# Patient Record
Sex: MALE | Race: WHITE | NOT HISPANIC OR LATINO
[De-identification: names, ages, dates, MRNs, and addresses within clinical notes are randomized per-mention and may not be internally consistent; named-entity substitution may affect disease eponyms.]

---

## 2022-11-04 ENCOUNTER — APPOINTMENT (OUTPATIENT)
Dept: CARDIOLOGY | Facility: CLINIC | Age: 87
End: 2022-11-04

## 2022-11-04 ENCOUNTER — APPOINTMENT (OUTPATIENT)
Dept: ELECTROPHYSIOLOGY | Facility: CLINIC | Age: 87
End: 2022-11-04

## 2022-11-04 ENCOUNTER — NON-APPOINTMENT (OUTPATIENT)
Age: 87
End: 2022-11-04

## 2022-11-04 VITALS
HEART RATE: 75 BPM | BODY MASS INDEX: 26.37 KG/M2 | DIASTOLIC BLOOD PRESSURE: 73 MMHG | HEIGHT: 67 IN | OXYGEN SATURATION: 99 % | WEIGHT: 168 LBS | SYSTOLIC BLOOD PRESSURE: 120 MMHG

## 2022-11-04 DIAGNOSIS — Z86.79 PERSONAL HISTORY OF OTHER DISEASES OF THE CIRCULATORY SYSTEM: ICD-10-CM

## 2022-11-04 DIAGNOSIS — Z87.891 PERSONAL HISTORY OF NICOTINE DEPENDENCE: ICD-10-CM

## 2022-11-04 DIAGNOSIS — I63.9 CEREBRAL INFARCTION, UNSPECIFIED: ICD-10-CM

## 2022-11-04 PROBLEM — Z00.00 ENCOUNTER FOR PREVENTIVE HEALTH EXAMINATION: Status: ACTIVE | Noted: 2022-11-04

## 2022-11-04 PROCEDURE — 99204 OFFICE O/P NEW MOD 45 MIN: CPT

## 2022-11-04 PROCEDURE — 93248 EXT ECG>7D<15D REV&INTERPJ: CPT

## 2022-11-09 ENCOUNTER — OUTPATIENT (OUTPATIENT)
Dept: OUTPATIENT SERVICES | Facility: HOSPITAL | Age: 87
LOS: 1 days | End: 2022-11-09
Payer: MEDICARE

## 2022-11-09 ENCOUNTER — TRANSCRIPTION ENCOUNTER (OUTPATIENT)
Age: 87
End: 2022-11-09

## 2022-11-09 ENCOUNTER — APPOINTMENT (OUTPATIENT)
Dept: CV DIAGNOSITCS | Facility: HOSPITAL | Age: 87
End: 2022-11-09

## 2022-11-09 DIAGNOSIS — I63.9 CEREBRAL INFARCTION, UNSPECIFIED: ICD-10-CM

## 2022-11-09 PROCEDURE — 93306 TTE W/DOPPLER COMPLETE: CPT | Mod: 26

## 2022-11-09 PROCEDURE — C8929: CPT

## 2022-11-10 NOTE — ASSESSMENT
[FreeTextEntry1] : Assessment:\par 1. Recent CVA\par 2. HTN\par 3. HLD\par 4. Hematuria - resolved\par 5. H/o Bladder CA and Cholangiocarcinoma\par 6. S/p bioprosthetic AVR\par \par Plan:\par 1. MRA neck showed no internal carotid artery disease.\par 2. EKG in NSR. No reported history of Afib.\par 3. Zio patch recommended for 1 week.\par 4. Recommend to follow-up with urology to see if urine cytology indicated. Urology follow-up.\par 5. Continue Plavix at this time. Continue Lipitor.\par 6. Echocardiogram was not done with bubble. Recommend Echo with bubble study.\par 7. LE venous duplex to assure no DVT.\par 8. BP controlled on BB.\par 9. Follow-up with Neurology.\par 10. Will call with test results. Call with any questions. \par Discussed with Moreno, his son in-law and grand-daughter at bedside.

## 2022-11-10 NOTE — PHYSICAL EXAM
[Respiration, Rhythm And Depth] : normal respiratory rhythm and effort [Auscultation Breath Sounds / Voice Sounds] : lungs were clear to auscultation bilaterally [Bowel Sounds] : normal bowel sounds [Abdomen Soft] : soft [Abdomen Tenderness] : non-tender [Abnormal Walk] : normal gait [Cyanosis, Localized] : no localized cyanosis [] : no ischemic changes [No Skin Ulcers] : no skin ulcer [FreeTextEntry1] : No LE edema, bilateral calves soft, bilateral calf tenderness

## 2022-11-10 NOTE — HISTORY OF PRESENT ILLNESS
[FreeTextEntry1] : 11/4/2022\par \par 89 y/o M Ex-Smoker w/ PMHX Cholangiocarcinoma (treated with CyberKnife, etc. and stable) and Bladder CA (in remission), CVA 10/20/22 (improved R upper extremity weakness), CAD (no cardiac stents), History of Aortic Valve Replacement in 2017, HTN, HLD, Hypothyroidism, COPD, CKD stage 3, was treated for CVA at [facility] with ASA, Plavix and Heparin gtt and was noted to have gross hematuria. tPA not given. In the hospital he was taken off Heparin gtt and ASA. He remains on Plavix and no further hematuria. \par \par Llabs: LDL 84 10/20, Cr. 1.5 on 10/20\par TTE 10/20 showed LVEF 54%, normal wall motion, aortic root 4.0 cm, bioprosthetic aortic valve. LA normal in size.\par CT head showed left thalamic and periventricular acute infarct secondary to small vessel ischemia.\par MTA neck showed no internal carotid artery disease.\par US kidney and bladder showed hemorrhagic products in bladder., L renal cyst.\par \par No hematuria since recent discharge. \par \par Medications:\par Synthroid 100 mcg daily\par Toprol XL 25 mg daily\par Plavix 75 mg daily\par Spiriva\par Eye Vitamin\par Super B-Complex\par Lipitor 40 mg QHS\par Finasteride\par CO!10

## 2022-11-11 ENCOUNTER — NON-APPOINTMENT (OUTPATIENT)
Age: 87
End: 2022-11-11

## 2022-11-18 ENCOUNTER — NON-APPOINTMENT (OUTPATIENT)
Age: 87
End: 2022-11-18

## 2022-11-21 ENCOUNTER — APPOINTMENT (OUTPATIENT)
Dept: ULTRASOUND IMAGING | Facility: CLINIC | Age: 87
End: 2022-11-21

## 2022-11-21 ENCOUNTER — OUTPATIENT (OUTPATIENT)
Dept: OUTPATIENT SERVICES | Facility: HOSPITAL | Age: 87
LOS: 1 days | End: 2022-11-21
Payer: MEDICARE

## 2022-11-21 DIAGNOSIS — Z00.8 ENCOUNTER FOR OTHER GENERAL EXAMINATION: ICD-10-CM

## 2022-11-21 PROCEDURE — 93970 EXTREMITY STUDY: CPT | Mod: 26

## 2022-11-21 PROCEDURE — 93970 EXTREMITY STUDY: CPT

## 2022-12-12 ENCOUNTER — NON-APPOINTMENT (OUTPATIENT)
Age: 87
End: 2022-12-12

## 2023-12-05 ENCOUNTER — APPOINTMENT (OUTPATIENT)
Dept: INFECTIOUS DISEASE | Facility: CLINIC | Age: 88
End: 2023-12-05
Payer: MEDICARE

## 2023-12-05 VITALS
HEART RATE: 80 BPM | SYSTOLIC BLOOD PRESSURE: 115 MMHG | DIASTOLIC BLOOD PRESSURE: 53 MMHG | TEMPERATURE: 97.5 F | WEIGHT: 155 LBS | BODY MASS INDEX: 24.33 KG/M2 | HEIGHT: 67 IN | OXYGEN SATURATION: 95 %

## 2023-12-05 PROCEDURE — 99204 OFFICE O/P NEW MOD 45 MIN: CPT

## 2023-12-07 ENCOUNTER — INPATIENT (INPATIENT)
Facility: HOSPITAL | Age: 88
LOS: 10 days | Discharge: HOSPICE | DRG: 559 | End: 2023-12-18
Attending: INTERNAL MEDICINE | Admitting: INTERNAL MEDICINE
Payer: MEDICARE

## 2023-12-07 VITALS
HEIGHT: 67 IN | SYSTOLIC BLOOD PRESSURE: 134 MMHG | HEART RATE: 85 BPM | DIASTOLIC BLOOD PRESSURE: 74 MMHG | TEMPERATURE: 98 F | RESPIRATION RATE: 20 BRPM | WEIGHT: 154.98 LBS

## 2023-12-07 DIAGNOSIS — Z96.641 PRESENCE OF RIGHT ARTIFICIAL HIP JOINT: Chronic | ICD-10-CM

## 2023-12-07 DIAGNOSIS — M25.551 PAIN IN RIGHT HIP: ICD-10-CM

## 2023-12-07 LAB
ALBUMIN SERPL ELPH-MCNC: 3.2 G/DL — LOW (ref 3.3–5)
ALBUMIN SERPL ELPH-MCNC: 3.2 G/DL — LOW (ref 3.3–5)
ALP SERPL-CCNC: 249 U/L — HIGH (ref 40–120)
ALP SERPL-CCNC: 249 U/L — HIGH (ref 40–120)
ALT FLD-CCNC: 52 U/L — HIGH (ref 10–45)
ALT FLD-CCNC: 52 U/L — HIGH (ref 10–45)
ANION GAP SERPL CALC-SCNC: 15 MMOL/L — SIGNIFICANT CHANGE UP (ref 5–17)
ANION GAP SERPL CALC-SCNC: 15 MMOL/L — SIGNIFICANT CHANGE UP (ref 5–17)
APTT BLD: 28.7 SEC — SIGNIFICANT CHANGE UP (ref 24.5–35.6)
APTT BLD: 28.7 SEC — SIGNIFICANT CHANGE UP (ref 24.5–35.6)
AST SERPL-CCNC: 54 U/L — HIGH (ref 10–40)
AST SERPL-CCNC: 54 U/L — HIGH (ref 10–40)
BASE EXCESS BLDV CALC-SCNC: -0.6 MMOL/L — SIGNIFICANT CHANGE UP (ref -2–3)
BASE EXCESS BLDV CALC-SCNC: -0.6 MMOL/L — SIGNIFICANT CHANGE UP (ref -2–3)
BASOPHILS # BLD AUTO: 0.03 K/UL — SIGNIFICANT CHANGE UP (ref 0–0.2)
BASOPHILS # BLD AUTO: 0.03 K/UL — SIGNIFICANT CHANGE UP (ref 0–0.2)
BASOPHILS NFR BLD AUTO: 0.3 % — SIGNIFICANT CHANGE UP (ref 0–2)
BASOPHILS NFR BLD AUTO: 0.3 % — SIGNIFICANT CHANGE UP (ref 0–2)
BILIRUB SERPL-MCNC: 0.6 MG/DL — SIGNIFICANT CHANGE UP (ref 0.2–1.2)
BILIRUB SERPL-MCNC: 0.6 MG/DL — SIGNIFICANT CHANGE UP (ref 0.2–1.2)
BUN SERPL-MCNC: 44 MG/DL — HIGH (ref 7–23)
BUN SERPL-MCNC: 44 MG/DL — HIGH (ref 7–23)
CA-I SERPL-SCNC: 1.26 MMOL/L — SIGNIFICANT CHANGE UP (ref 1.15–1.33)
CA-I SERPL-SCNC: 1.26 MMOL/L — SIGNIFICANT CHANGE UP (ref 1.15–1.33)
CALCIUM SERPL-MCNC: 9.3 MG/DL — SIGNIFICANT CHANGE UP (ref 8.4–10.5)
CALCIUM SERPL-MCNC: 9.3 MG/DL — SIGNIFICANT CHANGE UP (ref 8.4–10.5)
CHLORIDE BLDV-SCNC: 109 MMOL/L — HIGH (ref 96–108)
CHLORIDE BLDV-SCNC: 109 MMOL/L — HIGH (ref 96–108)
CHLORIDE SERPL-SCNC: 105 MMOL/L — SIGNIFICANT CHANGE UP (ref 96–108)
CHLORIDE SERPL-SCNC: 105 MMOL/L — SIGNIFICANT CHANGE UP (ref 96–108)
CO2 BLDV-SCNC: 26 MMOL/L — SIGNIFICANT CHANGE UP (ref 22–26)
CO2 BLDV-SCNC: 26 MMOL/L — SIGNIFICANT CHANGE UP (ref 22–26)
CO2 SERPL-SCNC: 20 MMOL/L — LOW (ref 22–31)
CO2 SERPL-SCNC: 20 MMOL/L — LOW (ref 22–31)
CREAT SERPL-MCNC: 1.14 MG/DL — SIGNIFICANT CHANGE UP (ref 0.5–1.3)
CREAT SERPL-MCNC: 1.14 MG/DL — SIGNIFICANT CHANGE UP (ref 0.5–1.3)
CRP SERPL-MCNC: 58 MG/L — HIGH (ref 0–4)
CRP SERPL-MCNC: 58 MG/L — HIGH (ref 0–4)
EGFR: 59 ML/MIN/1.73M2 — LOW
EGFR: 59 ML/MIN/1.73M2 — LOW
EOSINOPHIL # BLD AUTO: 0.08 K/UL — SIGNIFICANT CHANGE UP (ref 0–0.5)
EOSINOPHIL # BLD AUTO: 0.08 K/UL — SIGNIFICANT CHANGE UP (ref 0–0.5)
EOSINOPHIL NFR BLD AUTO: 0.8 % — SIGNIFICANT CHANGE UP (ref 0–6)
EOSINOPHIL NFR BLD AUTO: 0.8 % — SIGNIFICANT CHANGE UP (ref 0–6)
ERYTHROCYTE [SEDIMENTATION RATE] IN BLOOD: 94 MM/HR — HIGH (ref 0–20)
ERYTHROCYTE [SEDIMENTATION RATE] IN BLOOD: 94 MM/HR — HIGH (ref 0–20)
GAS PNL BLDV: 137 MMOL/L — SIGNIFICANT CHANGE UP (ref 136–145)
GAS PNL BLDV: 137 MMOL/L — SIGNIFICANT CHANGE UP (ref 136–145)
GAS PNL BLDV: SIGNIFICANT CHANGE UP
GAS PNL BLDV: SIGNIFICANT CHANGE UP
GLUCOSE BLDV-MCNC: 109 MG/DL — HIGH (ref 70–99)
GLUCOSE BLDV-MCNC: 109 MG/DL — HIGH (ref 70–99)
GLUCOSE SERPL-MCNC: 113 MG/DL — HIGH (ref 70–99)
GLUCOSE SERPL-MCNC: 113 MG/DL — HIGH (ref 70–99)
HCO3 BLDV-SCNC: 25 MMOL/L — SIGNIFICANT CHANGE UP (ref 22–29)
HCO3 BLDV-SCNC: 25 MMOL/L — SIGNIFICANT CHANGE UP (ref 22–29)
HCT VFR BLD CALC: 25.6 % — LOW (ref 39–50)
HCT VFR BLD CALC: 25.6 % — LOW (ref 39–50)
HCT VFR BLDA CALC: 26 % — LOW (ref 39–51)
HCT VFR BLDA CALC: 26 % — LOW (ref 39–51)
HGB BLD CALC-MCNC: 8.5 G/DL — LOW (ref 12.6–17.4)
HGB BLD CALC-MCNC: 8.5 G/DL — LOW (ref 12.6–17.4)
HGB BLD-MCNC: 8 G/DL — LOW (ref 13–17)
HGB BLD-MCNC: 8 G/DL — LOW (ref 13–17)
IMM GRANULOCYTES NFR BLD AUTO: 0.6 % — SIGNIFICANT CHANGE UP (ref 0–0.9)
IMM GRANULOCYTES NFR BLD AUTO: 0.6 % — SIGNIFICANT CHANGE UP (ref 0–0.9)
INR BLD: 1.91 RATIO — HIGH (ref 0.85–1.18)
INR BLD: 1.91 RATIO — HIGH (ref 0.85–1.18)
LACTATE BLDV-MCNC: 1.7 MMOL/L — SIGNIFICANT CHANGE UP (ref 0.5–2)
LACTATE BLDV-MCNC: 1.7 MMOL/L — SIGNIFICANT CHANGE UP (ref 0.5–2)
LYMPHOCYTES # BLD AUTO: 0.7 K/UL — LOW (ref 1–3.3)
LYMPHOCYTES # BLD AUTO: 0.7 K/UL — LOW (ref 1–3.3)
LYMPHOCYTES # BLD AUTO: 7 % — LOW (ref 13–44)
LYMPHOCYTES # BLD AUTO: 7 % — LOW (ref 13–44)
MAGNESIUM SERPL-MCNC: 2.4 MG/DL — SIGNIFICANT CHANGE UP (ref 1.6–2.6)
MAGNESIUM SERPL-MCNC: 2.4 MG/DL — SIGNIFICANT CHANGE UP (ref 1.6–2.6)
MCHC RBC-ENTMCNC: 31.3 GM/DL — LOW (ref 32–36)
MCHC RBC-ENTMCNC: 31.3 GM/DL — LOW (ref 32–36)
MCHC RBC-ENTMCNC: 31.4 PG — SIGNIFICANT CHANGE UP (ref 27–34)
MCHC RBC-ENTMCNC: 31.4 PG — SIGNIFICANT CHANGE UP (ref 27–34)
MCV RBC AUTO: 100.4 FL — HIGH (ref 80–100)
MCV RBC AUTO: 100.4 FL — HIGH (ref 80–100)
MONOCYTES # BLD AUTO: 0.96 K/UL — HIGH (ref 0–0.9)
MONOCYTES # BLD AUTO: 0.96 K/UL — HIGH (ref 0–0.9)
MONOCYTES NFR BLD AUTO: 9.6 % — SIGNIFICANT CHANGE UP (ref 2–14)
MONOCYTES NFR BLD AUTO: 9.6 % — SIGNIFICANT CHANGE UP (ref 2–14)
NEUTROPHILS # BLD AUTO: 8.21 K/UL — HIGH (ref 1.8–7.4)
NEUTROPHILS # BLD AUTO: 8.21 K/UL — HIGH (ref 1.8–7.4)
NEUTROPHILS NFR BLD AUTO: 81.7 % — HIGH (ref 43–77)
NEUTROPHILS NFR BLD AUTO: 81.7 % — HIGH (ref 43–77)
NRBC # BLD: 0 /100 WBCS — SIGNIFICANT CHANGE UP (ref 0–0)
NRBC # BLD: 0 /100 WBCS — SIGNIFICANT CHANGE UP (ref 0–0)
PCO2 BLDV: 44 MMHG — SIGNIFICANT CHANGE UP (ref 42–55)
PCO2 BLDV: 44 MMHG — SIGNIFICANT CHANGE UP (ref 42–55)
PH BLDV: 7.36 — SIGNIFICANT CHANGE UP (ref 7.32–7.43)
PH BLDV: 7.36 — SIGNIFICANT CHANGE UP (ref 7.32–7.43)
PHOSPHATE SERPL-MCNC: 4.2 MG/DL — SIGNIFICANT CHANGE UP (ref 2.5–4.5)
PHOSPHATE SERPL-MCNC: 4.2 MG/DL — SIGNIFICANT CHANGE UP (ref 2.5–4.5)
PLATELET # BLD AUTO: 203 K/UL — SIGNIFICANT CHANGE UP (ref 150–400)
PLATELET # BLD AUTO: 203 K/UL — SIGNIFICANT CHANGE UP (ref 150–400)
PO2 BLDV: 32 MMHG — SIGNIFICANT CHANGE UP (ref 25–45)
PO2 BLDV: 32 MMHG — SIGNIFICANT CHANGE UP (ref 25–45)
POTASSIUM BLDV-SCNC: 4.2 MMOL/L — SIGNIFICANT CHANGE UP (ref 3.5–5.1)
POTASSIUM BLDV-SCNC: 4.2 MMOL/L — SIGNIFICANT CHANGE UP (ref 3.5–5.1)
POTASSIUM SERPL-MCNC: 4.7 MMOL/L — SIGNIFICANT CHANGE UP (ref 3.5–5.3)
POTASSIUM SERPL-MCNC: 4.7 MMOL/L — SIGNIFICANT CHANGE UP (ref 3.5–5.3)
POTASSIUM SERPL-SCNC: 4.7 MMOL/L — SIGNIFICANT CHANGE UP (ref 3.5–5.3)
POTASSIUM SERPL-SCNC: 4.7 MMOL/L — SIGNIFICANT CHANGE UP (ref 3.5–5.3)
PROCALCITONIN SERPL-MCNC: 0.15 NG/ML — HIGH (ref 0.02–0.1)
PROCALCITONIN SERPL-MCNC: 0.15 NG/ML — HIGH (ref 0.02–0.1)
PROT SERPL-MCNC: 7.3 G/DL — SIGNIFICANT CHANGE UP (ref 6–8.3)
PROT SERPL-MCNC: 7.3 G/DL — SIGNIFICANT CHANGE UP (ref 6–8.3)
PROTHROM AB SERPL-ACNC: 20.6 SEC — HIGH (ref 9.5–13)
PROTHROM AB SERPL-ACNC: 20.6 SEC — HIGH (ref 9.5–13)
RBC # BLD: 2.55 M/UL — LOW (ref 4.2–5.8)
RBC # BLD: 2.55 M/UL — LOW (ref 4.2–5.8)
RBC # FLD: 16 % — HIGH (ref 10.3–14.5)
RBC # FLD: 16 % — HIGH (ref 10.3–14.5)
SAO2 % BLDV: 33.5 % — LOW (ref 67–88)
SAO2 % BLDV: 33.5 % — LOW (ref 67–88)
SODIUM SERPL-SCNC: 140 MMOL/L — SIGNIFICANT CHANGE UP (ref 135–145)
SODIUM SERPL-SCNC: 140 MMOL/L — SIGNIFICANT CHANGE UP (ref 135–145)
WBC # BLD: 10.04 K/UL — SIGNIFICANT CHANGE UP (ref 3.8–10.5)
WBC # BLD: 10.04 K/UL — SIGNIFICANT CHANGE UP (ref 3.8–10.5)
WBC # FLD AUTO: 10.04 K/UL — SIGNIFICANT CHANGE UP (ref 3.8–10.5)
WBC # FLD AUTO: 10.04 K/UL — SIGNIFICANT CHANGE UP (ref 3.8–10.5)

## 2023-12-07 PROCEDURE — 99232 SBSQ HOSP IP/OBS MODERATE 35: CPT

## 2023-12-07 PROCEDURE — 72170 X-RAY EXAM OF PELVIS: CPT | Mod: 26,59

## 2023-12-07 PROCEDURE — 73552 X-RAY EXAM OF FEMUR 2/>: CPT | Mod: 26,RT

## 2023-12-07 PROCEDURE — 73502 X-RAY EXAM HIP UNI 2-3 VIEWS: CPT | Mod: 26,RT

## 2023-12-07 PROCEDURE — 99285 EMERGENCY DEPT VISIT HI MDM: CPT

## 2023-12-07 PROCEDURE — 99223 1ST HOSP IP/OBS HIGH 75: CPT | Mod: GC

## 2023-12-07 RX ORDER — LEVOTHYROXINE SODIUM 125 MCG
1 TABLET ORAL
Refills: 0 | DISCHARGE

## 2023-12-07 RX ORDER — TAMSULOSIN HYDROCHLORIDE 0.4 MG/1
1 CAPSULE ORAL
Refills: 0 | DISCHARGE

## 2023-12-07 RX ORDER — SENNA PLUS 8.6 MG/1
0 TABLET ORAL
Refills: 0 | DISCHARGE

## 2023-12-07 RX ORDER — ATORVASTATIN CALCIUM 80 MG/1
1 TABLET, FILM COATED ORAL
Refills: 0 | DISCHARGE

## 2023-12-07 RX ORDER — DAPTOMYCIN 500 MG/10ML
400 INJECTION, POWDER, LYOPHILIZED, FOR SOLUTION INTRAVENOUS EVERY 24 HOURS
Refills: 0 | Status: DISCONTINUED | OUTPATIENT
Start: 2023-12-07 | End: 2023-12-11

## 2023-12-07 RX ORDER — TAMSULOSIN HYDROCHLORIDE 0.4 MG/1
0.4 CAPSULE ORAL AT BEDTIME
Refills: 0 | Status: DISCONTINUED | OUTPATIENT
Start: 2023-12-07 | End: 2023-12-18

## 2023-12-07 RX ORDER — DAPTOMYCIN 500 MG/10ML
400 INJECTION, POWDER, LYOPHILIZED, FOR SOLUTION INTRAVENOUS ONCE
Refills: 0 | Status: COMPLETED | OUTPATIENT
Start: 2023-12-07 | End: 2023-12-07

## 2023-12-07 RX ORDER — LEVOTHYROXINE SODIUM 125 MCG
75 TABLET ORAL DAILY
Refills: 0 | Status: DISCONTINUED | OUTPATIENT
Start: 2023-12-07 | End: 2023-12-18

## 2023-12-07 RX ORDER — TIOTROPIUM BROMIDE 18 UG/1
2 CAPSULE ORAL; RESPIRATORY (INHALATION) DAILY
Refills: 0 | Status: DISCONTINUED | OUTPATIENT
Start: 2023-12-07 | End: 2023-12-18

## 2023-12-07 RX ORDER — ATORVASTATIN CALCIUM 80 MG/1
40 TABLET, FILM COATED ORAL AT BEDTIME
Refills: 0 | Status: DISCONTINUED | OUTPATIENT
Start: 2023-12-07 | End: 2023-12-18

## 2023-12-07 RX ORDER — FUROSEMIDE 40 MG
20 TABLET ORAL DAILY
Refills: 0 | Status: DISCONTINUED | OUTPATIENT
Start: 2023-12-07 | End: 2023-12-09

## 2023-12-07 RX ORDER — FOLIC ACID 0.8 MG
1 TABLET ORAL
Refills: 0 | DISCHARGE

## 2023-12-07 RX ORDER — METOPROLOL TARTRATE 50 MG
100 TABLET ORAL
Refills: 0 | Status: DISCONTINUED | OUTPATIENT
Start: 2023-12-07 | End: 2023-12-18

## 2023-12-07 RX ORDER — HYDROMORPHONE HYDROCHLORIDE 2 MG/ML
0.5 INJECTION INTRAMUSCULAR; INTRAVENOUS; SUBCUTANEOUS ONCE
Refills: 0 | Status: DISCONTINUED | OUTPATIENT
Start: 2023-12-07 | End: 2023-12-07

## 2023-12-07 RX ORDER — CLOPIDOGREL BISULFATE 75 MG/1
75 TABLET, FILM COATED ORAL DAILY
Refills: 0 | Status: DISCONTINUED | OUTPATIENT
Start: 2023-12-07 | End: 2023-12-11

## 2023-12-07 RX ORDER — FINASTERIDE 5 MG/1
5 TABLET, FILM COATED ORAL DAILY
Refills: 0 | Status: DISCONTINUED | OUTPATIENT
Start: 2023-12-07 | End: 2023-12-18

## 2023-12-07 RX ORDER — POTASSIUM CHLORIDE 20 MEQ
10 PACKET (EA) ORAL DAILY
Refills: 0 | Status: DISCONTINUED | OUTPATIENT
Start: 2023-12-07 | End: 2023-12-18

## 2023-12-07 RX ORDER — APIXABAN 2.5 MG/1
5 TABLET, FILM COATED ORAL
Refills: 0 | Status: DISCONTINUED | OUTPATIENT
Start: 2023-12-07 | End: 2023-12-11

## 2023-12-07 RX ORDER — SENNA PLUS 8.6 MG/1
2 TABLET ORAL AT BEDTIME
Refills: 0 | Status: DISCONTINUED | OUTPATIENT
Start: 2023-12-07 | End: 2023-12-18

## 2023-12-07 RX ADMIN — TAMSULOSIN HYDROCHLORIDE 0.4 MILLIGRAM(S): 0.4 CAPSULE ORAL at 21:11

## 2023-12-07 RX ADMIN — DAPTOMYCIN 116 MILLIGRAM(S): 500 INJECTION, POWDER, LYOPHILIZED, FOR SOLUTION INTRAVENOUS at 14:05

## 2023-12-07 RX ADMIN — HYDROMORPHONE HYDROCHLORIDE 0.5 MILLIGRAM(S): 2 INJECTION INTRAMUSCULAR; INTRAVENOUS; SUBCUTANEOUS at 12:48

## 2023-12-07 RX ADMIN — SENNA PLUS 2 TABLET(S): 8.6 TABLET ORAL at 21:12

## 2023-12-07 RX ADMIN — HYDROMORPHONE HYDROCHLORIDE 0.5 MILLIGRAM(S): 2 INJECTION INTRAMUSCULAR; INTRAVENOUS; SUBCUTANEOUS at 11:28

## 2023-12-07 RX ADMIN — ATORVASTATIN CALCIUM 40 MILLIGRAM(S): 80 TABLET, FILM COATED ORAL at 21:12

## 2023-12-07 NOTE — ED PROVIDER NOTE - CLINICAL SUMMARY MEDICAL DECISION MAKING FREE TEXT BOX
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 VSS. Exam with significant tenderness on R hip with limited ROM. Will obtain full set of labs including ESR, CRP, blood cultures. Initial imaging with XR. Will consult ID and orthopedics.

## 2023-12-07 NOTE — ED ADULT NURSE NOTE - OBJECTIVE STATEMENT
89Y M AXO3 PMH of bladder cancer, HTN, COPD, hypothyroid, and afib, aflutter and PVC and PSH of CABG presented to the ED via EMS from home c/o R hip pain. EMS states pt was "treated for infection of R hip at [facility] for 4 weeks and was d/c 2 days ago." Pt's family at bedside and states infection "originated in bladder and spread to R hip joint." EMS states pt uses intermittent oxygen at home 2L. Pt states since "d/c from [place] it has been harder to ambulate." Pt denies trauma to site.  Upon arrival to the ED, the pt is well appearing, has bilateral even and unlabored chest rise, and ambulatory with walker. Upon assessment, pt has even and bilateral peripheral pulses, ROM limited to R hip, gross neuro intact, and soft, non-tender, non-distended abdomen. Tenderness noted upon palpation of R hip. Pt denies fevers, chills, chest pain, SOB, n/v/d, headaches, dizziness, urinary symptoms, and black or bloody stools. IV access obtained, bed in lowest position, side rails up, and call bell within reach. Comfort and safety provided. 89Y M AXO3 PMH of bladder cancer, HTN, COPD, hypothyroid, and afib, aflutter and PVC and PSH of CABG presented to the ED via EMS from home c/o R hip pain. EMS states pt was "treated for infection of R hip at [facility] for 4 weeks and was d/c 2 days ago." Pt's family at bedside and states infection "originated in bladder and spread to R hip joint." EMS states pt uses intermittent oxygen at home 2L. Pt states since "d/c from [place] it has been harder to ambulate." Pt denies trauma to site.  Upon arrival to the ED, the pt is well appearing, has bilateral even and unlabored chest rise, and ambulatory with walker. Upon assessment, pt has even and bilateral peripheral pulses, ROM limited to R hip, gross neuro intact, and soft, non-tender, non-distended abdomen. Tenderness noted upon palpation of R hip. Pt denies fevers, chills, chest pain, SOB, n/v/d, headaches, dizziness, urinary symptoms, and black or bloody stools. IV access obtained, bed in lowest position, side rails up, and call bell within reach. Comfort and safety provided.

## 2023-12-07 NOTE — ED PROVIDER NOTE - OBJECTIVE STATEMENT
89M with history of HTN, HLD, COPD (on intermittent 2L NC), CAD (no stents), s/p bioprosthetic AVR, CVA in 10/2022, s/p R total hip replacement ~30 years ago, afib/aflutter, hypothyroid, CKD, bladder cancer, and cholangiocarcinoma (s/p treatment, stable) presenting from home with worsening R hip pain. Accompanied by granddaughter at bedside. Patient was found to have UTI in 9/2023 and was treated for 1 month. In 10/2023, patient was found to be bacteremic with septic joint on R hip, was treated with 6 week course of IV antibiotics without surgical debridement or operation given age and other comorbidities. Patient was discharged from [place] to [facility], was able to work with PT appropriately. He finished last dose of IV antibiotics and was discharged home on Tuesday. He was seen by Dr. Redding in clinic yesterday and was being planned for IV antibiotics but has not started yet. He started having significant pain on the R hip again since discharge, now unable to bear weight. No fever or chills. No falls or trauma. 89M with history of HTN, HLD, COPD (on intermittent 2L NC), CAD (no stents), s/p bioprosthetic AVR, CVA in 10/2022, s/p R total hip replacement ~30 years ago, afib/aflutter, hypothyroid, CKD, bladder cancer, and cholangiocarcinoma (s/p treatment, stable) presenting from home with worsening R hip pain. Accompanied by granddaughter at bedside. Patient was found to have UTI in 9/2023 and was treated for 1 month. In 10/2023, patient was found to be bacteremic with septic joint on R hip, was treated with 6 week course of IV antibiotics without surgical debridement or operation given age and other comorbidities. Patient was discharged from [employer] to [facility], was able to work with PT appropriately. He finished last dose of IV antibiotics and was discharged home on Tuesday. He was seen by Dr. Redding in clinic yesterday and was being planned for IV antibiotics but has not started yet. He started having significant pain on the R hip again since discharge, now unable to bear weight. No fever or chills. No falls or trauma. 89M with history of HTN, HLD, COPD (on intermittent 2L NC), CAD (no stents), s/p bioprosthetic AVR, CVA in 10/2022, s/p R total hip replacement ~30 years ago, afib/aflutter, hypothyroid, CKD, bladder cancer, and cholangiocarcinoma (s/p treatment, stable) presenting from home with worsening R hip pain. Accompanied by granddaughter, in the medical field, at bedside. Patient was found to have UTI in 9/2023 and was treated for 1 month. In 10/2023, patient was found to be bacteremic with septic joint on R hip, was treated with 6 week course of IV antibiotics without surgical debridement or operation given age and other comorbidities. Patient was discharged from [place] to [facility], was able to work with PT appropriately. He finished last dose of IV antibiotics and was discharged home on Tuesday. He was seen by Dr. Redding in clinic yesterday and was being planned for IV antibiotics but has not started yet. He started having significant pain on the R hip again since discharge, now unable to bear weight. No fever or chills. No falls or trauma. 89M with history of HTN, HLD, COPD (on intermittent 2L NC), CAD (no stents), s/p bioprosthetic AVR, CVA in 10/2022, s/p R total hip replacement ~30 years ago, afib/aflutter, hypothyroid, CKD, bladder cancer, and cholangiocarcinoma (s/p treatment, stable) presenting from home with worsening R hip pain. Accompanied by granddaughter, in the medical field, at bedside. Patient was found to have UTI in 9/2023 and was treated for 1 month. In 10/2023, patient was found to be bacteremic with septic joint on R hip, was treated with 6 week course of IV antibiotics without surgical debridement or operation given age and other comorbidities. Patient was discharged from [place] to [facility], was able to work with PT appropriately. He finished last dose of IV antibiotics and was discharged home on Tuesday. He was seen by Dr. Redding in clinic yesterday and was being planned for IV antibiotics but has not started yet. He started having significant pain on the R hip again since discharge, now unable to bear weight. No fever or chills. No falls or trauma.

## 2023-12-07 NOTE — ED PROVIDER NOTE - PHYSICAL EXAMINATION
GENERAL: NAD, well-groomed, on NC  HEAD: atraumatic, normocephalic  EYES: sclera clear  ENMT:   NECK: supple  LUNG: clear to auscultation bilaterally, no wheezes  HEART: +s1/s2, regular rate and rhythm, no murmurs  ABDOMEN: soft, non-tender, non-distended, no rigidity  MSK: +log roll test, limited ROM in R hip, tender with movement on R  EXTREMITIES: +2 pitting edema bilaterally  SKIN: erythematous serpiginous papule/patch on bilateral anterior shin   NEURO: alert and answering questions, moving extremities GENERAL: NAD, well-groomed, on NC  HEAD: atraumatic, normocephalic  EYES: sclera clear  NECK: supple  LUNG: clear to auscultation bilaterally, no wheezes  HEART: +s1/s2, regular rate and rhythm, no murmurs  ABDOMEN: soft, non-tender, non-distended, no rigidity  MSK: +log roll test, limited ROM in R hip, tender with movement on R  EXTREMITIES: +2 pitting edema bilaterally  SKIN: erythematous serpiginous papule/patch on bilateral anterior shin   NEURO: alert and answering questions, moving extremities

## 2023-12-07 NOTE — ED ADULT NURSE NOTE - NSFALLHARMRISKINTERV_ED_ALL_ED
Assistance OOB with selected safe patient handling equipment if applicable/Assistance with ambulation/Communicate risk of Fall with Harm to all staff, patient, and family/Monitor gait and stability/Provide patient with walking aids/Provide visual cue: red socks, yellow wristband, yellow gown, etc/Reinforce activity limits and safety measures with patient and family/Bed in lowest position, wheels locked, appropriate side rails in place/Call bell, personal items and telephone in reach/Instruct patient to call for assistance before getting out of bed/chair/stretcher/Non-slip footwear applied when patient is off stretcher/[place] to call system/Physically safe environment - no spills, clutter or unnecessary equipment/Purposeful Proactive Rounding/Room/bathroom lighting operational, light cord in reach Assistance OOB with selected safe patient handling equipment if applicable/Assistance with ambulation/Communicate risk of Fall with Harm to all staff, patient, and family/Monitor gait and stability/Provide patient with walking aids/Provide visual cue: red socks, yellow wristband, yellow gown, etc/Reinforce activity limits and safety measures with patient and family/Bed in lowest position, wheels locked, appropriate side rails in place/Call bell, personal items and telephone in reach/Instruct patient to call for assistance before getting out of bed/chair/stretcher/Non-slip footwear applied when patient is off stretcher/[place] to call system/Physically safe environment - no spills, clutter or unnecessary equipment/Purposeful Proactive Rounding/Room/bathroom lighting operational, light cord in reach

## 2023-12-07 NOTE — H&P ADULT - NEGATIVE GENERAL SYMPTOMS
Patient arrives to dept with complaints of right hand middle finger injury. He hurt it today on dog's leash/collar.    no fever/no chills/no sweating/no anorexia/no weight loss

## 2023-12-07 NOTE — ED ADULT NURSE NOTE - ED STAT RN HANDOFF DETAILS
Report given to SHERON Jamison. Report given to [facility] pt awaiting transport. Report given to SHERON Jamison. Report given to [facility] pt awaiting transport.

## 2023-12-07 NOTE — CONSULT NOTE ADULT - ASSESSMENT
89 year old with multiple medical problems including    AVR  CVA  CAD  Bladder cancer  Cholangiocarcinoma  Rp hip replacement many yrs ago    pt had a rt THR infection about 10 years ago that was treated with debridement and IV ab and improved     Recently presented to  with pain and was found to have BC and Hip aspirate culture positive S. 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 Vickers, ( ortho)  as family is now willing to consider surgery to improve his quality of life        89 year old with multiple medical problems including    AVR  CVA  CAD  Bladder cancer  Cholangiocarcinoma  Rp hip replacement many yrs ago    pt had a rt THR infection about 10 years ago that was treated with debridement and IV ab and improved     Recently presented to  with pain and was found to have BC and Hip aspirate culture positive S. 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 Vickers, ( ortho)  as family is now willing to consider surgery to improve his quality of life

## 2023-12-07 NOTE — CONSULT NOTE ADULT - SUBJECTIVE AND OBJECTIVE BOX
Patient is a 89y old  Male who presents with a chief complaint of   HPI:      PAST MEDICAL & SURGICAL HISTORY:  Atrial fibrillation      Atrial flutter      CAD (coronary artery disease)      S/P aortic valve replacement with bioprosthetic valve      History of COPD      CVA (cerebrovascular accident)      Hypothyroid      Bladder cancer      Cholangiocarcinoma      S/P hip replacement, right          Social history:    FAMILY HISTORY:    REVIEW OF SYSTEMS       Allergic/Immunologic:	No hives or rash   Allergies    penicillins (Other)    Intolerances        Antimicrobials:          Vital Signs Last 24 Hrs  T(C): 36.8 (07 Dec 2023 11:00), Max: 36.8 (07 Dec 2023 11:00)  T(F): 98.3 (07 Dec 2023 11:00), Max: 98.3 (07 Dec 2023 11:00)  HR: 84 (07 Dec 2023 11:00) (84 - 85)  BP: 113/62 (07 Dec 2023 11:00) (113/62 - 134/74)  BP(mean): 78 (07 Dec 2023 11:00) (78 - 78)  RR: 19 (07 Dec 2023 11:00) (19 - 20)  SpO2: 97% (07 Dec 2023 11:00) (97% - 97%)    Parameters below as of 07 Dec 2023 11:00  Patient On (Oxygen Delivery Method): nasal cannula  O2 Flow (L/min): 2      PHYSICAL EXAM:Pleasant patient in no acute distress.       Eyes:PERRL EOMI.NO discharge or conjunctival injection    ENMT:No sinus tenderness.No thrush.No pharyngeal exudate or erythema.Fair dental hygiene    Neck:Supple,No LN,no JVD      Respiratory:Good air entry bilaterally,CTA    Cardiovascular:S1 S2 wnl, No murmurs,rub or gallops    Gastrointestinal:Soft BS(+) no tenderness no masses ,No rebound or guarding    Genitourinary:No CVA tendereness     Rectal:    Extremities:No cyanosis,clubbing or edema.     Psychiatric:Affect normal.                                8.0    10.04 )-----------( 203      ( 07 Dec 2023 11:10 )             25.6                 RECENT CULTURES:      MICROBIOLOGY:          Radiology:      Assessment:        Recommendations and Plan:    Pager [phone]  After 5 pm/weekends or if no response :[phone]       Patient is a 89y old  Male who presents with a chief complaint of   HPI:      PAST MEDICAL & SURGICAL HISTORY:  Atrial fibrillation      Atrial flutter      CAD (coronary artery disease)      S/P aortic valve replacement with bioprosthetic valve      History of COPD      CVA (cerebrovascular accident)      Hypothyroid      Bladder cancer      Cholangiocarcinoma      S/P hip replacement, right          Social history:    FAMILY HISTORY:    REVIEW OF SYSTEMS       Allergic/Immunologic:	No hives or rash   Allergies    penicillins (Other)    Intolerances        Antimicrobials:          Vital Signs Last 24 Hrs  T(C): 36.8 (07 Dec 2023 11:00), Max: 36.8 (07 Dec 2023 11:00)  T(F): 98.3 (07 Dec 2023 11:00), Max: 98.3 (07 Dec 2023 11:00)  HR: 84 (07 Dec 2023 11:00) (84 - 85)  BP: 113/62 (07 Dec 2023 11:00) (113/62 - 134/74)  BP(mean): 78 (07 Dec 2023 11:00) (78 - 78)  RR: 19 (07 Dec 2023 11:00) (19 - 20)  SpO2: 97% (07 Dec 2023 11:00) (97% - 97%)    Parameters below as of 07 Dec 2023 11:00  Patient On (Oxygen Delivery Method): nasal cannula  O2 Flow (L/min): 2      PHYSICAL EXAM:Pleasant patient in no acute distress.       Eyes:PERRL EOMI.NO discharge or conjunctival injection    ENMT:No sinus tenderness.No thrush.No pharyngeal exudate or erythema.Fair dental hygiene    Neck:Supple,No LN,no JVD      Respiratory:Good air entry bilaterally,CTA    Cardiovascular:S1 S2 wnl, No murmurs,rub or gallops    Gastrointestinal:Soft BS(+) no tenderness no masses ,No rebound or guarding    Genitourinary:No CVA tendereness     Rectal:    Extremities:No cyanosis,clubbing or edema.     Psychiatric:Affect normal.                                8.0    10.04 )-----------( 203      ( 07 Dec 2023 11:10 )             25.6                 RECENT CULTURES:      MICROBIOLOGY:          Radiology:      Assessment:        Recommendations and Plan:    Pager [phone]  After 5 pm/weekends or if no response :[phone]

## 2023-12-07 NOTE — PATIENT PROFILE ADULT - FALL HARM RISK - HARM RISK INTERVENTIONS
Assistance with ambulation/Assistance OOB with selected safe patient handling equipment/Communicate Risk of Fall with Harm to all staff/Discuss with provider need for PT consult/Monitor gait and stability/Provide patient with walking aids - walker, cane, crutches/Reinforce activity limits and safety measures with patient and family/Tailored Fall Risk Interventions/Visual Cue: Yellow wristband and red socks/Bed in lowest position, wheels locked, appropriate side rails in place/Call bell, personal items and telephone in reach/Instruct patient to call for assistance before getting out of bed or chair/Non-slip footwear when patient is out of bed/[place] to call system/Physically safe environment - no spills, clutter or unnecessary equipment/Purposeful Proactive Rounding/Room/bathroom lighting operational, light cord in reach Assistance with ambulation/Assistance OOB with selected safe patient handling equipment/Communicate Risk of Fall with Harm to all staff/Discuss with provider need for PT consult/Monitor gait and stability/Provide patient with walking aids - walker, cane, crutches/Reinforce activity limits and safety measures with patient and family/Tailored Fall Risk Interventions/Visual Cue: Yellow wristband and red socks/Bed in lowest position, wheels locked, appropriate side rails in place/Call bell, personal items and telephone in reach/Instruct patient to call for assistance before getting out of bed or chair/Non-slip footwear when patient is out of bed/[place] to call system/Physically safe environment - no spills, clutter or unnecessary equipment/Purposeful Proactive Rounding/Room/bathroom lighting operational, light cord in reach

## 2023-12-07 NOTE — ED PROVIDER NOTE - ATTENDING CONTRIBUTION TO CARE
Moose Rodriguez MD, [facility]  Patient endorsed to Dr. Izquierdo at the time of admission. Based on patient's history and physical exam, as well as the results of today's workup, I feel that patient warrants admission to the hospital for further workup/evaluation and continued management. I discussed the findings of today's workup with the patient and addressed the patient's questions and concerns. The patient was agreeable with admission. Our team spoke with the inpatient receiving team who accepted the patient for admission and subsequently took over the patient's care at the time of admission. The receiving team will follow up on pending labs, analgesia, any clinical imaging results, ancillary findings, reassess, and disposition as clinically indicated. Details of patient and plan conveyed to receiving physician team and conveyed back for understanding. There were no questions at this time about the patient's status, disposition, and plan. Patient's care to be taken over by receiving physician team at this time, all decisions regarding the progression of care will be made at their discretion. Moose Rodriguez MD, [facility]  Patient endorsed to Dr. Izquierdo at the time of admission. Based on patient's history and physical exam, as well as the results of today's workup, I feel that patient warrants admission to the hospital for further workup/evaluation and continued management. I discussed the findings of today's workup with the patient and addressed the patient's questions and concerns. The patient was agreeable with admission. Our team spoke with the inpatient receiving team who accepted the patient for admission and subsequently took over the patient's care at the time of admission. The receiving team will follow up on pending labs, analgesia, any clinical imaging results, ancillary findings, reassess, and disposition as clinically indicated. Details of patient and plan conveyed to receiving physician team and conveyed back for understanding. There were no questions at this time about the patient's status, disposition, and plan. Patient's care to be taken over by receiving physician team at this time, all decisions regarding the progression of care will be made at their discretion.

## 2023-12-07 NOTE — ED PROVIDER NOTE - NSICDXPASTMEDICALHX_GEN_ALL_CORE_FT
PAST MEDICAL HISTORY:  Atrial fibrillation     Atrial flutter     Bladder cancer     CAD (coronary artery disease)     Cholangiocarcinoma     CVA (cerebrovascular accident)     History of COPD     Hypothyroid     S/P aortic valve replacement with bioprosthetic valve

## 2023-12-07 NOTE — CONSULT NOTE ADULT - SUBJECTIVE AND OBJECTIVE BOX
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 Was discharged to [facility] and has been on Dapto for six weeks. Discharged Mon to home w oral abx and began having L hip pain causing difficulty ambulating and so presented to the ED.      ROS: As documented in HPI, otherwise negative.    PAST MEDICAL & SURGICAL HISTORY:  Atrial fibrillation      Atrial flutter      CAD (coronary artery disease)      S/P aortic valve replacement with bioprosthetic valve      History of COPD      CVA (cerebrovascular accident)      Hypothyroid      Bladder cancer      Cholangiocarcinoma      S/P hip replacement, right        [] No significant past history as reviewed with the patient and family    MEDICATIONS  (STANDING):  apixaban 5 milliGRAM(s) Oral two times a day  atorvastatin 40 milliGRAM(s) Oral at bedtime  clopidogrel Tablet 75 milliGRAM(s) Oral daily  DAPTOmycin IVPB 400 milliGRAM(s) IV Intermittent every 24 hours  finasteride 5 milliGRAM(s) Oral daily  furosemide   Injectable 20 milliGRAM(s) IV Push daily  levothyroxine 75 MICROGram(s) Oral daily  metoprolol succinate  milliGRAM(s) Oral two times a day  potassium chloride    Tablet ER 10 milliEquivalent(s) Oral daily  senna 2 Tablet(s) Oral at bedtime  tamsulosin 0.4 milliGRAM(s) Oral at bedtime  tiotropium 2.5 MICROgram(s) Inhaler 2 Puff(s) Inhalation daily    MEDICATIONS  (PRN):    Allergies    penicillins (Other)    Intolerances        Vital Signs Last 24 Hrs  T(C): 36.6 (07 Dec 2023 19:46), Max: 36.8 (07 Dec 2023 11:00)  T(F): 97.9 (07 Dec 2023 19:46), Max: 98.3 (07 Dec 2023 11:00)  HR: 87 (07 Dec 2023 19:46) (84 - 89)  BP: 125/75 (07 Dec 2023 19:46) (113/62 - 134/74)  BP(mean): 75 (07 Dec 2023 18:24) (75 - 87)  RR: 18 (07 Dec 2023 19:46) (13 - 20)  SpO2: 99% (07 Dec 2023 19:46) (97% - 100%)    Parameters below as of 07 Dec 2023 19:46  Patient On (Oxygen Delivery Method): nasal cannula  O2 Flow (L/min): 2        PHYSICAL EXAM:  L hip   Skin intact, incision well healed, TTP over the L hip  FROM of the L hip w pain at 90  Able to SLR, neg log roll/axial  SILT, NVI                          8.0    10.04 )-----------( 203      ( 07 Dec 2023 11:10 )             25.6     12-07    140  |  105  |  44<H>  ----------------------------<  113<H>  4.7   |  20<L>  |  1.14    Ca    9.3      07 Dec 2023 11:10  Phos  4.2     12-07  Mg     2.4     12-07    TPro  7.3  /  Alb  3.2<L>  /  TBili  0.6  /  DBili  x   /  AST  54<H>  /  ALT  52<H>  /  AlkPhos  249<H>  12-07    PT/INR - ( 07 Dec 2023 11:10 )   PT: 20.6 sec;   INR: 1.91 ratio         PTT - ( 07 Dec 2023 11:10 )  PTT:28.7 sec  Urinalysis Basic - ( 07 Dec 2023 11:10 )    Color: x / Appearance: x / SG: x / pH: x  Gluc: 113 mg/dL / Ketone: x  / Bili: x / Urobili: x   Blood: x / Protein: x / Nitrite: x   Leuk Esterase: x / RBC: x / WBC x   Sq Epi: x / Non Sq Epi: x / Bacteria: x    IMAGING STUDIES:  XR R hip demonstrates intact R DARRYL without evidence of fx    89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 Was discharged to [facility] and has been on Dapto for six weeks. Discharged Mon to home w oral abx and began having L hip pain causing difficulty ambulating and so presented to the ED.      ROS: As documented in HPI, otherwise negative.    PAST MEDICAL & SURGICAL HISTORY:  Atrial fibrillation      Atrial flutter      CAD (coronary artery disease)      S/P aortic valve replacement with bioprosthetic valve      History of COPD      CVA (cerebrovascular accident)      Hypothyroid      Bladder cancer      Cholangiocarcinoma      S/P hip replacement, right        [] No significant past history as reviewed with the patient and family    MEDICATIONS  (STANDING):  apixaban 5 milliGRAM(s) Oral two times a day  atorvastatin 40 milliGRAM(s) Oral at bedtime  clopidogrel Tablet 75 milliGRAM(s) Oral daily  DAPTOmycin IVPB 400 milliGRAM(s) IV Intermittent every 24 hours  finasteride 5 milliGRAM(s) Oral daily  furosemide   Injectable 20 milliGRAM(s) IV Push daily  levothyroxine 75 MICROGram(s) Oral daily  metoprolol succinate  milliGRAM(s) Oral two times a day  potassium chloride    Tablet ER 10 milliEquivalent(s) Oral daily  senna 2 Tablet(s) Oral at bedtime  tamsulosin 0.4 milliGRAM(s) Oral at bedtime  tiotropium 2.5 MICROgram(s) Inhaler 2 Puff(s) Inhalation daily    MEDICATIONS  (PRN):    Allergies    penicillins (Other)    Intolerances        Vital Signs Last 24 Hrs  T(C): 36.6 (07 Dec 2023 19:46), Max: 36.8 (07 Dec 2023 11:00)  T(F): 97.9 (07 Dec 2023 19:46), Max: 98.3 (07 Dec 2023 11:00)  HR: 87 (07 Dec 2023 19:46) (84 - 89)  BP: 125/75 (07 Dec 2023 19:46) (113/62 - 134/74)  BP(mean): 75 (07 Dec 2023 18:24) (75 - 87)  RR: 18 (07 Dec 2023 19:46) (13 - 20)  SpO2: 99% (07 Dec 2023 19:46) (97% - 100%)    Parameters below as of 07 Dec 2023 19:46  Patient On (Oxygen Delivery Method): nasal cannula  O2 Flow (L/min): 2        PHYSICAL EXAM:  L hip   Skin intact, incision well healed, TTP over the L hip  FROM of the L hip w pain at 90  Able to SLR, neg log roll/axial  SILT, NVI                          8.0    10.04 )-----------( 203      ( 07 Dec 2023 11:10 )             25.6     12-07    140  |  105  |  44<H>  ----------------------------<  113<H>  4.7   |  20<L>  |  1.14    Ca    9.3      07 Dec 2023 11:10  Phos  4.2     12-07  Mg     2.4     12-07    TPro  7.3  /  Alb  3.2<L>  /  TBili  0.6  /  DBili  x   /  AST  54<H>  /  ALT  52<H>  /  AlkPhos  249<H>  12-07    PT/INR - ( 07 Dec 2023 11:10 )   PT: 20.6 sec;   INR: 1.91 ratio         PTT - ( 07 Dec 2023 11:10 )  PTT:28.7 sec  Urinalysis Basic - ( 07 Dec 2023 11:10 )    Color: x / Appearance: x / SG: x / pH: x  Gluc: 113 mg/dL / Ketone: x  / Bili: x / Urobili: x   Blood: x / Protein: x / Nitrite: x   Leuk Esterase: x / RBC: x / WBC x   Sq Epi: x / Non Sq Epi: x / Bacteria: x    IMAGING STUDIES:  XR R hip demonstrates intact R DARRYL without evidence of fx    89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 Was discharged to [facility] and has been on Dapto for six weeks. Discharged Mon to home w oral abx and began having L hip pain causing difficulty ambulating and so presented to the ED.      ROS: As documented in HPI, otherwise negative.    PAST MEDICAL & SURGICAL HISTORY:  Atrial fibrillation      Atrial flutter      CAD (coronary artery disease)      S/P aortic valve replacement with bioprosthetic valve      History of COPD      CVA (cerebrovascular accident)      Hypothyroid      Bladder cancer      Cholangiocarcinoma      S/P hip replacement, right        [] No significant past history as reviewed with the patient and family    MEDICATIONS  (STANDING):  apixaban 5 milliGRAM(s) Oral two times a day  atorvastatin 40 milliGRAM(s) Oral at bedtime  clopidogrel Tablet 75 milliGRAM(s) Oral daily  DAPTOmycin IVPB 400 milliGRAM(s) IV Intermittent every 24 hours  finasteride 5 milliGRAM(s) Oral daily  furosemide   Injectable 20 milliGRAM(s) IV Push daily  levothyroxine 75 MICROGram(s) Oral daily  metoprolol succinate  milliGRAM(s) Oral two times a day  potassium chloride    Tablet ER 10 milliEquivalent(s) Oral daily  senna 2 Tablet(s) Oral at bedtime  tamsulosin 0.4 milliGRAM(s) Oral at bedtime  tiotropium 2.5 MICROgram(s) Inhaler 2 Puff(s) Inhalation daily    MEDICATIONS  (PRN):    Allergies    penicillins (Other)    Intolerances        Vital Signs Last 24 Hrs  T(C): 36.6 (07 Dec 2023 19:46), Max: 36.8 (07 Dec 2023 11:00)  T(F): 97.9 (07 Dec 2023 19:46), Max: 98.3 (07 Dec 2023 11:00)  HR: 87 (07 Dec 2023 19:46) (84 - 89)  BP: 125/75 (07 Dec 2023 19:46) (113/62 - 134/74)  BP(mean): 75 (07 Dec 2023 18:24) (75 - 87)  RR: 18 (07 Dec 2023 19:46) (13 - 20)  SpO2: 99% (07 Dec 2023 19:46) (97% - 100%)    Parameters below as of 07 Dec 2023 19:46  Patient On (Oxygen Delivery Method): nasal cannula  O2 Flow (L/min): 2        PHYSICAL EXAM:  L hip   Skin intact, incision well healed, TTP over the L hip  FROM of the L hip w pain at 90  Able to SLR, neg log roll/axial  SILT, NVI                          8.0    10.04 )-----------( 203      ( 07 Dec 2023 11:10 )             25.6     12-07    140  |  105  |  44<H>  ----------------------------<  113<H>  4.7   |  20<L>  |  1.14    Ca    9.3      07 Dec 2023 11:10  Phos  4.2     12-07  Mg     2.4     12-07    TPro  7.3  /  Alb  3.2<L>  /  TBili  0.6  /  DBili  x   /  AST  54<H>  /  ALT  52<H>  /  AlkPhos  249<H>  12-07    PT/INR - ( 07 Dec 2023 11:10 )   PT: 20.6 sec;   INR: 1.91 ratio         PTT - ( 07 Dec 2023 11:10 )  PTT:28.7 sec  Urinalysis Basic - ( 07 Dec 2023 11:10 )    Color: x / Appearance: x / SG: x / pH: x  Gluc: 113 mg/dL / Ketone: x  / Bili: x / Urobili: x   Blood: x / Protein: x / Nitrite: x   Leuk Esterase: x / RBC: x / WBC x   Sq Epi: x / Non Sq Epi: x / Bacteria: x    IMAGING STUDIES:  XR R hip demonstrates intact R DARRYL without evidence of fx    89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 Was discharged to [facility] and has been on Dapto for six weeks. Discharged Mon to home w oral abx and began having L hip pain causing difficulty ambulating and so presented to the ED.      ROS: As documented in HPI, otherwise negative.    PAST MEDICAL & SURGICAL HISTORY:  Atrial fibrillation      Atrial flutter      CAD (coronary artery disease)      S/P aortic valve replacement with bioprosthetic valve      History of COPD      CVA (cerebrovascular accident)      Hypothyroid      Bladder cancer      Cholangiocarcinoma      S/P hip replacement, right        [] No significant past history as reviewed with the patient and family    MEDICATIONS  (STANDING):  apixaban 5 milliGRAM(s) Oral two times a day  atorvastatin 40 milliGRAM(s) Oral at bedtime  clopidogrel Tablet 75 milliGRAM(s) Oral daily  DAPTOmycin IVPB 400 milliGRAM(s) IV Intermittent every 24 hours  finasteride 5 milliGRAM(s) Oral daily  furosemide   Injectable 20 milliGRAM(s) IV Push daily  levothyroxine 75 MICROGram(s) Oral daily  metoprolol succinate  milliGRAM(s) Oral two times a day  potassium chloride    Tablet ER 10 milliEquivalent(s) Oral daily  senna 2 Tablet(s) Oral at bedtime  tamsulosin 0.4 milliGRAM(s) Oral at bedtime  tiotropium 2.5 MICROgram(s) Inhaler 2 Puff(s) Inhalation daily    MEDICATIONS  (PRN):    Allergies    penicillins (Other)    Intolerances        Vital Signs Last 24 Hrs  T(C): 36.6 (07 Dec 2023 19:46), Max: 36.8 (07 Dec 2023 11:00)  T(F): 97.9 (07 Dec 2023 19:46), Max: 98.3 (07 Dec 2023 11:00)  HR: 87 (07 Dec 2023 19:46) (84 - 89)  BP: 125/75 (07 Dec 2023 19:46) (113/62 - 134/74)  BP(mean): 75 (07 Dec 2023 18:24) (75 - 87)  RR: 18 (07 Dec 2023 19:46) (13 - 20)  SpO2: 99% (07 Dec 2023 19:46) (97% - 100%)    Parameters below as of 07 Dec 2023 19:46  Patient On (Oxygen Delivery Method): nasal cannula  O2 Flow (L/min): 2        PHYSICAL EXAM:  L hip   Skin intact, incision well healed, TTP over the L hip  FROM of the L hip w pain at 90  Able to SLR, neg log roll/axial  SILT, NVI                          8.0    10.04 )-----------( 203      ( 07 Dec 2023 11:10 )             25.6     12-07    140  |  105  |  44<H>  ----------------------------<  113<H>  4.7   |  20<L>  |  1.14    Ca    9.3      07 Dec 2023 11:10  Phos  4.2     12-07  Mg     2.4     12-07    TPro  7.3  /  Alb  3.2<L>  /  TBili  0.6  /  DBili  x   /  AST  54<H>  /  ALT  52<H>  /  AlkPhos  249<H>  12-07    PT/INR - ( 07 Dec 2023 11:10 )   PT: 20.6 sec;   INR: 1.91 ratio         PTT - ( 07 Dec 2023 11:10 )  PTT:28.7 sec  Urinalysis Basic - ( 07 Dec 2023 11:10 )    Color: x / Appearance: x / SG: x / pH: x  Gluc: 113 mg/dL / Ketone: x  / Bili: x / Urobili: x   Blood: x / Protein: x / Nitrite: x   Leuk Esterase: x / RBC: x / WBC x   Sq Epi: x / Non Sq Epi: x / Bacteria: x    IMAGING STUDIES:  XR R hip demonstrates intact R DARRYL without evidence of fx    89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 to follow  - f/u BCx, ESR/CRP  - f/u ID consult

## 2023-12-07 NOTE — H&P ADULT - ASSESSMENT
89M with history of HTN, HLD, COPD (on intermittent 2L NC), CAD (no stents), s/p bioprosthetic AVR, CVA in 10/2022, s/p R total hip replacement ~30 years ago, afib/aflutter, hypothyroid, CKD, bladder cancer, and cholangiocarcinoma (s/p treatment, stable) presenting from home with worsening R hip pain. Accompanied by granddaughter at bedside. Patient was found to have UTI in 9/2023 and was treated for 1 month. In 10/2023, patient was found to be bacteremic with septic joint on R hip, was treated with 6 week course of IV antibiotics without surgical debridement or operation given age and other comorbidities. Patient was discharged from [place] to [facility], was able to work with PT appropriately. He finished last dose of IV antibiotics and was discharged home on Tuesday. He was seen by Dr. Redding in clinic yesterday and was being planned for IV antibiotics but has not started yet. He started having significant pain on the R hip again since discharge, now unable to bear weight. No fever or chills. No falls or trauma    right hip infection  - Blood cultures times 2  - daptomycin 6 mg/kg q day  - Consult Dr. Vickers, ( ortho)  as family is now willing to consider surgery to improve his quality of life   - ID consult appreciated    aravind lower ext edema  - check doppler  - iv lasix    afib , AVR  - c/w metoprolol  - c/w eliquis    CAD  - c/w plavix    COPD  - c/w inhalers    BPH  - flomax and finasteride    HLD  - c/w lipitor    dvt px  - on eliquis                  89M with history of HTN, HLD, COPD (on intermittent 2L NC), CAD (no stents), s/p bioprosthetic AVR, CVA in 10/2022, s/p R total hip replacement ~30 years ago, afib/aflutter, hypothyroid, CKD, bladder cancer, and cholangiocarcinoma (s/p treatment, stable) presenting from home with worsening R hip pain. Accompanied by granddaughter at bedside. Patient was found to have UTI in 9/2023 and was treated for 1 month. In 10/2023, patient was found to be bacteremic with septic joint on R hip, was treated with 6 week course of IV antibiotics without surgical debridement or operation given age and other comorbidities. Patient was discharged from [place] to [facility], was able to work with PT appropriately. He finished last dose of IV antibiotics and was discharged home on Tuesday. He was seen by Dr. Redding in clinic yesterday and was being planned for IV antibiotics but has not started yet. He started having significant pain on the R hip again since discharge, now unable to bear weight. No fever or chills. No falls or trauma    right hip infection  - Blood cultures times 2  - daptomycin 6 mg/kg q day  - Consult Dr. Vickers, ( ortho)  as family is now willing to consider surgery to improve his quality of life   - ID consult appreciated    aravind lower ext edema  - check doppler  - iv lasix    afib , AVR  - c/w metoprolol  - c/w eliquis    CAD  - c/w plavix    COPD  - c/w inhalers    BPH  - flomax and finasteride    HLD  - c/w lipitor    dvt px  - on eliquis

## 2023-12-07 NOTE — H&P ADULT - SOCIAL HISTORY
Received incoming call from pt stating she is unable to be seen by sleep specialist until 5/2017; Pt states she would like to try vyvanse and pay out of pocket, new insurance will be available in the new year and then would like to have PA submitted to new insurance to see if approval can be obtained. If not approved will continue to pay out of pocket until can be seen by sleep specialist.  Pt would also like to know of further recommendations for sleep specialist. If agreeable pt would like to come to office to  rx script for vyvanse and vyvanse coupon. No

## 2023-12-07 NOTE — PATIENT PROFILE ADULT - NSPROPTRIGHTCAREGIVER_GEN_A_NUR
[Headache] : headache [Recent Weight Gain (___ Lbs)] : recent [unfilled] ~Ulb weight gain [Blurry Vision] : blurred vision [Eye Pain] : eye pain [Eyeglasses] : currently wearing eyeglasses [Change In The Stool] : change in stool [Feeling Fatigued] : not feeling fatigued no

## 2023-12-07 NOTE — ED PROVIDER NOTE - PROGRESS NOTE DETAILS
ID consulted. Cultures sent. Administering IV daptomycin x1 as per Dr. Redding's recs. XR without acute findings. Pending ortho input, Dr. Redding will reach out for consultation. Planning for medicine admission.

## 2023-12-07 NOTE — CONSULT NOTE ADULT - ATTENDING COMMENTS
I agree with the above note and have personally seen and examined this patient. All pertinent films have been reviewed. Please refer to clinical documentation of the history, physical examinations, data summary, and both assessment and plan as documented above and with which I agree.    In brief, this ia 89M complex medical history. Has a R DARRYL from 30 years ago s/p I&D and PE exchagne 10 years ago at OSH. 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 Just stopped abx and pain worsened. Cannot bear weight.     ROS: As documented in HPI, otherwise negative.    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  As discussed w patient and team, this is a highly complex problem. He has a chronic R DARRYL PJI. This can be treated nonoperatively with chronic suppression or operatively with  I&D with or without poly exchange versus explatation and girdlestone resection versus 2-stage exchange/spacer. However, the implant that is in place is quite difficulty to remove and would potentially require fracturing open the bone to remove this without any guarantee that I can reimplant a DARRYL in the future. He would liekly require multiple blood units transfusion as this surgery is associated with >750cc EBL. I&D alone has no real chance to clear this infection given the length of time he has had the infection and benefits may be short term at best. It may be valuable to have a drain placed in the hip as another palliative option. Surgery would be highly risky including risk of losing his life or limb. These various options were preliminary presented to the patient. He wants some time to consider his options. I will continue to follow him while in the hospital so that we can determine the best plan per his wishes.    - WBAT RLE  - f/u CT R hip w/wo con (metal artifact reduction) to evaluate for abscess cavity  - Ortho will cont. to follow  - f/u BCx, ESR/CRP  - f/u ID consult ([place])  - may need u/s to eval valve for vegetations  - ABX per ID  - DVT PPX per primary    Gene Vickers MD  Attending Orthopedic Surgeon I agree with the above note and have personally seen and examined this patient. All pertinent films have been reviewed. Please refer to clinical documentation of the history, physical examinations, data summary, and both assessment and plan as documented above and with which I agree.    In brief, this ia 89M complex medical history. Has a R DARRYL from 30 years ago s/p I&D and PE exchagne 10 years ago at OSH. 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 Just stopped abx and pain worsened. Cannot bear weight.     ROS: As documented in HPI, otherwise negative.    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  As discussed w patient and team, this is a highly complex problem. He has a chronic R DARRYL PJI. This can be treated nonoperatively with chronic suppression or operatively with  I&D with or without poly exchange versus explatation and girdlestone resection versus 2-stage exchange/spacer. However, the implant that is in place is quite difficulty to remove and would potentially require fracturing open the bone to remove this without any guarantee that I can reimplant a DARRYL in the future. He would liekly require multiple blood units transfusion as this surgery is associated with >750cc EBL. I&D alone has no real chance to clear this infection given the length of time he has had the infection and benefits may be short term at best. It may be valuable to have a drain placed in the hip as another palliative option. Surgery would be highly risky including risk of losing his life or limb. These various options were preliminary presented to the patient. He wants some time to consider his options. I will continue to follow him while in the hospital so that we can determine the best plan per his wishes.    - WBAT RLE  - f/u CT R hip w/wo con (metal artifact reduction) to evaluate for abscess cavity  - Ortho will cont. to follow  - f/u BCx, ESR/CRP  - f/u ID consult ([place])  - may need u/s to eval valve for vegetations  - ABX per ID  - DVT PPX per primary    Gene Vickers MD  Attending Orthopedic Surgeon

## 2023-12-07 NOTE — ED ADULT NURSE NOTE - NSFALLRISKFACTORS_ED_ALL_ED
Age: 85 years old or older Pre-Excision Curettage Text (Leave Blank If You Do Not Want): Prior to drawing the surgical margin the visible lesion was removed with electrodesiccation and curettage to clearly define the lesion size.

## 2023-12-07 NOTE — H&P ADULT - NSHPLABSRESULTS_GEN_ALL_CORE
LABS:                        8.0    10.04 )-----------( 203      ( 07 Dec 2023 11:10 )             25.6     12-07    140  |  105  |  44<H>  ----------------------------<  113<H>  4.7   |  20<L>  |  1.14    Ca    9.3      07 Dec 2023 11:10  Phos  4.2     12-07  Mg     2.4     12-07    TPro  7.3  /  Alb  3.2<L>  /  TBili  0.6  /  DBili  x   /  AST  54<H>  /  ALT  52<H>  /  AlkPhos  249<H>  12-07    PT/INR - ( 07 Dec 2023 11:10 )   PT: 20.6 sec;   INR: 1.91 ratio         PTT - ( 07 Dec 2023 11:10 )  PTT:28.7 sec  Urinalysis Basic - ( 07 Dec 2023 11:10 )    Color: x / Appearance: x / SG: x / pH: x  Gluc: 113 mg/dL / Ketone: x  / Bili: x / Urobili: x   Blood: x / Protein: x / Nitrite: x   Leuk Esterase: x / RBC: x / WBC x   Sq Epi: x / Non Sq Epi: x / Bacteria: x            RADIOLOGY & ADDITIONAL TESTS:

## 2023-12-07 NOTE — H&P ADULT - HISTORY OF PRESENT ILLNESS
89M with history of HTN, HLD, COPD (on intermittent 2L NC), CAD (no stents), s/p bioprosthetic AVR, CVA in 10/2022, s/p R total hip replacement ~30 years ago, afib/aflutter, hypothyroid, CKD, bladder cancer, and cholangiocarcinoma (s/p treatment, stable) presenting from home with worsening R hip pain. Accompanied by granddaughter at bedside. Patient was found to have UTI in 9/2023 and was treated for 1 month. In 10/2023, patient was found to be bacteremic with septic joint on R hip, was treated with 6 week course of IV antibiotics without surgical debridement or operation given age and other comorbidities. Patient was discharged from [place] to [facility], was able to work with PT appropriately. He finished last dose of IV antibiotics and was discharged home on Tuesday. He was seen by Dr. Redding in clinic yesterday and was being planned for IV antibiotics but has not started yet. He started having significant pain on the R hip again since discharge, now unable to bear weight. No fever or chills. No falls or trauma  89M with history of HTN, HLD, COPD (on intermittent 2L NC), CAD (no stents), s/p bioprosthetic AVR, CVA in 10/2022, s/p R total hip replacement ~30 years ago, afib/aflutter, hypothyroid, CKD, bladder cancer, and cholangiocarcinoma (s/p treatment, stable) presenting from home with worsening R hip pain. Accompanied by granddaughter at bedside. Patient was found to have UTI in 9/2023 and was treated for 1 month. In 10/2023, patient was found to be bacteremic with septic joint on R hip, was treated with 6 week course of IV antibiotics without surgical debridement or operation given age and other comorbidities. Patient was discharged from [place] to [facility], was able to work with PT appropriately. He finished last dose of IV antibiotics and was discharged home on Tuesday. He was seen by Dr. Redding in clinic yesterday and was being planned for IV antibiotics but has not started yet. He started having significant pain on the R hip again since discharge, now unable to bear weight. No fever or chills. No falls or trauma

## 2023-12-07 NOTE — PATIENT PROFILE ADULT - FUNCTIONAL ASSESSMENT - BASIC MOBILITY 6.
1-calculated by average/Not able to assess (calculate score using [facility] averaging method)  1-calculated by average/Not able to assess (calculate score using [facility] averaging method)

## 2023-12-08 LAB
ANION GAP SERPL CALC-SCNC: 11 MMOL/L — SIGNIFICANT CHANGE UP (ref 5–17)
ANION GAP SERPL CALC-SCNC: 11 MMOL/L — SIGNIFICANT CHANGE UP (ref 5–17)
BASE EXCESS BLDV CALC-SCNC: -0.5 MMOL/L — SIGNIFICANT CHANGE UP (ref -2–3)
BASE EXCESS BLDV CALC-SCNC: -0.5 MMOL/L — SIGNIFICANT CHANGE UP (ref -2–3)
BLD GP AB SCN SERPL QL: NEGATIVE — SIGNIFICANT CHANGE UP
BLD GP AB SCN SERPL QL: NEGATIVE — SIGNIFICANT CHANGE UP
BUN SERPL-MCNC: 37 MG/DL — HIGH (ref 7–23)
BUN SERPL-MCNC: 37 MG/DL — HIGH (ref 7–23)
CA-I SERPL-SCNC: 1.28 MMOL/L — SIGNIFICANT CHANGE UP (ref 1.15–1.33)
CA-I SERPL-SCNC: 1.28 MMOL/L — SIGNIFICANT CHANGE UP (ref 1.15–1.33)
CALCIUM SERPL-MCNC: 8.8 MG/DL — SIGNIFICANT CHANGE UP (ref 8.4–10.5)
CALCIUM SERPL-MCNC: 8.8 MG/DL — SIGNIFICANT CHANGE UP (ref 8.4–10.5)
CHLORIDE BLDV-SCNC: 106 MMOL/L — SIGNIFICANT CHANGE UP (ref 96–108)
CHLORIDE BLDV-SCNC: 106 MMOL/L — SIGNIFICANT CHANGE UP (ref 96–108)
CHLORIDE SERPL-SCNC: 107 MMOL/L — SIGNIFICANT CHANGE UP (ref 96–108)
CHLORIDE SERPL-SCNC: 107 MMOL/L — SIGNIFICANT CHANGE UP (ref 96–108)
CO2 BLDV-SCNC: 27 MMOL/L — HIGH (ref 22–26)
CO2 BLDV-SCNC: 27 MMOL/L — HIGH (ref 22–26)
CO2 SERPL-SCNC: 22 MMOL/L — SIGNIFICANT CHANGE UP (ref 22–31)
CO2 SERPL-SCNC: 22 MMOL/L — SIGNIFICANT CHANGE UP (ref 22–31)
CREAT SERPL-MCNC: 1.06 MG/DL — SIGNIFICANT CHANGE UP (ref 0.5–1.3)
CREAT SERPL-MCNC: 1.06 MG/DL — SIGNIFICANT CHANGE UP (ref 0.5–1.3)
CRP SERPL-MCNC: 68 MG/L — HIGH (ref 0–4)
CRP SERPL-MCNC: 68 MG/L — HIGH (ref 0–4)
EGFR: 65 ML/MIN/1.73M2 — SIGNIFICANT CHANGE UP
EGFR: 65 ML/MIN/1.73M2 — SIGNIFICANT CHANGE UP
FERRITIN SERPL-MCNC: 262 NG/ML — SIGNIFICANT CHANGE UP (ref 30–400)
FERRITIN SERPL-MCNC: 262 NG/ML — SIGNIFICANT CHANGE UP (ref 30–400)
FOLATE SERPL-MCNC: >20 NG/ML — SIGNIFICANT CHANGE UP
FOLATE SERPL-MCNC: >20 NG/ML — SIGNIFICANT CHANGE UP
GAS PNL BLDV: 138 MMOL/L — SIGNIFICANT CHANGE UP (ref 136–145)
GAS PNL BLDV: 138 MMOL/L — SIGNIFICANT CHANGE UP (ref 136–145)
GAS PNL BLDV: SIGNIFICANT CHANGE UP
GLUCOSE BLDV-MCNC: 109 MG/DL — HIGH (ref 70–99)
GLUCOSE BLDV-MCNC: 109 MG/DL — HIGH (ref 70–99)
GLUCOSE SERPL-MCNC: 102 MG/DL — HIGH (ref 70–99)
GLUCOSE SERPL-MCNC: 102 MG/DL — HIGH (ref 70–99)
HCO3 BLDV-SCNC: 25 MMOL/L — SIGNIFICANT CHANGE UP (ref 22–29)
HCO3 BLDV-SCNC: 25 MMOL/L — SIGNIFICANT CHANGE UP (ref 22–29)
HCT VFR BLD CALC: 22.6 % — LOW (ref 39–50)
HCT VFR BLD CALC: 22.6 % — LOW (ref 39–50)
HCT VFR BLD CALC: 24 % — LOW (ref 39–50)
HCT VFR BLD CALC: 24 % — LOW (ref 39–50)
HCT VFR BLDA CALC: 25 % — LOW (ref 39–51)
HCT VFR BLDA CALC: 25 % — LOW (ref 39–51)
HGB BLD CALC-MCNC: 8.2 G/DL — LOW (ref 12.6–17.4)
HGB BLD CALC-MCNC: 8.2 G/DL — LOW (ref 12.6–17.4)
HGB BLD-MCNC: 7.1 G/DL — LOW (ref 13–17)
HGB BLD-MCNC: 7.1 G/DL — LOW (ref 13–17)
HGB BLD-MCNC: 7.4 G/DL — LOW (ref 13–17)
HGB BLD-MCNC: 7.4 G/DL — LOW (ref 13–17)
IRON SATN MFR SERPL: 19 UG/DL — LOW (ref 45–165)
IRON SATN MFR SERPL: 19 UG/DL — LOW (ref 45–165)
IRON SATN MFR SERPL: 9 % — LOW (ref 16–55)
IRON SATN MFR SERPL: 9 % — LOW (ref 16–55)
LACTATE BLDV-MCNC: 1.7 MMOL/L — SIGNIFICANT CHANGE UP (ref 0.5–2)
LACTATE BLDV-MCNC: 1.7 MMOL/L — SIGNIFICANT CHANGE UP (ref 0.5–2)
MAGNESIUM SERPL-MCNC: 2.3 MG/DL — SIGNIFICANT CHANGE UP (ref 1.6–2.6)
MAGNESIUM SERPL-MCNC: 2.3 MG/DL — SIGNIFICANT CHANGE UP (ref 1.6–2.6)
MCHC RBC-ENTMCNC: 30.8 GM/DL — LOW (ref 32–36)
MCHC RBC-ENTMCNC: 30.8 GM/DL — LOW (ref 32–36)
MCHC RBC-ENTMCNC: 31.4 GM/DL — LOW (ref 32–36)
MCHC RBC-ENTMCNC: 31.4 GM/DL — LOW (ref 32–36)
MCHC RBC-ENTMCNC: 31.4 PG — SIGNIFICANT CHANGE UP (ref 27–34)
MCHC RBC-ENTMCNC: 31.4 PG — SIGNIFICANT CHANGE UP (ref 27–34)
MCHC RBC-ENTMCNC: 31.7 PG — SIGNIFICANT CHANGE UP (ref 27–34)
MCHC RBC-ENTMCNC: 31.7 PG — SIGNIFICANT CHANGE UP (ref 27–34)
MCV RBC AUTO: 100.9 FL — HIGH (ref 80–100)
MCV RBC AUTO: 100.9 FL — HIGH (ref 80–100)
MCV RBC AUTO: 101.7 FL — HIGH (ref 80–100)
MCV RBC AUTO: 101.7 FL — HIGH (ref 80–100)
NRBC # BLD: 0 /100 WBCS — SIGNIFICANT CHANGE UP (ref 0–0)
PCO2 BLDV: 47 MMHG — SIGNIFICANT CHANGE UP (ref 42–55)
PCO2 BLDV: 47 MMHG — SIGNIFICANT CHANGE UP (ref 42–55)
PH BLDV: 7.34 — SIGNIFICANT CHANGE UP (ref 7.32–7.43)
PH BLDV: 7.34 — SIGNIFICANT CHANGE UP (ref 7.32–7.43)
PHOSPHATE SERPL-MCNC: 3.6 MG/DL — SIGNIFICANT CHANGE UP (ref 2.5–4.5)
PHOSPHATE SERPL-MCNC: 3.6 MG/DL — SIGNIFICANT CHANGE UP (ref 2.5–4.5)
PLATELET # BLD AUTO: 174 K/UL — SIGNIFICANT CHANGE UP (ref 150–400)
PLATELET # BLD AUTO: 174 K/UL — SIGNIFICANT CHANGE UP (ref 150–400)
PLATELET # BLD AUTO: 184 K/UL — SIGNIFICANT CHANGE UP (ref 150–400)
PLATELET # BLD AUTO: 184 K/UL — SIGNIFICANT CHANGE UP (ref 150–400)
PO2 BLDV: 78 MMHG — HIGH (ref 25–45)
PO2 BLDV: 78 MMHG — HIGH (ref 25–45)
POTASSIUM BLDV-SCNC: 3.9 MMOL/L — SIGNIFICANT CHANGE UP (ref 3.5–5.1)
POTASSIUM BLDV-SCNC: 3.9 MMOL/L — SIGNIFICANT CHANGE UP (ref 3.5–5.1)
POTASSIUM SERPL-MCNC: 3.8 MMOL/L — SIGNIFICANT CHANGE UP (ref 3.5–5.3)
POTASSIUM SERPL-MCNC: 3.8 MMOL/L — SIGNIFICANT CHANGE UP (ref 3.5–5.3)
POTASSIUM SERPL-SCNC: 3.8 MMOL/L — SIGNIFICANT CHANGE UP (ref 3.5–5.3)
POTASSIUM SERPL-SCNC: 3.8 MMOL/L — SIGNIFICANT CHANGE UP (ref 3.5–5.3)
PROCALCITONIN SERPL-MCNC: 0.15 NG/ML — HIGH (ref 0.02–0.1)
PROCALCITONIN SERPL-MCNC: 0.15 NG/ML — HIGH (ref 0.02–0.1)
RBC # BLD: 2.24 M/UL — LOW (ref 4.2–5.8)
RBC # BLD: 2.24 M/UL — LOW (ref 4.2–5.8)
RBC # BLD: 2.36 M/UL — LOW (ref 4.2–5.8)
RBC # BLD: 2.36 M/UL — LOW (ref 4.2–5.8)
RBC # FLD: 16 % — HIGH (ref 10.3–14.5)
RH IG SCN BLD-IMP: POSITIVE — SIGNIFICANT CHANGE UP
RH IG SCN BLD-IMP: POSITIVE — SIGNIFICANT CHANGE UP
SAO2 % BLDV: 95.6 % — HIGH (ref 67–88)
SAO2 % BLDV: 95.6 % — HIGH (ref 67–88)
SODIUM SERPL-SCNC: 140 MMOL/L — SIGNIFICANT CHANGE UP (ref 135–145)
SODIUM SERPL-SCNC: 140 MMOL/L — SIGNIFICANT CHANGE UP (ref 135–145)
T4 FREE SERPL-MCNC: 0.7 NG/DL — LOW (ref 0.9–1.8)
T4 FREE SERPL-MCNC: 0.7 NG/DL — LOW (ref 0.9–1.8)
TIBC SERPL-MCNC: 207 UG/DL — LOW (ref 220–430)
TIBC SERPL-MCNC: 207 UG/DL — LOW (ref 220–430)
TSH SERPL-MCNC: 7.42 UIU/ML — HIGH (ref 0.27–4.2)
TSH SERPL-MCNC: 7.42 UIU/ML — HIGH (ref 0.27–4.2)
UIBC SERPL-MCNC: 189 UG/DL — SIGNIFICANT CHANGE UP (ref 110–370)
UIBC SERPL-MCNC: 189 UG/DL — SIGNIFICANT CHANGE UP (ref 110–370)
VIT B12 SERPL-MCNC: 861 PG/ML — SIGNIFICANT CHANGE UP (ref 232–1245)
VIT B12 SERPL-MCNC: 861 PG/ML — SIGNIFICANT CHANGE UP (ref 232–1245)
WBC # BLD: 10.62 K/UL — HIGH (ref 3.8–10.5)
WBC # BLD: 10.62 K/UL — HIGH (ref 3.8–10.5)
WBC # BLD: 9.56 K/UL — SIGNIFICANT CHANGE UP (ref 3.8–10.5)
WBC # BLD: 9.56 K/UL — SIGNIFICANT CHANGE UP (ref 3.8–10.5)
WBC # FLD AUTO: 10.62 K/UL — HIGH (ref 3.8–10.5)
WBC # FLD AUTO: 10.62 K/UL — HIGH (ref 3.8–10.5)
WBC # FLD AUTO: 9.56 K/UL — SIGNIFICANT CHANGE UP (ref 3.8–10.5)
WBC # FLD AUTO: 9.56 K/UL — SIGNIFICANT CHANGE UP (ref 3.8–10.5)

## 2023-12-08 PROCEDURE — 99233 SBSQ HOSP IP/OBS HIGH 50: CPT

## 2023-12-08 PROCEDURE — 71045 X-RAY EXAM CHEST 1 VIEW: CPT | Mod: 26

## 2023-12-08 PROCEDURE — 72193 CT PELVIS W/DYE: CPT | Mod: 26

## 2023-12-08 PROCEDURE — 93970 EXTREMITY STUDY: CPT | Mod: 26

## 2023-12-08 PROCEDURE — 93306 TTE W/DOPPLER COMPLETE: CPT | Mod: 26

## 2023-12-08 RX ORDER — IRON SUCROSE 20 MG/ML
200 INJECTION, SOLUTION INTRAVENOUS EVERY 24 HOURS
Refills: 0 | Status: COMPLETED | OUTPATIENT
Start: 2023-12-08 | End: 2023-12-10

## 2023-12-08 RX ORDER — IPRATROPIUM/ALBUTEROL SULFATE 18-103MCG
3 AEROSOL WITH ADAPTER (GRAM) INHALATION EVERY 6 HOURS
Refills: 0 | Status: DISCONTINUED | OUTPATIENT
Start: 2023-12-08 | End: 2023-12-18

## 2023-12-08 RX ORDER — SODIUM CHLORIDE 0.65 %
1 AEROSOL, SPRAY (ML) NASAL
Refills: 0 | Status: DISCONTINUED | OUTPATIENT
Start: 2023-12-08 | End: 2023-12-18

## 2023-12-08 RX ORDER — SODIUM CHLORIDE 0.65 %
0 AEROSOL, SPRAY (ML) NASAL
Refills: 0 | DISCHARGE

## 2023-12-08 RX ADMIN — Medication 10 MILLIEQUIVALENT(S): at 12:23

## 2023-12-08 RX ADMIN — DAPTOMYCIN 116 MILLIGRAM(S): 500 INJECTION, POWDER, LYOPHILIZED, FOR SOLUTION INTRAVENOUS at 14:11

## 2023-12-08 RX ADMIN — TAMSULOSIN HYDROCHLORIDE 0.4 MILLIGRAM(S): 0.4 CAPSULE ORAL at 21:37

## 2023-12-08 RX ADMIN — APIXABAN 5 MILLIGRAM(S): 2.5 TABLET, FILM COATED ORAL at 18:09

## 2023-12-08 RX ADMIN — IRON SUCROSE 110 MILLIGRAM(S): 20 INJECTION, SOLUTION INTRAVENOUS at 15:24

## 2023-12-08 RX ADMIN — Medication 20 MILLIGRAM(S): at 05:21

## 2023-12-08 RX ADMIN — ATORVASTATIN CALCIUM 40 MILLIGRAM(S): 80 TABLET, FILM COATED ORAL at 21:36

## 2023-12-08 RX ADMIN — CLOPIDOGREL BISULFATE 75 MILLIGRAM(S): 75 TABLET, FILM COATED ORAL at 12:23

## 2023-12-08 RX ADMIN — FINASTERIDE 5 MILLIGRAM(S): 5 TABLET, FILM COATED ORAL at 12:23

## 2023-12-08 RX ADMIN — Medication 100 MILLIGRAM(S): at 18:09

## 2023-12-08 RX ADMIN — Medication 3 MILLILITER(S): at 18:08

## 2023-12-08 RX ADMIN — APIXABAN 5 MILLIGRAM(S): 2.5 TABLET, FILM COATED ORAL at 05:17

## 2023-12-08 RX ADMIN — Medication 1 SPRAY(S): at 15:23

## 2023-12-08 RX ADMIN — Medication 3 MILLILITER(S): at 23:00

## 2023-12-08 RX ADMIN — Medication 100 MILLIGRAM(S): at 05:17

## 2023-12-08 RX ADMIN — SENNA PLUS 2 TABLET(S): 8.6 TABLET ORAL at 21:35

## 2023-12-08 RX ADMIN — TIOTROPIUM BROMIDE 2 PUFF(S): 18 CAPSULE ORAL; RESPIRATORY (INHALATION) at 12:23

## 2023-12-08 RX ADMIN — Medication 75 MICROGRAM(S): at 05:18

## 2023-12-08 NOTE — PROGRESS NOTE ADULT - ASSESSMENT
89M with history of HTN, HLD, COPD (on intermittent 2L NC), CAD (no stents), s/p bioprosthetic AVR, CVA in 10/2022, s/p R total hip replacement ~30 years ago, afib/aflutter, hypothyroid, CKD, bladder cancer, and cholangiocarcinoma (s/p treatment, stable) presenting from home with worsening R hip pain. Accompanied by granddaughter at bedside. Patient was found to have UTI in 9/2023 and was treated for 1 month. In 10/2023, patient was found to be bacteremic with septic joint on R hip, was treated with 6 week course of IV antibiotics without surgical debridement or operation given age and other comorbidities. Patient was discharged from [place] to [facility], was able to work with PT appropriately. He finished last dose of IV antibiotics and was discharged home on Tuesday. He was seen by Dr. Redding in clinic yesterday and was being planned for IV antibiotics but has not started yet. He started having significant pain on the R hip again since discharge, now unable to bear weight. No fever or chills. No falls or trauma    right hip infection  - Blood cultures times 2  - daptomycin 6 mg/kg q day  - Consult Dr. Vickers, ( ortho)  as family is now willing to consider surgery to improve his quality of life   - ID consult appreciated  - CT of right hip ordered  - ID following    aravind lower ext edema  - check doppler  - iv lasix    anemia  - iron studies c/w iron def  - FOBT  - iv iron    afib , AVR  - c/w metoprolol  - c/w eliquis    CAD  - c/w plavix    COPD  - c/w inhalers    BPH  - flomax and finasteride    HLD  - c/w lipitor    dvt px  - on eliquis                  89M with history of HTN, HLD, COPD (on intermittent 2L NC), CAD (no stents), s/p bioprosthetic AVR, CVA in 10/2022, s/p R total hip replacement ~30 years ago, afib/aflutter, hypothyroid, CKD, bladder cancer, and cholangiocarcinoma (s/p treatment, stable) presenting from home with worsening R hip pain. Accompanied by granddaughter at bedside. Patient was found to have UTI in 9/2023 and was treated for 1 month. In 10/2023, patient was found to be bacteremic with septic joint on R hip, was treated with 6 week course of IV antibiotics without surgical debridement or operation given age and other comorbidities. Patient was discharged from [place] to [facility], was able to work with PT appropriately. He finished last dose of IV antibiotics and was discharged home on Tuesday. He was seen by Dr. Redding in clinic yesterday and was being planned for IV antibiotics but has not started yet. He started having significant pain on the R hip again since discharge, now unable to bear weight. No fever or chills. No falls or trauma    right hip infection  - Blood cultures times 2  - daptomycin 6 mg/kg q day  - Consult Dr. Vickers, ( ortho)  as family is now willing to consider surgery to improve his quality of life   - ID consult appreciated  - CT of right hip ordered  - ID following    aravind lower ext edema  - check doppler  - iv lasix    anemia  - iron studies c/w iron def  - FOBT  - iv iron    afib , AVR  - c/w metoprolol  - c/w eliquis    CAD  - c/w plavix    COPD  - c/w inhalers    BPH  - flomax and finasteride    HLD  - c/w lipitor    dvt px  - on eliquis

## 2023-12-08 NOTE — PROGRESS NOTE ADULT - ASSESSMENT
89 year old with multiple medical problems including    AVR  CVA  CAD  Bladder cancer  Cholangiocarcinoma  Rp hip replacement many yrs ago    pt had a rt THR infection about 10 years ago that was treated with debridement and IV ab and improved     Recently presented to  with pain and was found to have BC and Hip aspirate culture positive S. epidermidis   pt was not treated surgically  He received a 6 week course of daptomycin that was completed on Monday and was discharged from [facility]  I saw him in office and was going to  put him on suppressive po minocycline which the S epi was sensitive   Prior to getting the minocycline, he developed pain in the right hip and represented to the  ER    no fever or chills  no malaise but pain and inability to walk with out more pain    The patients hip pain is almost certainly from ongoing infection of the THR  Due to his age and comorbidities surgery was deferred and a MARGARETTE was not done   pain isbetter after 24 hr of ab    Blood cultures times 2  daptomycin 6 mg/kg q day   await BC and ct scan of hip  will switch to po minocycline when ready for discharge if surgery if not going to be done        89 year old with multiple medical problems including    AVR  CVA  CAD  Bladder cancer  Cholangiocarcinoma  Rp hip replacement many yrs ago    pt had a rt THR infection about 10 years ago that was treated with debridement and IV ab and improved     Recently presented to  with pain and was found to have BC and Hip aspirate culture positive S. epidermidis   pt was not treated surgically  He received a 6 week course of daptomycin that was completed on Monday and was discharged from [facility]  I saw him in office and was going to  put him on suppressive po minocycline which the S epi was sensitive   Prior to getting the minocycline, he developed pain in the right hip and represented to the  ER    no fever or chills  no malaise but pain and inability to walk with out more pain    The patients hip pain is almost certainly from ongoing infection of the THR  Due to his age and comorbidities surgery was deferred and a MARGARETTE was not done   pain isbetter after 24 hr of ab    Blood cultures times 2  daptomycin 6 mg/kg q day   await BC and ct scan of hip  will switch to po minocycline when ready for discharge if surgery if not going to be done

## 2023-12-08 NOTE — PHYSICAL THERAPY INITIAL EVALUATION ADULT - ADDITIONAL COMMENTS
Pt resides alone in a private home with 3 steps to enter. Pt recently at [facility], home for 1 day then brought to ED. Pt reports independent with functional mobility/ADLs prior to [facility]. Pt able to ambulate with RW at rehab. Owns cane at home. Pt resides alone in a private home with 3 steps to enter. Pt recently at [facility], home for 1 day then brought to ED. Pt reports independent with functional mobility/ADLs prior to [facility]. Pt able to ambulate with RW at rehab. Owns cane at home.

## 2023-12-08 NOTE — PROGRESS NOTE ADULT - ASSESSMENT
Assessment:  89M complex medical history. Has a R DARRYL from 30 years ago s/p I&D and PE exchagne 10 years ago at OSH. 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 Just stopped abx and pain worsened. Cannot bear weight.     Plan:  - WBAT RLE  - f/u CT R hip w/wo con (metal artifact reduction) to evaluate for abscess cavity-- if abscess found, IR consult for possible drainage  - f/u BCx, ESR/CRP  - f/u ID consult ([place])  - may need u/s to eval valve for vegetations  - ABX per ID  - DVT PPX per primary      For all questions related to patient care, please reach out to the on-call team via the pager.     Talia Marcos, PGY 2  Orthopaedic Surgery  [place] [id number]  [facility] [place] [phone]  [facility] [phone]   Assessment:  89M complex medical history. Has a R DARRYL from 30 years ago s/p I&D and PE exchagne 10 years ago at OSH. 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 Just stopped abx and pain worsened. Cannot bear weight.     Plan:  - WBAT RLE  - f/u CT R hip w/wo con (metal artifact reduction) to evaluate for abscess cavity-- if abscess found, IR consult for possible drainage  - f/u BCx, ESR/CRP  - f/u ID consult ([place])  - may need u/s to eval valve for vegetations  - ABX per ID  - DVT PPX per primary      For all questions related to patient care, please reach out to the on-call team via the pager.     Talia Marcos, PGY 2  Orthopaedic Surgery  [place] [phone]  [facility] [place] [phone]  [facility] [phone]

## 2023-12-08 NOTE — PROGRESS NOTE ADULT - SUBJECTIVE AND OBJECTIVE BOX
DATE OF SERVICE: 12-08-23 @ 11:31    Patient is a 89y old  Male who presents with a chief complaint of hip pain (08 Dec 2023 09:11)      SUBJECTIVE / OVERNIGHT EVENTS:  No chest pain. No shortness of breath. No complaints. No events overnight.     MEDICATIONS  (STANDING):  apixaban 5 milliGRAM(s) Oral two times a day  atorvastatin 40 milliGRAM(s) Oral at bedtime  clopidogrel Tablet 75 milliGRAM(s) Oral daily  DAPTOmycin IVPB 400 milliGRAM(s) IV Intermittent every 24 hours  finasteride 5 milliGRAM(s) Oral daily  furosemide   Injectable 20 milliGRAM(s) IV Push daily  levothyroxine 75 MICROGram(s) Oral daily  metoprolol succinate  milliGRAM(s) Oral two times a day  potassium chloride    Tablet ER 10 milliEquivalent(s) Oral daily  senna 2 Tablet(s) Oral at bedtime  tamsulosin 0.4 milliGRAM(s) Oral at bedtime  tiotropium 2.5 MICROgram(s) Inhaler 2 Puff(s) Inhalation daily    MEDICATIONS  (PRN):      Vital Signs Last 24 Hrs  T(C): 36.6 (08 Dec 2023 09:17), Max: 36.8 (07 Dec 2023 12:48)  T(F): 97.9 (08 Dec 2023 09:17), Max: 98.2 (07 Dec 2023 12:48)  HR: 85 (08 Dec 2023 09:17) (85 - 90)  BP: 110/67 (08 Dec 2023 09:18) (106/57 - 125/75)  BP(mean): 75 (07 Dec 2023 18:24) (75 - 87)  RR: 18 (08 Dec 2023 09:17) (13 - 18)  SpO2: 97% (08 Dec 2023 09:17) (95% - 100%)    Parameters below as of 08 Dec 2023 09:17  Patient On (Oxygen Delivery Method): nasal cannula  O2 Flow (L/min): 2    CAPILLARY BLOOD GLUCOSE        I&O's Summary    07 Dec 2023 07:01  -  08 Dec 2023 07:00  --------------------------------------------------------  IN: 0 mL / OUT: 1150 mL / NET: -1150 mL        PHYSICAL EXAM:  GENERAL: NAD, well-developed  HEAD:  Atraumatic, Normocephalic  EYES: EOMI, PERRLA, conjunctiva and sclera clear  NECK: Supple, No JVD  CHEST/LUNG: Clear to auscultation bilaterally; No wheeze  HEART: Regular rate and rhythm; No murmurs, rubs, or gallops  ABDOMEN: Soft, Nontender, Nondistended; Bowel sounds present  EXTREMITIES:  2+ Peripheral Pulses, No clubbing, cyanosis, or edema  PSYCH: AAOx3  NEUROLOGY: non-focal  SKIN: No rashes or lesions    LABS:                        7.1    9.56  )-----------( 174      ( 08 Dec 2023 07:21 )             22.6     12-08    140  |  107  |  37<H>  ----------------------------<  102<H>  3.8   |  22  |  1.06    Ca    8.8      08 Dec 2023 07:20  Phos  3.6     12-08  Mg     2.3     12-08    TPro  7.3  /  Alb  3.2<L>  /  TBili  0.6  /  DBili  x   /  AST  54<H>  /  ALT  52<H>  /  AlkPhos  249<H>  12-07    PT/INR - ( 07 Dec 2023 11:10 )   PT: 20.6 sec;   INR: 1.91 ratio         PTT - ( 07 Dec 2023 11:10 )  PTT:28.7 sec      Urinalysis Basic - ( 08 Dec 2023 07:20 )    Color: x / Appearance: x / SG: x / pH: x  Gluc: 102 mg/dL / Ketone: x  / Bili: x / Urobili: x   Blood: x / Protein: x / Nitrite: x   Leuk Esterase: x / RBC: x / WBC x   Sq Epi: x / Non Sq Epi: x / Bacteria: x        RADIOLOGY & ADDITIONAL TESTS:    Imaging Personally Reviewed:    Consultant(s) Notes Reviewed:      Care Discussed with Consultants/Other Providers:

## 2023-12-08 NOTE — PROGRESS NOTE ADULT - SUBJECTIVE AND OBJECTIVE BOX
infectious diseases progress note:    Patient is a 89y old  Male who presents with a chief complaint of pain (07 Dec 2023 12:05)        Pain of right hip               Allergies    penicillins (Other)    Intolerances        ANTIBIOTICS/RELEVANT:  antimicrobials  DAPTOmycin IVPB 400 milliGRAM(s) IV Intermittent every 24 hours    immunologic:    OTHER:  apixaban 5 milliGRAM(s) Oral two times a day  atorvastatin 40 milliGRAM(s) Oral at bedtime  clopidogrel Tablet 75 milliGRAM(s) Oral daily  finasteride 5 milliGRAM(s) Oral daily  furosemide   Injectable 20 milliGRAM(s) IV Push daily  levothyroxine 75 MICROGram(s) Oral daily  metoprolol succinate  milliGRAM(s) Oral two times a day  potassium chloride    Tablet ER 10 milliEquivalent(s) Oral daily  senna 2 Tablet(s) Oral at bedtime  tamsulosin 0.4 milliGRAM(s) Oral at bedtime  tiotropium 2.5 MICROgram(s) Inhaler 2 Puff(s) Inhalation daily      Objective:  Vital Signs Last 24 Hrs  T(C): 36.6 (08 Dec 2023 04:19), Max: 36.8 (07 Dec 2023 11:00)  T(F): 97.9 (08 Dec 2023 04:19), Max: 98.3 (07 Dec 2023 11:00)  HR: 90 (08 Dec 2023 04:19) (84 - 90)  BP: 118/65 (08 Dec 2023 04:19) (113/62 - 134/74)  BP(mean): 75 (07 Dec 2023 18:24) (75 - 87)  RR: 18 (08 Dec 2023 04:19) (13 - 20)  SpO2: 95% (08 Dec 2023 04:19) (95% - 100%)    Parameters below as of 08 Dec 2023 04:19  Patient On (Oxygen Delivery Method): nasal cannula  O2 Flow (L/min): 2      PHYSICAL EXAM:     Eyes:BELINDA, EOMI  Ear/Nose/Throat: no oral lesion, no sinus tenderness on percussion	  Neck:no JVD, no lymphadenopathy, supple  Respiratory: CTA aravind  Cardiovascular: S1S2 RRR, no murmurs  Gastrointestinal:soft, (+) BS, no HSM  Extremities:no e/e/c        LABS:                        7.1    9.56  )-----------( 174      ( 08 Dec 2023 07:21 )             22.6     12-08    140  |  107  |  37<H>  ----------------------------<  102<H>  3.8   |  22  |  1.06    Ca    8.8      08 Dec 2023 07:20  Phos  3.6     12-08  Mg     2.3     12-08    TPro  7.3  /  Alb  3.2<L>  /  TBili  0.6  /  DBili  x   /  AST  54<H>  /  ALT  52<H>  /  AlkPhos  249<H>  12-07    PT/INR - ( 07 Dec 2023 11:10 )   PT: 20.6 sec;   INR: 1.91 ratio         PTT - ( 07 Dec 2023 11:10 )  PTT:28.7 sec  Urinalysis Basic - ( 08 Dec 2023 07:20 )    Color: x / Appearance: x / SG: x / pH: x  Gluc: 102 mg/dL / Ketone: x  / Bili: x / Urobili: x   Blood: x / Protein: x / Nitrite: x   Leuk Esterase: x / RBC: x / WBC x   Sq Epi: x / Non Sq Epi: x / Bacteria: x          MICROBIOLOGY:    RECENT CULTURES:        RESPIRATORY CULTURES:              RADIOLOGY & ADDITIONAL STUDIES:        Pager [phone]  After 5 pm/weekends or if no response :[phone] infectious diseases progress note:    Patient is a 89y old  Male who presents with a chief complaint of pain (07 Dec 2023 12:05)        Pain of right hip               Allergies    penicillins (Other)    Intolerances        ANTIBIOTICS/RELEVANT:  antimicrobials  DAPTOmycin IVPB 400 milliGRAM(s) IV Intermittent every 24 hours    immunologic:    OTHER:  apixaban 5 milliGRAM(s) Oral two times a day  atorvastatin 40 milliGRAM(s) Oral at bedtime  clopidogrel Tablet 75 milliGRAM(s) Oral daily  finasteride 5 milliGRAM(s) Oral daily  furosemide   Injectable 20 milliGRAM(s) IV Push daily  levothyroxine 75 MICROGram(s) Oral daily  metoprolol succinate  milliGRAM(s) Oral two times a day  potassium chloride    Tablet ER 10 milliEquivalent(s) Oral daily  senna 2 Tablet(s) Oral at bedtime  tamsulosin 0.4 milliGRAM(s) Oral at bedtime  tiotropium 2.5 MICROgram(s) Inhaler 2 Puff(s) Inhalation daily      Objective:  Vital Signs Last 24 Hrs  T(C): 36.6 (08 Dec 2023 04:19), Max: 36.8 (07 Dec 2023 11:00)  T(F): 97.9 (08 Dec 2023 04:19), Max: 98.3 (07 Dec 2023 11:00)  HR: 90 (08 Dec 2023 04:19) (84 - 90)  BP: 118/65 (08 Dec 2023 04:19) (113/62 - 134/74)  BP(mean): 75 (07 Dec 2023 18:24) (75 - 87)  RR: 18 (08 Dec 2023 04:19) (13 - 20)  SpO2: 95% (08 Dec 2023 04:19) (95% - 100%)    Parameters below as of 08 Dec 2023 04:19  Patient On (Oxygen Delivery Method): nasal cannula  O2 Flow (L/min): 2      PHYSICAL EXAM:     Eyes:BELINDA, EOMI  Ear/Nose/Throat: no oral lesion, no sinus tenderness on percussion	  Neck:no JVD, no lymphadenopathy, supple  Respiratory: CTA aravind  Cardiovascular: S1S2 RRR, no murmurs  Gastrointestinal:soft, (+) BS, no HSM  Extremities:no e/e/c        LABS:                        7.1    9.56  )-----------( 174      ( 08 Dec 2023 07:21 )             22.6     12-08    140  |  107  |  37<H>  ----------------------------<  102<H>  3.8   |  22  |  1.06    Ca    8.8      08 Dec 2023 07:20  Phos  3.6     12-08  Mg     2.3     12-08    TPro  7.3  /  Alb  3.2<L>  /  TBili  0.6  /  DBili  x   /  AST  54<H>  /  ALT  52<H>  /  AlkPhos  249<H>  12-07    PT/INR - ( 07 Dec 2023 11:10 )   PT: 20.6 sec;   INR: 1.91 ratio         PTT - ( 07 Dec 2023 11:10 )  PTT:28.7 sec  Urinalysis Basic - ( 08 Dec 2023 07:20 )    Color: x / Appearance: x / SG: x / pH: x  Gluc: 102 mg/dL / Ketone: x  / Bili: x / Urobili: x   Blood: x / Protein: x / Nitrite: x   Leuk Esterase: x / RBC: x / WBC x   Sq Epi: x / Non Sq Epi: x / Bacteria: x          MICROBIOLOGY:    RECENT CULTURES:        RESPIRATORY CULTURES:              RADIOLOGY & ADDITIONAL STUDIES:        Pager [phone]  After 5 pm/weekends or if no response :[phone]

## 2023-12-08 NOTE — PHYSICAL THERAPY INITIAL EVALUATION ADULT - PERTINENT HX OF CURRENT PROBLEM, REHAB EVAL
89M with history of HTN, HLD, COPD (on intermittent 2L NC), CAD (no stents), s/p bioprosthetic AVR, CVA in 10/2022, s/p R total hip replacement ~30 years ago, afib/aflutter, hypothyroid, CKD, bladder cancer, and cholangiocarcinoma (s/p treatment, stable) presenting from home with worsening R hip pain. Accompanied by granddaughter at bedside. Patient was found to have UTI in 9/2023 and was treated for 1 month. In 10/2023, patient was found to be bacteremic with septic joint on R hip, was treated with 6 week course of IV antibiotics without surgical debridement or operation given age and other comorbidities. Patient was discharged from [place] to [facility], was able to work with PT appropriately. He finished last dose of IV antibiotics and was discharged home on Tuesday. He was seen by Dr. Redding in clinic yesterday (12/6)  and was being planned for IV antibiotics but has not started yet. He started having significant pain on the R hip again since discharge, now unable to bear weight. No fever or chills. No falls or trauma. Hosp course: XR Hip (12/7) There is a right total hip arthroplasty without evidence for hardware complication including no imaging evidence for periprosthetic infection. XR pelvis (12/7) Right total hip arthroplasty without hardware complication. XR femur (12/7) Right total hip arthroplasty without hardware complication. 89M with history of HTN, HLD, COPD (on intermittent 2L NC), CAD (no stents), s/p bioprosthetic AVR, CVA in 10/2022, s/p R total hip replacement ~30 years ago, afib/aflutter, hypothyroid, CKD, bladder cancer, and cholangiocarcinoma (s/p treatment, stable) presenting from home with worsening R hip pain. Accompanied by granddaughter at bedside. Patient was found to have UTI in 9/2023 and was treated for 1 month. In 10/2023, patient was found to be bacteremic with septic joint on R hip, was treated with 6 week course of IV antibiotics without surgical debridement or operation given age and other comorbidities. Patient was discharged from [place] to [facility], was able to work with PT appropriately. He finished last dose of IV antibiotics and was discharged home on Tuesday. He was seen by Dr. Redding in clinic yesterday (12/6)  and was being planned for IV antibiotics but has not started yet. He started having significant pain on the R hip again since discharge, now unable to bear weight. No fever or chills. No falls or trauma. Hosp course: XR Hip (12/7) There is a right total hip arthroplasty without evidence for hardware complication including no imaging evidence for periprosthetic infection. XR pelvis (12/7) Right total hip arthroplasty without hardware complication. XR femur (12/7) Right total hip arthroplasty without hardware complication.

## 2023-12-08 NOTE — CHART NOTE - NSCHARTNOTEFT_GEN_A_CORE
Patient is a 89y old  Male who presents with a chief complaint of hip pain. RN reports patient with shortness of breath, now wearing oxygen vis NC. He currently has decreased BS to BL lungs. He is also noted with BLE erythema.       Vital Signs Last 24 Hrs  T(C): 36.6 (08 Dec 2023 09:17), Max: 36.8 (07 Dec 2023 12:48)  T(F): 97.9 (08 Dec 2023 09:17), Max: 98.2 (07 Dec 2023 12:48)  HR: 85 (08 Dec 2023 09:17) (85 - 90)  BP: 110/67 (08 Dec 2023 09:18) (106/57 - 125/75)  BP(mean): 75 (07 Dec 2023 18:24) (75 - 87)  RR: 18 (08 Dec 2023 09:17) (13 - 18)  SpO2: 97% (08 Dec 2023 09:17) (95% - 100%)    Parameters below as of 08 Dec 2023 09:17  Patient On (Oxygen Delivery Method): nasal cannula  O2 Flow (L/min): 2      Physical Exam:  General: alert, NAD  Neurology: A&Ox3, nonfocal  Head:  Normocephalic, atraumatic  Respiratory: Decreased BS  CV: RRR, S1S2, no murmur  Abdominal: Soft, NT, ND no palpable mass  MSK: No edema, +erythema    Labs:                          7.1    9.56  )-----------( 174      ( 08 Dec 2023 07:21 )             22.6     12-08    140  |  107  |  37<H>  ----------------------------<  102<H>  3.8   |  22  |  1.06    Ca    8.8      08 Dec 2023 07:20  Phos  3.6     12-08  Mg     2.3     12-08    TPro  7.3  /  Alb  3.2<L>  /  TBili  0.6  /  DBili  x   /  AST  54<H>  /  ALT  52<H>  /  AlkPhos  249<H>  12-07            Radiology:    HPI:   89M with history of HTN, HLD, COPD (on intermittent 2L NC), CAD (no stents), s/p bioprosthetic AVR, CVA in 10/2022, s/p R total hip replacement ~30 years ago, afib/aflutter, hypothyroid, CKD, bladder cancer, and cholangiocarcinoma (s/p treatment, stable) presenting from home with worsening R hip pain. Accompanied by granddaughter at bedside. Patient was found to have UTI in 9/2023 and was treated for 1 month. In 10/2023, patient was found to be bacteremic with septic joint on R hip, was treated with 6 week course of IV antibiotics without surgical debridement or operation given age and other comorbidities. Patient was discharged from [place] to [facility], was able to work with PT appropriately. He finished last dose of IV antibiotics and was discharged home on Tuesday. He was seen by Dr. Redding in clinic yesterday and was being planned for IV antibiotics but has not started yet. He started having significant pain on the R hip again since discharge, now unable to bear weight. No fever or chills. No falls or trauma.    He is now presenting with shortness of breath, now wearing oxygen vis NC. He currently has decreased BS to BL lungs. He is also noted with BLE erythema.     Assessment & Plan:    Dyspnea:  - CXR  - Start Duonebs  >  >  >  >  Pauline Singh NP-BC Patient is a 89y old  Male who presents with a chief complaint of hip pain. RN reports patient with shortness of breath, now wearing oxygen vis NC. He currently has decreased BS to BL lungs. He is also noted with BLE erythema.       Vital Signs Last 24 Hrs  T(C): 36.6 (08 Dec 2023 09:17), Max: 36.8 (07 Dec 2023 12:48)  T(F): 97.9 (08 Dec 2023 09:17), Max: 98.2 (07 Dec 2023 12:48)  HR: 85 (08 Dec 2023 09:17) (85 - 90)  BP: 110/67 (08 Dec 2023 09:18) (106/57 - 125/75)  BP(mean): 75 (07 Dec 2023 18:24) (75 - 87)  RR: 18 (08 Dec 2023 09:17) (13 - 18)  SpO2: 97% (08 Dec 2023 09:17) (95% - 100%)    Parameters below as of 08 Dec 2023 09:17  Patient On (Oxygen Delivery Method): nasal cannula  O2 Flow (L/min): 2      Physical Exam:  General: alert, NAD  Neurology: A&Ox3, nonfocal  Head:  Normocephalic, atraumatic  Respiratory: Decreased BS  CV: RRR, S1S2, no murmur  Abdominal: Soft, NT, ND no palpable mass  MSK: No edema, +erythema    Labs:                          7.1    9.56  )-----------( 174      ( 08 Dec 2023 07:21 )             22.6     12-08    140  |  107  |  37<H>  ----------------------------<  102<H>  3.8   |  22  |  1.06    Ca    8.8      08 Dec 2023 07:20  Phos  3.6     12-08  Mg     2.3     12-08    TPro  7.3  /  Alb  3.2<L>  /  TBili  0.6  /  DBili  x   /  AST  54<H>  /  ALT  52<H>  /  AlkPhos  249<H>  12-07            Radiology:    HPI:   89M with history of HTN, HLD, COPD (on intermittent 2L NC), CAD (no stents), s/p bioprosthetic AVR, CVA in 10/2022, s/p R total hip replacement ~30 years ago, afib/aflutter, hypothyroid, CKD, bladder cancer, and cholangiocarcinoma (s/p treatment, stable) presenting from home with worsening R hip pain. Accompanied by granddaughter at bedside. Patient was found to have UTI in 9/2023 and was treated for 1 month. In 10/2023, patient was found to be bacteremic with septic joint on R hip, was treated with 6 week course of IV antibiotics without surgical debridement or operation given age and other comorbidities. Patient was discharged from [place] to [facility], was able to work with PT appropriately. He finished last dose of IV antibiotics and was discharged home on Tuesday. He was seen by Dr. Redding in clinic yesterday and was being planned for IV antibiotics but has not started yet. He started having significant pain on the R hip again since discharge, now unable to bear weight. No fever or chills. No falls or trauma.    He is now presenting with shortness of breath, now wearing oxygen vis NC. He currently has decreased BS to BL lungs. He is also noted with BLE erythema.     Assessment & Plan:    Dyspnea:  - CXR  - Start Duonebs  >  >  >  >  Pauline Singh NP-BC Patient is a 89y old  Male who presents with a chief complaint of hip pain. RN reports patient with shortness of breath, now wearing oxygen vis NC. He currently has decreased BS to BL lungs. He is also noted with BLE erythema.       Vital Signs Last 24 Hrs  T(C): 36.6 (08 Dec 2023 09:17), Max: 36.8 (07 Dec 2023 12:48)  T(F): 97.9 (08 Dec 2023 09:17), Max: 98.2 (07 Dec 2023 12:48)  HR: 85 (08 Dec 2023 09:17) (85 - 90)  BP: 110/67 (08 Dec 2023 09:18) (106/57 - 125/75)  BP(mean): 75 (07 Dec 2023 18:24) (75 - 87)  RR: 18 (08 Dec 2023 09:17) (13 - 18)  SpO2: 97% (08 Dec 2023 09:17) (95% - 100%)    Parameters below as of 08 Dec 2023 09:17  Patient On (Oxygen Delivery Method): nasal cannula  O2 Flow (L/min): 2      Physical Exam:  General: alert, NAD  Neurology: A&Ox3, nonfocal  Head:  Normocephalic, atraumatic  Respiratory: Decreased BS  CV: RRR, S1S2, no murmur  Abdominal: Soft, NT, ND no palpable mass  MSK: No edema, +erythema    Labs:                          7.1    9.56  )-----------( 174      ( 08 Dec 2023 07:21 )             22.6     12-08    140  |  107  |  37<H>  ----------------------------<  102<H>  3.8   |  22  |  1.06    Ca    8.8      08 Dec 2023 07:20  Phos  3.6     12-08  Mg     2.3     12-08    TPro  7.3  /  Alb  3.2<L>  /  TBili  0.6  /  DBili  x   /  AST  54<H>  /  ALT  52<H>  /  AlkPhos  249<H>  12-07            Radiology:    HPI:   89M with history of HTN, HLD, COPD (on intermittent 2L NC), CAD (no stents), s/p bioprosthetic AVR, CVA in 10/2022, s/p R total hip replacement ~30 years ago, afib/aflutter, hypothyroid, CKD, bladder cancer, and cholangiocarcinoma (s/p treatment, stable) presenting from home with worsening R hip pain. Accompanied by granddaughter at bedside. Patient was found to have UTI in 9/2023 and was treated for 1 month. In 10/2023, patient was found to be bacteremic with septic joint on R hip, was treated with 6 week course of IV antibiotics without surgical debridement or operation given age and other comorbidities. Patient was discharged from [place] to [facility], was able to work with PT appropriately. He finished last dose of IV antibiotics and was discharged home on Tuesday. He was seen by Dr. Redding in clinic yesterday and was being planned for IV antibiotics but has not started yet. He started having significant pain on the R hip again since discharge, now unable to bear weight. No fever or chills. No falls or trauma.    He is now presenting with shortness of breath, now wearing oxygen vis NC. He currently has decreased BS to BL lungs. He is also noted with BLE erythema.     Assessment & Plan:    Dyspnea:  - CXR  - Start Duonebs  - BLE Dopplers ordered  - Lasix IV ordered by MD  >  >    >  Pauline Singh NP-BC Patient is a 89y old  Male who presents with a chief complaint of hip pain. RN reports patient with shortness of breath, now wearing oxygen vis NC. He currently has decreased BS to BL lungs. He is also noted with BLE erythema.       Vital Signs Last 24 Hrs  T(C): 36.6 (08 Dec 2023 09:17), Max: 36.8 (07 Dec 2023 12:48)  T(F): 97.9 (08 Dec 2023 09:17), Max: 98.2 (07 Dec 2023 12:48)  HR: 85 (08 Dec 2023 09:17) (85 - 90)  BP: 110/67 (08 Dec 2023 09:18) (106/57 - 125/75)  BP(mean): 75 (07 Dec 2023 18:24) (75 - 87)  RR: 18 (08 Dec 2023 09:17) (13 - 18)  SpO2: 97% (08 Dec 2023 09:17) (95% - 100%)    Parameters below as of 08 Dec 2023 09:17  Patient On (Oxygen Delivery Method): nasal cannula  O2 Flow (L/min): 2      Physical Exam:  General: alert, NAD  Neurology: A&Ox3, nonfocal  Head:  Normocephalic, atraumatic  Respiratory: Decreased BS  CV: RRR, S1S2, no murmur  Abdominal: Soft, NT, ND no palpable mass  MSK: No edema, +erythema    Labs:                          7.1    9.56  )-----------( 174      ( 08 Dec 2023 07:21 )             22.6     12-08    140  |  107  |  37<H>  ----------------------------<  102<H>  3.8   |  22  |  1.06    Ca    8.8      08 Dec 2023 07:20  Phos  3.6     12-08  Mg     2.3     12-08    TPro  7.3  /  Alb  3.2<L>  /  TBili  0.6  /  DBili  x   /  AST  54<H>  /  ALT  52<H>  /  AlkPhos  249<H>  12-07            Radiology:    HPI:   89M with history of HTN, HLD, COPD (on intermittent 2L NC), CAD (no stents), s/p bioprosthetic AVR, CVA in 10/2022, s/p R total hip replacement ~30 years ago, afib/aflutter, hypothyroid, CKD, bladder cancer, and cholangiocarcinoma (s/p treatment, stable) presenting from home with worsening R hip pain. Accompanied by granddaughter at bedside. Patient was found to have UTI in 9/2023 and was treated for 1 month. In 10/2023, patient was found to be bacteremic with septic joint on R hip, was treated with 6 week course of IV antibiotics without surgical debridement or operation given age and other comorbidities. Patient was discharged from [place] to [facility], was able to work with PT appropriately. He finished last dose of IV antibiotics and was discharged home on Tuesday. He was seen by Dr. Redding in clinic yesterday and was being planned for IV antibiotics but has not started yet. He started having significant pain on the R hip again since discharge, now unable to bear weight. No fever or chills. No falls or trauma.    He is now presenting with shortness of breath, now wearing oxygen vis NC. He currently has decreased BS to BL lungs. He is also noted with BLE erythema.     Assessment & Plan:    Dyspnea:  - CXR  - Start Duonebs  - BLE Dopplers ordered  - Lasix IV ordered by MD  >  >    >  Pauline Singh NP-BC

## 2023-12-08 NOTE — PROGRESS NOTE ADULT - SUBJECTIVE AND OBJECTIVE BOX
ORTHOPEDIC PROGRESS NOTE    Overnight events: None    SUBJECTIVE: Pt seen and examined at bedside. Patient is doing well, no acute complaints this AM. Pain is controlled with medication      OBJECTIVE:  Vital Signs Last 24 Hrs  T(C): 36.6 (08 Dec 2023 09:17), Max: 36.8 (07 Dec 2023 12:48)  T(F): 97.9 (08 Dec 2023 09:17), Max: 98.2 (07 Dec 2023 12:48)  HR: 85 (08 Dec 2023 09:17) (85 - 90)  BP: 110/67 (08 Dec 2023 09:18) (106/57 - 125/75)  BP(mean): 75 (07 Dec 2023 18:24) (75 - 87)  RR: 18 (08 Dec 2023 09:17) (13 - 18)  SpO2: 97% (08 Dec 2023 09:17) (95% - 100%)    Parameters below as of 08 Dec 2023 09:17  Patient On (Oxygen Delivery Method): nasal cannula  O2 Flow (L/min): 2        12-07-23 @ 07:01  -  12-08-23 @ 07:00  --------------------------------------------------------  IN: 0 mL / OUT: 1150 mL / NET: -1150 mL    12-08-23 @ 07:01  -  12-08-23 @ 12:04  --------------------------------------------------------  IN: 240 mL / OUT: 1 mL / NET: 239 mL        Physical Examination:  GEN: NAD, resting quietly  PULM: symmetric chest rise bilaterally, no increased WOB  ABD: nondistended  EXTR:   L hip   Skin intact, incision well healed, TTP over the L hip  FROM of the L hip w pain at 90, minimal pain with internal/external rotation  Able to SLR, neg log roll/axial  SILT, NVI      LABS:                        7.4    10.62 )-----------( 184      ( 08 Dec 2023 11:14 )             24.0       12-08    140  |  107  |  37<H>  ----------------------------<  102<H>  3.8   |  22  |  1.06    Ca    8.8      08 Dec 2023 07:20  Phos  3.6     12-08  Mg     2.3     12-08    TPro  7.3  /  Alb  3.2<L>  /  TBili  0.6  /  DBili  x   /  AST  54<H>  /  ALT  52<H>  /  AlkPhos  249<H>  12-07

## 2023-12-08 NOTE — PROGRESS NOTE ADULT - ATTENDING COMMENTS
Pain improving in R hip  still not oob, no pt yet    AFVSS  GEN: NAD, resting quietly  PULM: symmetric chest rise bilaterally, no increased WOB  ABD: nondistended  EXTR:   L hip   Skin intact, incision well healed, TTP over the L hip  FROM of the L hip w pain at 90, minimal pain with internal/external rotation  Able to SLR, neg log roll/axial  SILT, NVI    Assessment:  89M complex medical history. Has a R DARRYL from 30 years ago s/p I&D and PE exchagne 10 years ago at OSH. 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 Just stopped abx and pain worsened. Cannot bear weight likely from flare of infection. Again had extensive discussion with patient/family that there is 0 chance to clear the infection with antibiotics alone. This can be used to suppress the infection only. Surgery for an I&D and poly swap would have about a 20% chance of success. Removal of the implant is quite complex and can result in the femur breaking in an unreconstructable manner. He may not be able to have an implant reimplanted later. For now they opt for nonoperative management but we will follow along.    Plan:  - WBAT RLE  - f/u CT R hip w/wo con (metal artifact reduction) to evaluate for abscess cavity-- if abscess found, IR consult for possible drainage  - f/u BCx, ESR/CRP  - f/u ID consult ([place])  - may need u/s to eval valve for vegetations  - ABX per ID  - DVT PPX per primary    Gene Vickers M.D.  Attending Orthopedic Surgeon Pain improving in R hip  still not oob, no pt yet    AFVSS  GEN: NAD, resting quietly  PULM: symmetric chest rise bilaterally, no increased WOB  ABD: nondistended  EXTR:   L hip   Skin intact, incision well healed, TTP over the L hip  FROM of the L hip w pain at 90, minimal pain with internal/external rotation  Able to SLR, neg log roll/axial  SILT, NVI    Assessment:  89M complex medical history. Has a R DARRYL from 30 years ago s/p I&D and PE exchagne 10 years ago at OSH. 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 Just stopped abx and pain worsened. Cannot bear weight likely from flare of infection. Again had extensive discussion with patient/family that there is 0 chance to clear the infection with antibiotics alone. This can be used to suppress the infection only. Surgery for an I&D and poly swap would have about a 20% chance of success. Removal of the implant is quite complex and can result in the femur breaking in an unreconstructable manner. He may not be able to have an implant reimplanted later. For now they opt for nonoperative management but we will follow along.    Plan:  - WBAT RLE  - f/u CT R hip w/wo con (metal artifact reduction) to evaluate for abscess cavity-- if abscess found, IR consult for possible drainage  - f/u BCx, ESR/CRP  - f/u ID consult ([place])  - may need u/s to eval valve for vegetations  - ABX per ID  - DVT PPX per primary    Gene Vickers M.D.  Attending Orthopedic Surgeon

## 2023-12-09 LAB
ALBUMIN SERPL ELPH-MCNC: 2.8 G/DL — LOW (ref 3.3–5)
ALBUMIN SERPL ELPH-MCNC: 2.8 G/DL — LOW (ref 3.3–5)
ALP SERPL-CCNC: 182 U/L — HIGH (ref 40–120)
ALP SERPL-CCNC: 182 U/L — HIGH (ref 40–120)
ALT FLD-CCNC: 31 U/L — SIGNIFICANT CHANGE UP (ref 10–45)
ALT FLD-CCNC: 31 U/L — SIGNIFICANT CHANGE UP (ref 10–45)
ANION GAP SERPL CALC-SCNC: 10 MMOL/L — SIGNIFICANT CHANGE UP (ref 5–17)
ANION GAP SERPL CALC-SCNC: 10 MMOL/L — SIGNIFICANT CHANGE UP (ref 5–17)
AST SERPL-CCNC: 29 U/L — SIGNIFICANT CHANGE UP (ref 10–40)
AST SERPL-CCNC: 29 U/L — SIGNIFICANT CHANGE UP (ref 10–40)
BASE EXCESS BLDV CALC-SCNC: 0.9 MMOL/L — SIGNIFICANT CHANGE UP (ref -2–3)
BASE EXCESS BLDV CALC-SCNC: 0.9 MMOL/L — SIGNIFICANT CHANGE UP (ref -2–3)
BILIRUB SERPL-MCNC: 0.6 MG/DL — SIGNIFICANT CHANGE UP (ref 0.2–1.2)
BILIRUB SERPL-MCNC: 0.6 MG/DL — SIGNIFICANT CHANGE UP (ref 0.2–1.2)
BUN SERPL-MCNC: 34 MG/DL — HIGH (ref 7–23)
BUN SERPL-MCNC: 34 MG/DL — HIGH (ref 7–23)
CA-I SERPL-SCNC: 1.2 MMOL/L — SIGNIFICANT CHANGE UP (ref 1.15–1.33)
CA-I SERPL-SCNC: 1.2 MMOL/L — SIGNIFICANT CHANGE UP (ref 1.15–1.33)
CALCIUM SERPL-MCNC: 8.5 MG/DL — SIGNIFICANT CHANGE UP (ref 8.4–10.5)
CALCIUM SERPL-MCNC: 8.5 MG/DL — SIGNIFICANT CHANGE UP (ref 8.4–10.5)
CHLORIDE BLDV-SCNC: 105 MMOL/L — SIGNIFICANT CHANGE UP (ref 96–108)
CHLORIDE BLDV-SCNC: 105 MMOL/L — SIGNIFICANT CHANGE UP (ref 96–108)
CHLORIDE SERPL-SCNC: 104 MMOL/L — SIGNIFICANT CHANGE UP (ref 96–108)
CHLORIDE SERPL-SCNC: 104 MMOL/L — SIGNIFICANT CHANGE UP (ref 96–108)
CO2 BLDV-SCNC: 26 MMOL/L — SIGNIFICANT CHANGE UP (ref 22–26)
CO2 BLDV-SCNC: 26 MMOL/L — SIGNIFICANT CHANGE UP (ref 22–26)
CO2 SERPL-SCNC: 24 MMOL/L — SIGNIFICANT CHANGE UP (ref 22–31)
CO2 SERPL-SCNC: 24 MMOL/L — SIGNIFICANT CHANGE UP (ref 22–31)
CREAT SERPL-MCNC: 1.22 MG/DL — SIGNIFICANT CHANGE UP (ref 0.5–1.3)
CREAT SERPL-MCNC: 1.22 MG/DL — SIGNIFICANT CHANGE UP (ref 0.5–1.3)
CRP SERPL-MCNC: 84 MG/L — HIGH (ref 0–4)
CRP SERPL-MCNC: 84 MG/L — HIGH (ref 0–4)
EGFR: 55 ML/MIN/1.73M2 — LOW
EGFR: 55 ML/MIN/1.73M2 — LOW
ERYTHROCYTE [SEDIMENTATION RATE] IN BLOOD: 62 MM/HR — HIGH (ref 0–20)
GAS PNL BLDV: 134 MMOL/L — LOW (ref 136–145)
GAS PNL BLDV: 134 MMOL/L — LOW (ref 136–145)
GAS PNL BLDV: SIGNIFICANT CHANGE UP
GLUCOSE BLDV-MCNC: 119 MG/DL — HIGH (ref 70–99)
GLUCOSE BLDV-MCNC: 119 MG/DL — HIGH (ref 70–99)
GLUCOSE SERPL-MCNC: 105 MG/DL — HIGH (ref 70–99)
GLUCOSE SERPL-MCNC: 105 MG/DL — HIGH (ref 70–99)
HCO3 BLDV-SCNC: 25 MMOL/L — SIGNIFICANT CHANGE UP (ref 22–29)
HCO3 BLDV-SCNC: 25 MMOL/L — SIGNIFICANT CHANGE UP (ref 22–29)
HCT VFR BLD CALC: 23.6 % — LOW (ref 39–50)
HCT VFR BLD CALC: 23.6 % — LOW (ref 39–50)
HCT VFR BLDA CALC: 23 % — LOW (ref 39–51)
HCT VFR BLDA CALC: 23 % — LOW (ref 39–51)
HGB BLD CALC-MCNC: 7.5 G/DL — LOW (ref 12.6–17.4)
HGB BLD CALC-MCNC: 7.5 G/DL — LOW (ref 12.6–17.4)
HGB BLD-MCNC: 7.3 G/DL — LOW (ref 13–17)
HGB BLD-MCNC: 7.3 G/DL — LOW (ref 13–17)
IRON SATN MFR SERPL: 110 UG/DL — SIGNIFICANT CHANGE UP (ref 45–165)
IRON SATN MFR SERPL: 110 UG/DL — SIGNIFICANT CHANGE UP (ref 45–165)
IRON SATN MFR SERPL: 60 % — HIGH (ref 16–55)
IRON SATN MFR SERPL: 60 % — HIGH (ref 16–55)
LACTATE BLDV-MCNC: 1.3 MMOL/L — SIGNIFICANT CHANGE UP (ref 0.5–2)
LACTATE BLDV-MCNC: 1.3 MMOL/L — SIGNIFICANT CHANGE UP (ref 0.5–2)
MCHC RBC-ENTMCNC: 30.9 GM/DL — LOW (ref 32–36)
MCHC RBC-ENTMCNC: 30.9 GM/DL — LOW (ref 32–36)
MCHC RBC-ENTMCNC: 31.1 PG — SIGNIFICANT CHANGE UP (ref 27–34)
MCHC RBC-ENTMCNC: 31.1 PG — SIGNIFICANT CHANGE UP (ref 27–34)
MCV RBC AUTO: 100.4 FL — HIGH (ref 80–100)
MCV RBC AUTO: 100.4 FL — HIGH (ref 80–100)
NRBC # BLD: 0 /100 WBCS — SIGNIFICANT CHANGE UP (ref 0–0)
NRBC # BLD: 0 /100 WBCS — SIGNIFICANT CHANGE UP (ref 0–0)
OB PNL STL: NEGATIVE — SIGNIFICANT CHANGE UP
OB PNL STL: NEGATIVE — SIGNIFICANT CHANGE UP
PCO2 BLDV: 37 MMHG — LOW (ref 42–55)
PCO2 BLDV: 37 MMHG — LOW (ref 42–55)
PH BLDV: 7.44 — HIGH (ref 7.32–7.43)
PH BLDV: 7.44 — HIGH (ref 7.32–7.43)
PLATELET # BLD AUTO: 173 K/UL — SIGNIFICANT CHANGE UP (ref 150–400)
PLATELET # BLD AUTO: 173 K/UL — SIGNIFICANT CHANGE UP (ref 150–400)
PO2 BLDV: 55 MMHG — HIGH (ref 25–45)
PO2 BLDV: 55 MMHG — HIGH (ref 25–45)
POTASSIUM BLDV-SCNC: 3.8 MMOL/L — SIGNIFICANT CHANGE UP (ref 3.5–5.1)
POTASSIUM BLDV-SCNC: 3.8 MMOL/L — SIGNIFICANT CHANGE UP (ref 3.5–5.1)
POTASSIUM SERPL-MCNC: 3.6 MMOL/L — SIGNIFICANT CHANGE UP (ref 3.5–5.3)
POTASSIUM SERPL-MCNC: 3.6 MMOL/L — SIGNIFICANT CHANGE UP (ref 3.5–5.3)
POTASSIUM SERPL-SCNC: 3.6 MMOL/L — SIGNIFICANT CHANGE UP (ref 3.5–5.3)
POTASSIUM SERPL-SCNC: 3.6 MMOL/L — SIGNIFICANT CHANGE UP (ref 3.5–5.3)
PROT SERPL-MCNC: 6.1 G/DL — SIGNIFICANT CHANGE UP (ref 6–8.3)
PROT SERPL-MCNC: 6.1 G/DL — SIGNIFICANT CHANGE UP (ref 6–8.3)
RBC # BLD: 2.35 M/UL — LOW (ref 4.2–5.8)
RBC # BLD: 2.35 M/UL — LOW (ref 4.2–5.8)
RBC # FLD: 16.1 % — HIGH (ref 10.3–14.5)
RBC # FLD: 16.1 % — HIGH (ref 10.3–14.5)
SAO2 % BLDV: 85.4 % — SIGNIFICANT CHANGE UP (ref 67–88)
SAO2 % BLDV: 85.4 % — SIGNIFICANT CHANGE UP (ref 67–88)
SODIUM SERPL-SCNC: 138 MMOL/L — SIGNIFICANT CHANGE UP (ref 135–145)
SODIUM SERPL-SCNC: 138 MMOL/L — SIGNIFICANT CHANGE UP (ref 135–145)
TIBC SERPL-MCNC: 183 UG/DL — LOW (ref 220–430)
TIBC SERPL-MCNC: 183 UG/DL — LOW (ref 220–430)
UIBC SERPL-MCNC: 73 UG/DL — LOW (ref 110–370)
UIBC SERPL-MCNC: 73 UG/DL — LOW (ref 110–370)
WBC # BLD: 8.98 K/UL — SIGNIFICANT CHANGE UP (ref 3.8–10.5)
WBC # BLD: 8.98 K/UL — SIGNIFICANT CHANGE UP (ref 3.8–10.5)
WBC # FLD AUTO: 8.98 K/UL — SIGNIFICANT CHANGE UP (ref 3.8–10.5)
WBC # FLD AUTO: 8.98 K/UL — SIGNIFICANT CHANGE UP (ref 3.8–10.5)

## 2023-12-09 PROCEDURE — 99232 SBSQ HOSP IP/OBS MODERATE 35: CPT | Mod: GC

## 2023-12-09 PROCEDURE — 71045 X-RAY EXAM CHEST 1 VIEW: CPT | Mod: 26

## 2023-12-09 RX ORDER — FUROSEMIDE 40 MG
40 TABLET ORAL DAILY
Refills: 0 | Status: DISCONTINUED | OUTPATIENT
Start: 2023-12-10 | End: 2023-12-17

## 2023-12-09 RX ORDER — LANOLIN ALCOHOL/MO/W.PET/CERES
3 CREAM (GRAM) TOPICAL ONCE
Refills: 0 | Status: COMPLETED | OUTPATIENT
Start: 2023-12-09 | End: 2023-12-09

## 2023-12-09 RX ORDER — FUROSEMIDE 40 MG
20 TABLET ORAL ONCE
Refills: 0 | Status: COMPLETED | OUTPATIENT
Start: 2023-12-09 | End: 2023-12-09

## 2023-12-09 RX ADMIN — Medication 3 MILLIGRAM(S): at 01:05

## 2023-12-09 RX ADMIN — FINASTERIDE 5 MILLIGRAM(S): 5 TABLET, FILM COATED ORAL at 12:01

## 2023-12-09 RX ADMIN — Medication 3 MILLILITER(S): at 05:20

## 2023-12-09 RX ADMIN — Medication 20 MILLIGRAM(S): at 12:02

## 2023-12-09 RX ADMIN — TIOTROPIUM BROMIDE 2 PUFF(S): 18 CAPSULE ORAL; RESPIRATORY (INHALATION) at 09:43

## 2023-12-09 RX ADMIN — Medication 20 MILLIGRAM(S): at 05:20

## 2023-12-09 RX ADMIN — CLOPIDOGREL BISULFATE 75 MILLIGRAM(S): 75 TABLET, FILM COATED ORAL at 12:01

## 2023-12-09 RX ADMIN — TAMSULOSIN HYDROCHLORIDE 0.4 MILLIGRAM(S): 0.4 CAPSULE ORAL at 21:13

## 2023-12-09 RX ADMIN — Medication 100 MILLIGRAM(S): at 18:54

## 2023-12-09 RX ADMIN — IRON SUCROSE 110 MILLIGRAM(S): 20 INJECTION, SOLUTION INTRAVENOUS at 15:44

## 2023-12-09 RX ADMIN — Medication 3 MILLILITER(S): at 09:40

## 2023-12-09 RX ADMIN — Medication 100 MILLIGRAM(S): at 06:12

## 2023-12-09 RX ADMIN — SENNA PLUS 2 TABLET(S): 8.6 TABLET ORAL at 21:13

## 2023-12-09 RX ADMIN — APIXABAN 5 MILLIGRAM(S): 2.5 TABLET, FILM COATED ORAL at 05:20

## 2023-12-09 RX ADMIN — APIXABAN 5 MILLIGRAM(S): 2.5 TABLET, FILM COATED ORAL at 18:55

## 2023-12-09 RX ADMIN — Medication 75 MICROGRAM(S): at 05:20

## 2023-12-09 RX ADMIN — Medication 3 MILLILITER(S): at 18:54

## 2023-12-09 RX ADMIN — ATORVASTATIN CALCIUM 40 MILLIGRAM(S): 80 TABLET, FILM COATED ORAL at 21:13

## 2023-12-09 RX ADMIN — Medication 10 MILLIEQUIVALENT(S): at 12:01

## 2023-12-09 RX ADMIN — DAPTOMYCIN 116 MILLIGRAM(S): 500 INJECTION, POWDER, LYOPHILIZED, FOR SOLUTION INTRAVENOUS at 15:05

## 2023-12-09 NOTE — PROGRESS NOTE ADULT - ASSESSMENT
89M with history of HTN, HLD, COPD (on intermittent 2L NC), CAD (no stents), s/p bioprosthetic AVR, CVA in 10/2022, s/p R total hip replacement ~30 years ago, afib/aflutter, hypothyroid, CKD, bladder cancer, and cholangiocarcinoma (s/p treatment, stable) presenting from home with worsening R hip pain. Accompanied by granddaughter at bedside. Patient was found to have UTI in 9/2023 and was treated for 1 month. In 10/2023, patient was found to be bacteremic with septic joint on R hip, was treated with 6 week course of IV antibiotics without surgical debridement or operation given age and other comorbidities. Patient was discharged from [place] to [facility], was able to work with PT appropriately. He finished last dose of IV antibiotics and was discharged home on Tuesday. He was seen by Dr. Redding in clinic yesterday and was being planned for IV antibiotics but has not started yet. He started having significant pain on the R hip again since discharge, now unable to bear weight. No fever or chills. No falls or trauma    right hip infection  - Blood cultures times 2  - daptomycin 6 mg/kg q day  - Consult Dr. Vickers, ( ortho)  as family is now willing to consider surgery to improve his quality of life   - ID consult appreciated  - CT of right hip ordered  - ID following    aravind lower ext edema with dyspnea  - CXR  - check doppler  - iv lasix 40 qd    severe prosthetic aortic valve stenosis  - cardio consult    anemia  - iron studies c/w iron def  - FOBT  - iv iron    afib , AVR  - c/w metoprolol  - c/w eliquis    CAD  - c/w plavix    COPD  - c/w inhalers    BPH  - flomax and finasteride    HLD  - c/w lipitor    dvt px  - on eliquis                  89M with history of HTN, HLD, COPD (on intermittent 2L NC), CAD (no stents), s/p bioprosthetic AVR, CVA in 10/2022, s/p R total hip replacement ~30 years ago, afib/aflutter, hypothyroid, CKD, bladder cancer, and cholangiocarcinoma (s/p treatment, stable) presenting from home with worsening R hip pain. Accompanied by granddaughter at bedside. Patient was found to have UTI in 9/2023 and was treated for 1 month. In 10/2023, patient was found to be bacteremic with septic joint on R hip, was treated with 6 week course of IV antibiotics without surgical debridement or operation given age and other comorbidities. Patient was discharged from [place] to [facility], was able to work with PT appropriately. He finished last dose of IV antibiotics and was discharged home on Tuesday. He was seen by Dr. Redding in clinic yesterday and was being planned for IV antibiotics but has not started yet. He started having significant pain on the R hip again since discharge, now unable to bear weight. No fever or chills. No falls or trauma    right hip infection  - Blood cultures times 2  - daptomycin 6 mg/kg q day  - Consult Dr. Vickers, ( ortho)  as family is now willing to consider surgery to improve his quality of life   - ID consult appreciated  - CT of right hip ordered  - ID following    aravind lower ext edema with dyspnea  - CXR  - check doppler  - iv lasix 40 qd    severe prosthetic aortic valve stenosis  - cardio consult    anemia  - iron studies c/w iron def  - FOBT  - iv iron    afib , AVR  - c/w metoprolol  - c/w eliquis    CAD  - c/w plavix    COPD  - c/w inhalers    BPH  - flomax and finasteride    HLD  - c/w lipitor    dvt px  - on eliquis

## 2023-12-09 NOTE — PROGRESS NOTE ADULT - SUBJECTIVE AND OBJECTIVE BOX
DATE OF SERVICE: 12-09-23 @ 11:41    Patient is a 89y old  Male who presents with a chief complaint of hip pain (08 Dec 2023 09:11)      SUBJECTIVE / OVERNIGHT EVENTS:  c/o sob    MEDICATIONS  (STANDING):  albuterol/ipratropium for Nebulization 3 milliLiter(s) Nebulizer every 6 hours  apixaban 5 milliGRAM(s) Oral two times a day  atorvastatin 40 milliGRAM(s) Oral at bedtime  clopidogrel Tablet 75 milliGRAM(s) Oral daily  DAPTOmycin IVPB 400 milliGRAM(s) IV Intermittent every 24 hours  finasteride 5 milliGRAM(s) Oral daily  furosemide   Injectable 20 milliGRAM(s) IV Push once  iron sucrose IVPB 200 milliGRAM(s) IV Intermittent every 24 hours  levothyroxine 75 MICROGram(s) Oral daily  metoprolol succinate  milliGRAM(s) Oral two times a day  potassium chloride    Tablet ER 10 milliEquivalent(s) Oral daily  senna 2 Tablet(s) Oral at bedtime  tamsulosin 0.4 milliGRAM(s) Oral at bedtime  tiotropium 2.5 MICROgram(s) Inhaler 2 Puff(s) Inhalation daily    MEDICATIONS  (PRN):  sodium chloride 0.65% Nasal 1 Spray(s) Both Nostrils four times a day PRN Nasal Congestion      Vital Signs Last 24 Hrs  T(C): 36.7 (09 Dec 2023 09:50), Max: 36.8 (09 Dec 2023 04:35)  T(F): 98.1 (09 Dec 2023 09:50), Max: 98.2 (09 Dec 2023 04:35)  HR: 77 (09 Dec 2023 09:50) (72 - 98)  BP: 97/61 (09 Dec 2023 09:50) (97/61 - 117/60)  BP(mean): --  RR: 18 (09 Dec 2023 09:50) (18 - 18)  SpO2: 97% (09 Dec 2023 09:50) (97% - 99%)    Parameters below as of 09 Dec 2023 09:50  Patient On (Oxygen Delivery Method): nasal cannula  O2 Flow (L/min): 2    CAPILLARY BLOOD GLUCOSE        I&O's Summary    08 Dec 2023 07:01  -  09 Dec 2023 07:00  --------------------------------------------------------  IN: 870 mL / OUT: 801 mL / NET: 69 mL    09 Dec 2023 07:01  -  09 Dec 2023 11:41  --------------------------------------------------------  IN: 0 mL / OUT: 350 mL / NET: -350 mL        PHYSICAL EXAM:  GENERAL: NAD, well-developed  HEAD:  Atraumatic, Normocephalic  EYES: EOMI, PERRLA, conjunctiva and sclera clear  NECK: Supple, No JVD  CHEST/LUNG: Clear to auscultation bilaterally; No wheeze  HEART: Regular rate and rhythm; No murmurs, rubs, or gallops  ABDOMEN: Soft, Nontender, Nondistended; Bowel sounds present  EXTREMITIES:  2+ Peripheral Pulses, No clubbing, cyanosis, aravind edema  PSYCH: AAOx3  NEUROLOGY: non-focal  SKIN: No rashes or lesions    LABS:                        7.3    8.98  )-----------( 173      ( 09 Dec 2023 07:09 )             23.6     12-09    138  |  104  |  34<H>  ----------------------------<  105<H>  3.6   |  24  |  1.22    Ca    8.5      09 Dec 2023 07:11  Phos  3.6     12-08  Mg     2.3     12-08    TPro  6.1  /  Alb  2.8<L>  /  TBili  0.6  /  DBili  x   /  AST  29  /  ALT  31  /  AlkPhos  182<H>  12-09          Urinalysis Basic - ( 09 Dec 2023 07:11 )    Color: x / Appearance: x / SG: x / pH: x  Gluc: 105 mg/dL / Ketone: x  / Bili: x / Urobili: x   Blood: x / Protein: x / Nitrite: x   Leuk Esterase: x / RBC: x / WBC x   Sq Epi: x / Non Sq Epi: x / Bacteria: x    < from: TTE W or WO Ultrasound Enhancing Agent (12.08.23 @ 13:16) >  CONCLUSIONS:      1. Left ventricular cavity is small. Left ventricular wall thickness is normal. Left ventricular systolic function is normal with an ejection fraction of 66 % by Deal's method of disks. There are no regional wall motion abnormalities seen.   2. There is moderate (grade 2) left ventricular diastolic dysfunction, with elevated filling pressure.   3. Normal right ventricular cavity size, wall thickness, and systolic function.   4. A bioprosthetic valve replacement present in the aortic position. Degeneration of the aortic valve prosthesis. The prosthetic aortic valve thickened leaflets. Severe prosthetic aortic stenosis. Mild intravalvular regurgitation.   5. No pericardial effusion seen.   6. Estimated pulmonary artery systolic pressure is 43 mmHg, consistent with mild pulmonary hypertension.   7. Findings were discussed with Dr. Malik on 12/8/2023 at 5.00 pm. No prior echocardiogram is available for comparison.   8. There is normal LV mass and concentric remodeling.    ________________________________________________________________________________________    < end of copied text >    < from: CT Pelvis w/ IV Cont (12.08.23 @ 22:21) >    IMPRESSION:    Nonspecific right hip joint effusion with surrounding synovitis, which   may represent septic arthritis in the appropriate clinical setting. Foci   of gas within the right hip joint space may be related to recent   intervention or be related to a gas-forming/necrotizing infection.   Recommend aspiration of the right hip joint space if one has not already   recently been performed.    Lucency at the bone-implant interface along the right greater trochanter,   which may be postoperative or be related to osteolysis/loosening.    Small rim-enhancing collection within the right thigh adductor   musculature adjacentto the femur, concerning for an abscess given the   clinical history.    Asymmetric thickening of the right psoas muscle with mild surrounding fat   stranding, which may represent associated myositis of the right iliopsoas   muscle given the clinicalhistory. No rim-enhancing collection is seen   within the right psoas muscle.    Avascular necrosis of the LEFT femoral head without subchondral collapse.    Multiple bladder stones are noted.    Other findings as above.      --- End of Report ---        < end of copied text >    RADIOLOGY & ADDITIONAL TESTS:    Imaging Personally Reviewed:    Consultant(s) Notes Reviewed:      Care Discussed with Consultants/Other Providers:

## 2023-12-09 NOTE — CHART NOTE - NSCHARTNOTEFT_GEN_A_CORE
CT R hip reviewed showing small R hip effusion. Unlikely anything can be drained. Recommend med/ID management (IV abx) at this time

## 2023-12-09 NOTE — PROGRESS NOTE ADULT - SUBJECTIVE AND OBJECTIVE BOX
ORTHOPEDIC PROGRESS NOTE    Overnight events: None    SUBJECTIVE: Pt seen and examined at bedside. Patient is doing well, no acute complaints this AM. Pain is controlled with medication      OBJECTIVE:  Vital Signs Last 24 Hrs  T(C): 36.8 (09 Dec 2023 04:35), Max: 36.8 (09 Dec 2023 04:35)  T(F): 98.2 (09 Dec 2023 04:35), Max: 98.2 (09 Dec 2023 04:35)  HR: 98 (09 Dec 2023 04:35) (72 - 98)  BP: 117/60 (09 Dec 2023 04:35) (106/57 - 117/60)  BP(mean): --  RR: 18 (09 Dec 2023 04:35) (18 - 18)  SpO2: 98% (09 Dec 2023 04:35) (97% - 99%)    Parameters below as of 09 Dec 2023 04:35  Patient On (Oxygen Delivery Method): nasal cannula  O2 Flow (L/min): 2        12-08-23 @ 07:01  -  12-09-23 @ 07:00  --------------------------------------------------------  IN: 870 mL / OUT: 801 mL / NET: 69 mL        Physical Examination:  GEN: NAD, resting quietly  PULM: symmetric chest rise bilaterally, no increased WOB  ABD: nondistended  EXTR:   L hip   Skin intact, incision well healed, TTP over the L hip  FROM of the L hip w pain at 90, minimal pain with internal/external rotation  Able to SLR, neg log roll/axial  SILT, NVI      LABS:                        7.3    8.98  )-----------( 173      ( 09 Dec 2023 07:09 )             23.6       12-09    138  |  104  |  34<H>  ----------------------------<  105<H>  3.6   |  24  |  1.22    Ca    8.5      09 Dec 2023 07:11  Phos  3.6     12-08  Mg     2.3     12-08    TPro  6.1  /  Alb  2.8<L>  /  TBili  0.6  /  DBili  x   /  AST  29  /  ALT  31  /  AlkPhos  182<H>  12-09

## 2023-12-09 NOTE — CHART NOTE - NSCHARTNOTEFT_GEN_A_CORE
[facility] discussed at the bedside with daughter Giorgio Francis who is the patient's healthcare proxy. Daughter would like to follow her father's wishes: to not be intubated if increased work of breathing, nor resuscitated if heart stops. Would like a trial of NON-invasive ventilation and pursue all medical treatment with IV antibiotics and IV fluids as medically necessary. Attending Dr. Izquierdo aware and in agreement.     Kaleigh Sanches PA-C  Department of Medicine  [id number] [facility] discussed at the bedside with daughter Giorgio Francis who is the patient's healthcare proxy. Daughter would like to follow her father's wishes: to not be intubated if increased work of breathing, nor resuscitated if heart stops. Would like a trial of NON-invasive ventilation and pursue all medical treatment with IV antibiotics and IV fluids as medically necessary. Attending Dr. Izquierdo aware and in agreement.     Kaleigh Sanches PA-C  Department of Medicine  [id number]

## 2023-12-09 NOTE — PROGRESS NOTE ADULT - ATTENDING COMMENTS
I agree with the above note and have personally seen and examined this patient. All pertinent films have been reviewed. Please refer to clinical documentation of the history, physical examinations, data summary, and both assessment and plan as documented above and with which I agree.    not in pain  ambulated a few steps yesterday with minimal pain he says  continue pt  abx per ID  no surgery for now  ct obtained, no obvious abscess cavity, pending official read. if no abscess then no need for IR consult  unclear benefits of surgical intervention  pulmonary toilet    Gene Vickers MD  Attending Orthopedic Surgeon

## 2023-12-09 NOTE — PROGRESS NOTE ADULT - ASSESSMENT
Assessment:  89M complex medical history. Has a R DARRYL from 30 years ago s/p I&D and PE exchagne 10 years ago at OSH. 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 Just stopped abx and pain worsened. Cannot bear weight.     Plan:  - WBAT RLE  - f/u CT R hip w/wo con (official read) to evaluate for abscess cavity-- if abscess found, IR consult for possible drainage  - f/u BCx, ESR/CRP  - f/u ID consult ([place])  - may need u/s to eval valve for vegetations  - ABX per ID  - DVT PPX per primary      For all questions related to patient care, please reach out to the on-call team via the pager.     Talia Marcos, PGY 2  Orthopaedic Surgery  [facility] [id number]  [facility] [id number]  [facility] [phone]   Assessment:  89M complex medical history. Has a R DARRYL from 30 years ago s/p I&D and PE exchagne 10 years ago at OSH. 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 Just stopped abx and pain worsened. Cannot bear weight.     Plan:  - WBAT RLE  - f/u CT R hip w/wo con (official read) to evaluate for abscess cavity-- if abscess found, IR consult for possible drainage  - f/u BCx, ESR/CRP  - f/u ID consult ([place])  - may need u/s to eval valve for vegetations  - ABX per ID  - DVT PPX per primary      For all questions related to patient care, please reach out to the on-call team via the pager.     Talia Marcos, PGY 2  Orthopaedic Surgery  [facility] [phone]  [facility] [phone]  [facility] [phone]

## 2023-12-10 DIAGNOSIS — I48.91 UNSPECIFIED ATRIAL FIBRILLATION: ICD-10-CM

## 2023-12-10 DIAGNOSIS — T82.857A STENOSIS OF OTHER CARDIAC PROSTHETIC DEVICES, IMPLANTS AND GRAFTS, INITIAL ENCOUNTER: ICD-10-CM

## 2023-12-10 DIAGNOSIS — T84.59XA INFECTION AND INFLAMMATORY REACTION DUE TO OTHER INTERNAL JOINT PROSTHESIS, INITIAL ENCOUNTER: ICD-10-CM

## 2023-12-10 LAB
ALBUMIN SERPL ELPH-MCNC: 2.9 G/DL — LOW (ref 3.3–5)
ALBUMIN SERPL ELPH-MCNC: 2.9 G/DL — LOW (ref 3.3–5)
ALP SERPL-CCNC: 192 U/L — HIGH (ref 40–120)
ALP SERPL-CCNC: 192 U/L — HIGH (ref 40–120)
ALT FLD-CCNC: 30 U/L — SIGNIFICANT CHANGE UP (ref 10–45)
ALT FLD-CCNC: 30 U/L — SIGNIFICANT CHANGE UP (ref 10–45)
ANION GAP SERPL CALC-SCNC: 10 MMOL/L — SIGNIFICANT CHANGE UP (ref 5–17)
ANION GAP SERPL CALC-SCNC: 10 MMOL/L — SIGNIFICANT CHANGE UP (ref 5–17)
AST SERPL-CCNC: 27 U/L — SIGNIFICANT CHANGE UP (ref 10–40)
AST SERPL-CCNC: 27 U/L — SIGNIFICANT CHANGE UP (ref 10–40)
BILIRUB SERPL-MCNC: 0.6 MG/DL — SIGNIFICANT CHANGE UP (ref 0.2–1.2)
BILIRUB SERPL-MCNC: 0.6 MG/DL — SIGNIFICANT CHANGE UP (ref 0.2–1.2)
BUN SERPL-MCNC: 36 MG/DL — HIGH (ref 7–23)
BUN SERPL-MCNC: 36 MG/DL — HIGH (ref 7–23)
CALCIUM SERPL-MCNC: 8.6 MG/DL — SIGNIFICANT CHANGE UP (ref 8.4–10.5)
CALCIUM SERPL-MCNC: 8.6 MG/DL — SIGNIFICANT CHANGE UP (ref 8.4–10.5)
CHLORIDE SERPL-SCNC: 102 MMOL/L — SIGNIFICANT CHANGE UP (ref 96–108)
CHLORIDE SERPL-SCNC: 102 MMOL/L — SIGNIFICANT CHANGE UP (ref 96–108)
CO2 SERPL-SCNC: 25 MMOL/L — SIGNIFICANT CHANGE UP (ref 22–31)
CO2 SERPL-SCNC: 25 MMOL/L — SIGNIFICANT CHANGE UP (ref 22–31)
CREAT SERPL-MCNC: 1.29 MG/DL — SIGNIFICANT CHANGE UP (ref 0.5–1.3)
CREAT SERPL-MCNC: 1.29 MG/DL — SIGNIFICANT CHANGE UP (ref 0.5–1.3)
EGFR: 51 ML/MIN/1.73M2 — LOW
EGFR: 51 ML/MIN/1.73M2 — LOW
GLUCOSE SERPL-MCNC: 112 MG/DL — HIGH (ref 70–99)
GLUCOSE SERPL-MCNC: 112 MG/DL — HIGH (ref 70–99)
HCT VFR BLD CALC: 23.4 % — LOW (ref 39–50)
HCT VFR BLD CALC: 23.4 % — LOW (ref 39–50)
HGB BLD-MCNC: 7.2 G/DL — LOW (ref 13–17)
HGB BLD-MCNC: 7.2 G/DL — LOW (ref 13–17)
MCHC RBC-ENTMCNC: 30.8 GM/DL — LOW (ref 32–36)
MCHC RBC-ENTMCNC: 30.8 GM/DL — LOW (ref 32–36)
MCHC RBC-ENTMCNC: 31.2 PG — SIGNIFICANT CHANGE UP (ref 27–34)
MCHC RBC-ENTMCNC: 31.2 PG — SIGNIFICANT CHANGE UP (ref 27–34)
MCV RBC AUTO: 101.3 FL — HIGH (ref 80–100)
MCV RBC AUTO: 101.3 FL — HIGH (ref 80–100)
NRBC # BLD: 0 /100 WBCS — SIGNIFICANT CHANGE UP (ref 0–0)
NRBC # BLD: 0 /100 WBCS — SIGNIFICANT CHANGE UP (ref 0–0)
PLATELET # BLD AUTO: 190 K/UL — SIGNIFICANT CHANGE UP (ref 150–400)
PLATELET # BLD AUTO: 190 K/UL — SIGNIFICANT CHANGE UP (ref 150–400)
POTASSIUM SERPL-MCNC: 3.6 MMOL/L — SIGNIFICANT CHANGE UP (ref 3.5–5.3)
POTASSIUM SERPL-MCNC: 3.6 MMOL/L — SIGNIFICANT CHANGE UP (ref 3.5–5.3)
POTASSIUM SERPL-SCNC: 3.6 MMOL/L — SIGNIFICANT CHANGE UP (ref 3.5–5.3)
POTASSIUM SERPL-SCNC: 3.6 MMOL/L — SIGNIFICANT CHANGE UP (ref 3.5–5.3)
PROT SERPL-MCNC: 6.2 G/DL — SIGNIFICANT CHANGE UP (ref 6–8.3)
PROT SERPL-MCNC: 6.2 G/DL — SIGNIFICANT CHANGE UP (ref 6–8.3)
RBC # BLD: 2.31 M/UL — LOW (ref 4.2–5.8)
RBC # BLD: 2.31 M/UL — LOW (ref 4.2–5.8)
RBC # FLD: 16.3 % — HIGH (ref 10.3–14.5)
RBC # FLD: 16.3 % — HIGH (ref 10.3–14.5)
SODIUM SERPL-SCNC: 137 MMOL/L — SIGNIFICANT CHANGE UP (ref 135–145)
SODIUM SERPL-SCNC: 137 MMOL/L — SIGNIFICANT CHANGE UP (ref 135–145)
WBC # BLD: 10.12 K/UL — SIGNIFICANT CHANGE UP (ref 3.8–10.5)
WBC # BLD: 10.12 K/UL — SIGNIFICANT CHANGE UP (ref 3.8–10.5)
WBC # FLD AUTO: 10.12 K/UL — SIGNIFICANT CHANGE UP (ref 3.8–10.5)
WBC # FLD AUTO: 10.12 K/UL — SIGNIFICANT CHANGE UP (ref 3.8–10.5)

## 2023-12-10 RX ADMIN — Medication 3 MILLILITER(S): at 23:27

## 2023-12-10 RX ADMIN — SENNA PLUS 2 TABLET(S): 8.6 TABLET ORAL at 21:09

## 2023-12-10 RX ADMIN — APIXABAN 5 MILLIGRAM(S): 2.5 TABLET, FILM COATED ORAL at 05:42

## 2023-12-10 RX ADMIN — DAPTOMYCIN 116 MILLIGRAM(S): 500 INJECTION, POWDER, LYOPHILIZED, FOR SOLUTION INTRAVENOUS at 14:03

## 2023-12-10 RX ADMIN — Medication 3 MILLILITER(S): at 12:39

## 2023-12-10 RX ADMIN — APIXABAN 5 MILLIGRAM(S): 2.5 TABLET, FILM COATED ORAL at 16:58

## 2023-12-10 RX ADMIN — Medication 40 MILLIGRAM(S): at 05:42

## 2023-12-10 RX ADMIN — CLOPIDOGREL BISULFATE 75 MILLIGRAM(S): 75 TABLET, FILM COATED ORAL at 12:38

## 2023-12-10 RX ADMIN — Medication 3 MILLILITER(S): at 00:51

## 2023-12-10 RX ADMIN — FINASTERIDE 5 MILLIGRAM(S): 5 TABLET, FILM COATED ORAL at 12:38

## 2023-12-10 RX ADMIN — TIOTROPIUM BROMIDE 2 PUFF(S): 18 CAPSULE ORAL; RESPIRATORY (INHALATION) at 12:39

## 2023-12-10 RX ADMIN — Medication 100 MILLIGRAM(S): at 05:42

## 2023-12-10 RX ADMIN — IRON SUCROSE 110 MILLIGRAM(S): 20 INJECTION, SOLUTION INTRAVENOUS at 14:03

## 2023-12-10 RX ADMIN — Medication 3 MILLILITER(S): at 05:42

## 2023-12-10 RX ADMIN — TAMSULOSIN HYDROCHLORIDE 0.4 MILLIGRAM(S): 0.4 CAPSULE ORAL at 21:10

## 2023-12-10 RX ADMIN — ATORVASTATIN CALCIUM 40 MILLIGRAM(S): 80 TABLET, FILM COATED ORAL at 21:10

## 2023-12-10 RX ADMIN — Medication 100 MILLIGRAM(S): at 16:58

## 2023-12-10 RX ADMIN — Medication 75 MICROGRAM(S): at 05:42

## 2023-12-10 RX ADMIN — Medication 10 MILLIEQUIVALENT(S): at 12:39

## 2023-12-10 RX ADMIN — Medication 3 MILLILITER(S): at 16:59

## 2023-12-10 NOTE — PROGRESS NOTE ADULT - SUBJECTIVE AND OBJECTIVE BOX
DATE OF SERVICE: 12-10-23 @ 10:48    Patient is a 89y old  Male who presents with a chief complaint of hip pain (08 Dec 2023 09:11)      SUBJECTIVE / OVERNIGHT EVENTS:  No chest pain. No shortness of breath. No complaints. No events overnight.     MEDICATIONS  (STANDING):  albuterol/ipratropium for Nebulization 3 milliLiter(s) Nebulizer every 6 hours  apixaban 5 milliGRAM(s) Oral two times a day  atorvastatin 40 milliGRAM(s) Oral at bedtime  clopidogrel Tablet 75 milliGRAM(s) Oral daily  DAPTOmycin IVPB 400 milliGRAM(s) IV Intermittent every 24 hours  finasteride 5 milliGRAM(s) Oral daily  furosemide   Injectable 40 milliGRAM(s) IV Push daily  iron sucrose IVPB 200 milliGRAM(s) IV Intermittent every 24 hours  levothyroxine 75 MICROGram(s) Oral daily  metoprolol succinate  milliGRAM(s) Oral two times a day  potassium chloride    Tablet ER 10 milliEquivalent(s) Oral daily  senna 2 Tablet(s) Oral at bedtime  tamsulosin 0.4 milliGRAM(s) Oral at bedtime  tiotropium 2.5 MICROgram(s) Inhaler 2 Puff(s) Inhalation daily    MEDICATIONS  (PRN):  sodium chloride 0.65% Nasal 1 Spray(s) Both Nostrils four times a day PRN Nasal Congestion      Vital Signs Last 24 Hrs  T(C): 36.5 (10 Dec 2023 09:13), Max: 36.7 (10 Dec 2023 05:10)  T(F): 97.7 (10 Dec 2023 09:13), Max: 98 (10 Dec 2023 05:10)  HR: 95 (10 Dec 2023 09:13) (79 - 95)  BP: 99/63 (10 Dec 2023 09:13) (99/63 - 113/72)  BP(mean): --  RR: 18 (10 Dec 2023 09:13) (18 - 18)  SpO2: 94% (10 Dec 2023 09:13) (93% - 97%)    Parameters below as of 10 Dec 2023 09:13  Patient On (Oxygen Delivery Method): nasal cannula  O2 Flow (L/min): 2    CAPILLARY BLOOD GLUCOSE        I&O's Summary    09 Dec 2023 07:01  -  10 Dec 2023 07:00  --------------------------------------------------------  IN: 1110 mL / OUT: 1349 mL / NET: -239 mL    10 Dec 2023 07:01  -  10 Dec 2023 10:48  --------------------------------------------------------  IN: 240 mL / OUT: 400 mL / NET: -160 mL        PHYSICAL EXAM:  GENERAL: NAD, well-developed  HEAD:  Atraumatic, Normocephalic  EYES: EOMI, PERRLA, conjunctiva and sclera clear  NECK: Supple, No JVD  CHEST/LUNG: Clear to auscultation bilaterally; No wheeze  HEART: Regular rate and rhythm; No murmurs, rubs, or gallops  ABDOMEN: Soft, Nontender, Nondistended; Bowel sounds present  EXTREMITIES:  2+ Peripheral Pulses, No clubbing, cyanosis, aravind edema  PSYCH: AAOx3  NEUROLOGY: non-focal  SKIN: No rashes or lesions    LABS:                        7.2    10.12 )-----------( 190      ( 10 Dec 2023 07:26 )             23.4     12-10    137  |  102  |  36<H>  ----------------------------<  112<H>  3.6   |  25  |  1.29    Ca    8.6      10 Dec 2023 07:28    TPro  6.2  /  Alb  2.9<L>  /  TBili  0.6  /  DBili  x   /  AST  27  /  ALT  30  /  AlkPhos  192<H>  12-10          Urinalysis Basic - ( 10 Dec 2023 07:28 )    Color: x / Appearance: x / SG: x / pH: x  Gluc: 112 mg/dL / Ketone: x  / Bili: x / Urobili: x   Blood: x / Protein: x / Nitrite: x   Leuk Esterase: x / RBC: x / WBC x   Sq Epi: x / Non Sq Epi: x / Bacteria: x        RADIOLOGY & ADDITIONAL TESTS:    Imaging Personally Reviewed:    Consultant(s) Notes Reviewed:      Care Discussed with Consultants/Other Providers:

## 2023-12-10 NOTE — PROGRESS NOTE ADULT - ASSESSMENT
89M with history of HTN, HLD, COPD (on intermittent 2L NC), CAD (no stents), s/p bioprosthetic AVR, CVA in 10/2022, s/p R total hip replacement ~30 years ago, afib/aflutter, hypothyroid, CKD, bladder cancer, and cholangiocarcinoma (s/p treatment, stable) presenting from home with worsening R hip pain. Accompanied by granddaughter at bedside. Patient was found to have UTI in 9/2023 and was treated for 1 month. In 10/2023, patient was found to be bacteremic with septic joint on R hip, was treated with 6 week course of IV antibiotics without surgical debridement or operation given age and other comorbidities. Patient was discharged from [place] to [facility], was able to work with PT appropriately. He finished last dose of IV antibiotics and was discharged home on Tuesday. He was seen by Dr. Redding in clinic yesterday and was being planned for IV antibiotics but has not started yet. He started having significant pain on the R hip again since discharge, now unable to bear weight. No fever or chills. No falls or trauma    right hip infection  - Blood cultures times 2  - daptomycin 6 mg/kg q day  - Consult Dr. Vickers, ( ortho)  as family is now willing to consider surgery to improve his quality of life   - ID consult appreciated  - CT of right hip ordered  - ID following    aravind lower ext edema with dyspnea  - CXR  - check doppler  - iv lasix 40 qd    severe prosthetic aortic valve stenosis  - cardio consult called    anemia  - iron studies c/w iron def  - FOBT  - iv iron    afib , AVR  - c/w metoprolol  - c/w eliquis    CAD  - c/w plavix    COPD  - c/w inhalers    BPH  - flomax and finasteride    HLD  - c/w lipitor    dvt px  - on eliquis                  89M with history of HTN, HLD, COPD (on intermittent 2L NC), CAD (no stents), s/p bioprosthetic AVR, CVA in 10/2022, s/p R total hip replacement ~30 years ago, afib/aflutter, hypothyroid, CKD, bladder cancer, and cholangiocarcinoma (s/p treatment, stable) presenting from home with worsening R hip pain. Accompanied by granddaughter at bedside. Patient was found to have UTI in 9/2023 and was treated for 1 month. In 10/2023, patient was found to be bacteremic with septic joint on R hip, was treated with 6 week course of IV antibiotics without surgical debridement or operation given age and other comorbidities. Patient was discharged from [place] to [facility], was able to work with PT appropriately. He finished last dose of IV antibiotics and was discharged home on Tuesday. He was seen by Dr. Redding in clinic yesterday and was being planned for IV antibiotics but has not started yet. He started having significant pain on the R hip again since discharge, now unable to bear weight. No fever or chills. No falls or trauma    right hip infection  - Blood cultures times 2  - daptomycin 6 mg/kg q day  - Consult Dr. Vickers, ( ortho)  as family is now willing to consider surgery to improve his quality of life   - ID consult appreciated  - CT of right hip ordered  - ID following    aravind lower ext edema with dyspnea  - CXR  - check doppler  - iv lasix 40 qd    severe prosthetic aortic valve stenosis  - cardio consult called    anemia  - iron studies c/w iron def  - FOBT  - iv iron    afib , AVR  - c/w metoprolol  - c/w eliquis    CAD  - c/w plavix    COPD  - c/w inhalers    BPH  - flomax and finasteride    HLD  - c/w lipitor    dvt px  - on eliquis

## 2023-12-10 NOTE — CONSULT NOTE ADULT - ASSESSMENT
89M with history of HTN, HLD, COPD (on intermittent 2L NC), CAD (no stents), s/p bioprosthetic AVR, CVA in 10/2022, s/p R total hip replacement ~30 years ago, afib/aflutter, hypothyroid, CKD, bladder cancer, and cholangiocarcinoma (s/p treatment, stable) presenting from home with worsening R hip pain. Patient was found to have UTI in 9/2023 and was treated for 1 month. In 10/2023, patient was found to be bacteremic with septic joint on R hip, was treated with 6 week course of IV antibiotics without surgical debridement or operation given age and other comorbidities. Patient was discharged from [place] to [facility], was able to work with PT appropriately. He finished last dose of IV antibiotics and was discharged home on Tuesday. He was seen by Dr. Darby and was being planned for IV antibiotics but has not started yet. He started having significant pain on the R hip again, now unable to bear weight. No fever or chills. 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 Patient was found to have UTI in 9/2023 and was treated for 1 month. In 10/2023, patient was found to be bacteremic with septic joint on R hip, was treated with 6 week course of IV antibiotics without surgical debridement or operation given age and other comorbidities. Patient was discharged from [place] to [facility], was able to work with PT appropriately. He finished last dose of IV antibiotics and was discharged home on Tuesday. He was seen by Dr. Darby and was being planned for IV antibiotics but has not started yet. He started having significant pain on the R hip again, now unable to bear weight. No fever or chills. 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

## 2023-12-10 NOTE — CONSULT NOTE ADULT - TIME BILLING
Advanced care planning was discussed with patient and family.  Advanced care planning forms were reviewed and discussed as appropriate.  Differential diagnosis and plan of care discussed with patient after the evaluation.   Pain assessed and judicious use of narcotics when appropriate was discussed.  Importance of Fall prevention discussed.  Counseling on Smoking and Alcohol cessation was offered when appropriate.  Counseling on Diet, exercise, and medication compliance was done.
d/w ortho, primary team, ID team, patient/family

## 2023-12-10 NOTE — CONSULT NOTE ADULT - SUBJECTIVE AND OBJECTIVE BOX
CHIEF COMPLAINT:    HISTORY OF PRESENT ILLNESS:  89M with history of HTN, HLD, COPD (on intermittent 2L NC), CAD (no stents), s/p bioprosthetic AVR, CVA in 10/2022, s/p R total hip replacement ~30 years ago, afib/aflutter, hypothyroid, CKD, bladder cancer, and cholangiocarcinoma (s/p treatment, stable) presenting from home with worsening R hip pain. Patient was found to have UTI in 9/2023 and was treated for 1 month. In 10/2023, patient was found to be bacteremic with septic joint on R hip, was treated with 6 week course of IV antibiotics without surgical debridement or operation given age and other comorbidities. Patient was discharged from [place] to [facility], was able to work with PT appropriately. He finished last dose of IV antibiotics and was discharged home on Tuesday. He was seen by Dr. Darby and was being planned for IV antibiotics but has not started yet. He started having significant pain on the R hip again, now unable to bear weight. No fever or chills. 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      PAST MEDICAL & SURGICAL HISTORY:  Atrial fibrillation      Atrial flutter      CAD (coronary artery disease)      S/P aortic valve replacement with bioprosthetic valve      History of COPD      CVA (cerebrovascular accident)      Hypothyroid      Bladder cancer      Cholangiocarcinoma      S/P hip replacement, right              MEDICATIONS:  apixaban 5 milliGRAM(s) Oral two times a day  clopidogrel Tablet 75 milliGRAM(s) Oral daily  furosemide   Injectable 40 milliGRAM(s) IV Push daily  metoprolol succinate  milliGRAM(s) Oral two times a day    DAPTOmycin IVPB 400 milliGRAM(s) IV Intermittent every 24 hours    albuterol/ipratropium for Nebulization 3 milliLiter(s) Nebulizer every 6 hours  tiotropium 2.5 MICROgram(s) Inhaler 2 Puff(s) Inhalation daily      senna 2 Tablet(s) Oral at bedtime    atorvastatin 40 milliGRAM(s) Oral at bedtime  finasteride 5 milliGRAM(s) Oral daily  levothyroxine 75 MICROGram(s) Oral daily    iron sucrose IVPB 200 milliGRAM(s) IV Intermittent every 24 hours  potassium chloride    Tablet ER 10 milliEquivalent(s) Oral daily  sodium chloride 0.65% Nasal 1 Spray(s) Both Nostrils four times a day PRN  tamsulosin 0.4 milliGRAM(s) Oral at bedtime      FAMILY HISTORY:      SOCIAL HISTORY:    [ ] Non-smoker  [ ] Smoker  [ ] Alcohol    Allergies    doxycycline (Unknown)  vancomycin (Unknown)  penicillins (Other)    Intolerances    	    REVIEW OF SYSTEMS:  CONSTITUTIONAL: No fever, weight loss, + fatigue  EYES: No eye pain, visual disturbances, or discharge  ENMT:  No difficulty hearing, tinnitus, vertigo; No sinus or throat pain  NECK: No pain or stiffness  RESPIRATORY: No cough, wheezing, chills or hemoptysis; No Shortness of Breath  CARDIOVASCULAR: No chest pain, palpitations, passing out, dizziness, or leg swelling  GASTROINTESTINAL: No abdominal or epigastric pain. No nausea, vomiting, or hematemesis; No diarrhea or constipation. No melena or hematochezia.  GENITOURINARY: No dysuria, frequency, hematuria, or incontinence  NEUROLOGICAL: No headaches, memory loss, loss of strength, numbness, or tremors  SKIN: No itching, burning, rashes, or lesions   LYMPH Nodes: No enlarged glands  ENDOCRINE: No heat or cold intolerance; No hair loss  MUSCULOSKELETAL: + joint pain or swelling; No muscle, back, or extremity pain  PSYCHIATRIC: No depression, anxiety, mood swings, or difficulty sleeping  HEME/LYMPH: No easy bruising, or bleeding gums  ALLERY AND IMMUNOLOGIC: No hives or eczema	    [ ] All others negative	  [ ] Unable to obtain    PHYSICAL EXAM:  T(C): 36.5 (12-10-23 @ 09:13), Max: 36.7 (12-10-23 @ 05:10)  HR: 95 (12-10-23 @ 09:13) (79 - 95)  BP: 99/63 (12-10-23 @ 09:13) (99/63 - 113/72)  RR: 18 (12-10-23 @ 09:13) (18 - 18)  SpO2: 94% (12-10-23 @ 09:13) (93% - 97%)  Wt(kg): --  I&O's Summary    09 Dec 2023 07:01  -  10 Dec 2023 07:00  --------------------------------------------------------  IN: 1110 mL / OUT: 1349 mL / NET: -239 mL    10 Dec 2023 07:01  -  10 Dec 2023 11:33  --------------------------------------------------------  IN: 240 mL / OUT: 750 mL / NET: -510 mL        Appearance: NAD  HEENT:   Dry  oral mucosa, PERRL, EOMI	  Lymphatic: No lymphadenopathy  Cardiovascular: Normal S1 S2, No JVD, + systolic murmurs, No edema  Respiratory: Lungs clear to auscultation	  Psychiatry: A & O x 3, Mood & affect appropriate  Gastrointestinal:  Soft, Non-tender, + BS	  Skin: No rashes, No ecchymoses, No cyanosis	  Neurologic: Non-focal  Extremities: Normal range of motion, No clubbing, cyanosis or edema  Vascular: Peripheral pulses palpable 2+ bilaterally    TELEMETRY: 	    ECG:  	< from: TTE W or WO Ultrasound Enhancing Agent (12.08.23 @ 13:16) >    TRANSTHORACIC ECHOCARDIOGRAM REPORT  ________________________________________________________________________________                                      _______       Pt. Name:       KELSEY HERZOG    Study Date:    12/8/2023  MRN:            [id number]        YOB: 1928  Accession #:    [id number]        Age:           89 years  Account#:       [id number]     Gender:        M  Heart Rate:     92 bpm           Height:        67.00 in (170.18 cm)  Rhythm:         sinus arrhythmia Weight:        148.00 lb (67.13 kg)  Blood Pressure: 110/67 mmHg      BSA/BMI:       1.78 m² / 23.18 kg/m²  ________________________________________________________________________________________  Referring Physician:    [id number] Mil Izquierdo  Interpreting Physician: Devin Thomas M.D.  Primary Sonographer:    Alexandre Roger RDCS    CPT:               ECHO TTE WO CON COMP W DOPP - 16168.m  Indication(s):     Dyspnea, unspecified - R06.00  Procedure:         Transthoracic echocardiogram with 2-D, M-mode and complete                     spectral and color flow Doppler.  Ordering Location: [facility]  Admission Status:  Inpatient  Study Information: Image quality for this study is adequate.    _______________________________________________________________________________________     CONCLUSIONS:      1. Left ventricular cavity is small. Left ventricular wall thickness is normal. Left ventricular systolic function is normal with an ejection fraction of 66 % by Deal's method of disks. There are no regional wall motion abnormalities seen.   2. There is moderate (grade 2) left ventricular diastolic dysfunction, with elevated filling pressure.   3. Normal right ventricular cavity size, wall thickness, and systolic function.   4. A bioprosthetic valve replacement present in the aortic position. Degeneration of the aortic valve prosthesis. The prosthetic aortic valve thickened leaflets. Severe prosthetic aortic stenosis. Mild intravalvular regurgitation.   5. No pericardial effusion seen.   6. Estimated pulmonary artery systolic pressure is 43 mmHg, consistent with mild pulmonary hypertension.   7. Findings were discussed with Dr. Malik on 12/8/2023 at 5.00 pm. No prior echocardiogram is available for comparison.   8. There is normal LV mass and concentric remodeling.    ________________________________________________________________________________________  FINDINGS:     Left Ventricle:  The left ventricular cavity is small. Left ventricular wall thickness is normal. Left ventricular systolic function is normal with a calculated ejection fraction of 66 % by the Deal's biplane method of disks. There are no regional wall motion abnormalities seen. There is moderate (grade 2) left ventricular diastolic dysfunction, with elevated filling pressure. Mild left ventricular hypertrophy. There is normal LV mass and concentric remodeling.     Right Ventricle:  The right ventricular cavity is normal in size, normal wall thickness and normal systolic function. Tricuspid annular plane systolic excursion (TAPSE) is 1.4 cm (normal >=1.7 cm).     Left Atrium:  The left atrium is moderately dilated with an indexed volume of 48.62 ml/m².     Right Atrium:  The right atrium is normal in size with an indexed volume of 26.98 ml/m² and an indexed area of 9.55 cm²/m².     Interatrial Septum:  The interatrial septum appears intact.     Aortic Valve:  A bioprosthetic valve replacement present in the aortic position. The prosthetic valve has reduced leaflet opening. There is degeneration of the aortic valve prosthesis. Echo findings are consistent with thickened leaflets of the aortic prosthesis. There is severe prosthetic aortic stenosis. There is mild intravalvular regurgitation. The aortic valve acceleration time is 111 msec. The peak transaortic velocity is 3.69 m/s, peak transaortic gradient is 54.5 mmHg and mean transaortic gradient is 34.0 mmHg with an LVOT/aortic valve VTI ratio of 0.23. The aortic valve acceleration time is 111 msec. The aortic valve area is estimated at 0.71 cm² by the continuity equation. AI VMax is 4.31 m/s. AI pressure half time is 358 msec. AI slope is 3.53 m/s².     Mitral Valve:  There is moderate calcification of the mitral valve annulus. There is moderate leaflet calcification.There is no mitral valve stenosis. There is mild to moderate mitral regurgitation.     Tricuspid Valve:  Structurally normal tricuspid valve with normal leaflet excursion. There is mild tricuspid regurgitation. Estimated pulmonary artery systolic pressure is 43 mmHg, consistent with mild pulmonary hypertension.     Pulmonic Valve:  Structurally normal pulmonic valve with normal leaflet excursion. There is mild pulmonic regurgitation.     Aorta:  The aortic annulus and aortic root appear normal insize. 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     Pericardium:  No pericardial effusion seen.     Pleura:  Left pleural effusion noted.     Systemic Veins:  The inferior vena cava is dilated measuring 2.45 cm in diameter, (dilated >2.1cm) with normal inspiratory collapse (normal >50%) consistent with mildly elevated right atrial pressure (~8, range 5-10mmHg).    < end of copied text >    RADIOLOGY:  < from: CT Pelvis w/ IV Cont (12.08.23 @ 22:21) >    ACC: [id number] EXAM:  CT PELVIS ONLY IC   ORDERED BY: KEMI BARAHONA     PROCEDURE DATE:  12/08/2023          INTERPRETATION:  INDICATION: Infection    TECHNIQUE: CT of the pelvis with intravenous contrast with axial,   sagittal, and coronal multiplanar reformats. Approximately 90 cc   intravenous Omnipaque 350 was administered.    Comparison:Comparison radiographs dated 12/7/2023    FINDINGS:    Status post right total hip arthroplasty, limiting evaluation due to   streak artifact. There is a nonspecific right hip joint effusion with   surrounding synovitis. There are foci of gas within the right hip joint   space.    There is lucency at the bone-implant interface along the right greater   trochanter, measuring up to 6 mm in width, which may be postoperative or   be related to osteolysis/loosening.    Avascular necrosis of the left femoral head without subchondral collapse.   Mild left hip arthrosis.    Degenerative changes throughout the lower lumbar spine, sacroiliac   joints, and pubic symphysis.    Small rim-enhancing collection within the right thigh adductor   musculature adjacent to the femur (series 10 image 149), measuring 1.5 x   2.2 cm, concerning for abscess.    There is asymmetric thickening of the right psoas muscle with mild   surrounding fat stranding, which may represent associated myositis of the   right iliopsoas muscle given the clinical history. No rim-enhancing   collection is seen within the right psoas muscle.    Multiple bladder stones are noted. Colonic diverticulosis. Prostate is   enlarged. Fat-containing left inguinal hernia.    Atherosclerotic calcifications are noted.    IMPRESSION:    Nonspecific right hip joint effusion with surrounding synovitis, which   may represent septic arthritis in the appropriate clinical setting. Foci   of gas within the right hip joint space may be related to recent   intervention or be related to a gas-forming/necrotizing infection.   Recommend aspiration of the right hip joint space if one has not already   recently been performed.    Lucency at the bone-implant interface along the right greater trochanter,   which may be postoperative or be related to osteolysis/loosening.    Small rim-enhancing collection within the right thigh adductor   musculature adjacentto the femur, concerning for an abscess given the   clinical history.    Asymmetric thickening of the right psoas muscle with mild surrounding fat   stranding, which may represent associated myositis of the right iliopsoas   muscle given the clinicalhistory. No rim-enhancing collection is seen   within the right psoas muscle.    Avascular necrosis of the LEFT femoral head without subchondral collapse.    Multiple bladder stones are noted.    Other findings as above.      --- End of Report ---            KRISTY PRICE M.D., ATTENDING RADIOLOGIST  This document has been electronically signed. Dec  9 2023 11:28AM    < end of copied text >    OTHER: 	  	  LABS:	 	    CARDIAC MARKERS:        Culture - Blood (12.07.23 @ 11:27)   Specimen Source: .Blood Blood-Peripheral  Culture Results:   No growth at 48 HoursCulture - Blood (12.07.23 @ 10:45)   Specimen Source: .Blood Blood-Peripheral  Culture Results:   No growth at 48 HoursCulture - Blood (12.07.23 @ 10:30)   Specimen Source: .Blood Blood-Peripheral  Culture Results:   No growth at 48 Hours                          7.2    10.12 )-----------( 190      ( 10 Dec 2023 07:26 )             23.4     12-10    137  |  102  |  36<H>  ----------------------------<  112<H>  3.6   |  25  |  1.29    Ca    8.6      10 Dec 2023 07:28    TPro  6.2  /  Alb  2.9<L>  /  TBili  0.6  /  DBili  x   /  AST  27  /  ALT  30  /  AlkPhos  192<H>  12-10    proBNP:   Lipid Profile:   HgA1c:   TSH:            CHIEF COMPLAINT:    HISTORY OF PRESENT ILLNESS:  89M with history of HTN, HLD, COPD (on intermittent 2L NC), CAD (no stents), s/p bioprosthetic AVR, CVA in 10/2022, s/p R total hip replacement ~30 years ago, afib/aflutter, hypothyroid, CKD, bladder cancer, and cholangiocarcinoma (s/p treatment, stable) presenting from home with worsening R hip pain. Patient was found to have UTI in 9/2023 and was treated for 1 month. In 10/2023, patient was found to be bacteremic with septic joint on R hip, was treated with 6 week course of IV antibiotics without surgical debridement or operation given age and other comorbidities. Patient was discharged from [place] [facility] to [facility], was able to work with PT appropriately. He finished last dose of IV antibiotics and was discharged home on Tuesday. He was seen by Dr. Darby and was being planned for IV antibiotics but has not started yet. He started having significant pain on the R hip again, now unable to bear weight. No fever or chills. 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      PAST MEDICAL & SURGICAL HISTORY:  Atrial fibrillation      Atrial flutter      CAD (coronary artery disease)      S/P aortic valve replacement with bioprosthetic valve      History of COPD      CVA (cerebrovascular accident)      Hypothyroid      Bladder cancer      Cholangiocarcinoma      S/P hip replacement, right              MEDICATIONS:  apixaban 5 milliGRAM(s) Oral two times a day  clopidogrel Tablet 75 milliGRAM(s) Oral daily  furosemide   Injectable 40 milliGRAM(s) IV Push daily  metoprolol succinate  milliGRAM(s) Oral two times a day    DAPTOmycin IVPB 400 milliGRAM(s) IV Intermittent every 24 hours    albuterol/ipratropium for Nebulization 3 milliLiter(s) Nebulizer every 6 hours  tiotropium 2.5 MICROgram(s) Inhaler 2 Puff(s) Inhalation daily      senna 2 Tablet(s) Oral at bedtime    atorvastatin 40 milliGRAM(s) Oral at bedtime  finasteride 5 milliGRAM(s) Oral daily  levothyroxine 75 MICROGram(s) Oral daily    iron sucrose IVPB 200 milliGRAM(s) IV Intermittent every 24 hours  potassium chloride    Tablet ER 10 milliEquivalent(s) Oral daily  sodium chloride 0.65% Nasal 1 Spray(s) Both Nostrils four times a day PRN  tamsulosin 0.4 milliGRAM(s) Oral at bedtime      FAMILY HISTORY:      SOCIAL HISTORY:    [ ] Non-smoker  [ ] Smoker  [ ] Alcohol    Allergies    doxycycline (Unknown)  vancomycin (Unknown)  penicillins (Other)    Intolerances    	    REVIEW OF SYSTEMS:  CONSTITUTIONAL: No fever, weight loss, + fatigue  EYES: No eye pain, visual disturbances, or discharge  ENMT:  No difficulty hearing, tinnitus, vertigo; No sinus or throat pain  NECK: No pain or stiffness  RESPIRATORY: No cough, wheezing, chills or hemoptysis; No Shortness of Breath  CARDIOVASCULAR: No chest pain, palpitations, passing out, dizziness, or leg swelling  GASTROINTESTINAL: No abdominal or epigastric pain. No nausea, vomiting, or hematemesis; No diarrhea or constipation. No melena or hematochezia.  GENITOURINARY: No dysuria, frequency, hematuria, or incontinence  NEUROLOGICAL: No headaches, memory loss, loss of strength, numbness, or tremors  SKIN: No itching, burning, rashes, or lesions   LYMPH Nodes: No enlarged glands  ENDOCRINE: No heat or cold intolerance; No hair loss  MUSCULOSKELETAL: + joint pain or swelling; No muscle, back, or extremity pain  PSYCHIATRIC: No depression, anxiety, mood swings, or difficulty sleeping  HEME/LYMPH: No easy bruising, or bleeding gums  ALLERY AND IMMUNOLOGIC: No hives or eczema	    [ ] All others negative	  [ ] Unable to obtain    PHYSICAL EXAM:  T(C): 36.5 (12-10-23 @ 09:13), Max: 36.7 (12-10-23 @ 05:10)  HR: 95 (12-10-23 @ 09:13) (79 - 95)  BP: 99/63 (12-10-23 @ 09:13) (99/63 - 113/72)  RR: 18 (12-10-23 @ 09:13) (18 - 18)  SpO2: 94% (12-10-23 @ 09:13) (93% - 97%)  Wt(kg): --  I&O's Summary    09 Dec 2023 07:01  -  10 Dec 2023 07:00  --------------------------------------------------------  IN: 1110 mL / OUT: 1349 mL / NET: -239 mL    10 Dec 2023 07:01  -  10 Dec 2023 11:33  --------------------------------------------------------  IN: 240 mL / OUT: 750 mL / NET: -510 mL        Appearance: NAD  HEENT:   Dry  oral mucosa, PERRL, EOMI	  Lymphatic: No lymphadenopathy  Cardiovascular: Normal S1 S2, No JVD, + systolic murmurs, No edema  Respiratory: Lungs clear to auscultation	  Psychiatry: A & O x 3, Mood & affect appropriate  Gastrointestinal:  Soft, Non-tender, + BS	  Skin: No rashes, No ecchymoses, No cyanosis	  Neurologic: Non-focal  Extremities: Normal range of motion, No clubbing, cyanosis or edema  Vascular: Peripheral pulses palpable 2+ bilaterally    TELEMETRY: 	    ECG:  	< from: TTE W or WO Ultrasound Enhancing Agent (12.08.23 @ 13:16) >    TRANSTHORACIC ECHOCARDIOGRAM REPORT  ________________________________________________________________________________                                      _______       Pt. Name:       KELSEY HERZOG    Study Date:    12/8/2023  MRN:            [id number]        YOB: 1928  Accession #:    [id number]        Age:           89 years  Account#:       [id number]     Gender:        M  Heart Rate:     92 bpm           Height:        67.00 in (170.18 cm)  Rhythm:         sinus arrhythmia Weight:        148.00 lb (67.13 kg)  Blood Pressure: 110/67 mmHg      BSA/BMI:       1.78 m² / 23.18 kg/m²  ________________________________________________________________________________________  Referring Physician:    [id number] Mil Izquierdo  Interpreting Physician: Devin Thomas M.D.  Primary Sonographer:    Alexandre Roger RDCS    CPT:               ECHO TTE WO CON COMP W DOPP - 10756.m  Indication(s):     Dyspnea, unspecified - R06.00  Procedure:         Transthoracic echocardiogram with 2-D, M-mode and complete                     spectral and color flow Doppler.  Ordering Location: [facility]  Admission Status:  Inpatient  Study Information: Image quality for this study is adequate.    _______________________________________________________________________________________     CONCLUSIONS:      1. Left ventricular cavity is small. Left ventricular wall thickness is normal. Left ventricular systolic function is normal with an ejection fraction of 66 % by Deal's method of disks. There are no regional wall motion abnormalities seen.   2. There is moderate (grade 2) left ventricular diastolic dysfunction, with elevated filling pressure.   3. Normal right ventricular cavity size, wall thickness, and systolic function.   4. A bioprosthetic valve replacement present in the aortic position. Degeneration of the aortic valve prosthesis. The prosthetic aortic valve thickened leaflets. Severe prosthetic aortic stenosis. Mild intravalvular regurgitation.   5. No pericardial effusion seen.   6. Estimated pulmonary artery systolic pressure is 43 mmHg, consistent with mild pulmonary hypertension.   7. Findings were discussed with Dr. Malik on 12/8/2023 at 5.00 pm. No prior echocardiogram is available for comparison.   8. There is normal LV mass and concentric remodeling.    ________________________________________________________________________________________  FINDINGS:     Left Ventricle:  The left ventricular cavity is small. Left ventricular wall thickness is normal. Left ventricular systolic function is normal with a calculated ejection fraction of 66 % by the Deal's biplane method of disks. There are no regional wall motion abnormalities seen. There is moderate (grade 2) left ventricular diastolic dysfunction, with elevated filling pressure. Mild left ventricular hypertrophy. There is normal LV mass and concentric remodeling.     Right Ventricle:  The right ventricular cavity is normal in size, normal wall thickness and normal systolic function. Tricuspid annular plane systolic excursion (TAPSE) is 1.4 cm (normal >=1.7 cm).     Left Atrium:  The left atrium is moderately dilated with an indexed volume of 48.62 ml/m².     Right Atrium:  The right atrium is normal in size with an indexed volume of 26.98 ml/m² and an indexed area of 9.55 cm²/m².     Interatrial Septum:  The interatrial septum appears intact.     Aortic Valve:  A bioprosthetic valve replacement present in the aortic position. The prosthetic valve has reduced leaflet opening. There is degeneration of the aortic valve prosthesis. Echo findings are consistent with thickened leaflets of the aortic prosthesis. There is severe prosthetic aortic stenosis. There is mild intravalvular regurgitation. The aortic valve acceleration time is 111 msec. The peak transaortic velocity is 3.69 m/s, peak transaortic gradient is 54.5 mmHg and mean transaortic gradient is 34.0 mmHg with an LVOT/aortic valve VTI ratio of 0.23. The aortic valve acceleration time is 111 msec. The aortic valve area is estimated at 0.71 cm² by the continuity equation. AI VMax is 4.31 m/s. AI pressure half time is 358 msec. AI slope is 3.53 m/s².     Mitral Valve:  There is moderate calcification of the mitral valve annulus. There is moderate leaflet calcification.There is no mitral valve stenosis. There is mild to moderate mitral regurgitation.     Tricuspid Valve:  Structurally normal tricuspid valve with normal leaflet excursion. There is mild tricuspid regurgitation. Estimated pulmonary artery systolic pressure is 43 mmHg, consistent with mild pulmonary hypertension.     Pulmonic Valve:  Structurally normal pulmonic valve with normal leaflet excursion. There is mild pulmonic regurgitation.     Aorta:  The aortic annulus and aortic root appear normal insize. 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     Pericardium:  No pericardial effusion seen.     Pleura:  Left pleural effusion noted.     Systemic Veins:  The inferior vena cava is dilated measuring 2.45 cm in diameter, (dilated >2.1cm) with normal inspiratory collapse (normal >50%) consistent with mildly elevated right atrial pressure (~8, range 5-10mmHg).    < end of copied text >    RADIOLOGY:  < from: CT Pelvis w/ IV Cont (12.08.23 @ 22:21) >    ACC: [id number] EXAM:  CT PELVIS ONLY IC   ORDERED BY: KEMI BARAHONA     PROCEDURE DATE:  12/08/2023          INTERPRETATION:  INDICATION: Infection    TECHNIQUE: CT of the pelvis with intravenous contrast with axial,   sagittal, and coronal multiplanar reformats. Approximately 90 cc   intravenous Omnipaque 350 was administered.    Comparison:Comparison radiographs dated 12/7/2023    FINDINGS:    Status post right total hip arthroplasty, limiting evaluation due to   streak artifact. There is a nonspecific right hip joint effusion with   surrounding synovitis. There are foci of gas within the right hip joint   space.    There is lucency at the bone-implant interface along the right greater   trochanter, measuring up to 6 mm in width, which may be postoperative or   be related to osteolysis/loosening.    Avascular necrosis of the left femoral head without subchondral collapse.   Mild left hip arthrosis.    Degenerative changes throughout the lower lumbar spine, sacroiliac   joints, and pubic symphysis.    Small rim-enhancing collection within the right thigh adductor   musculature adjacent to the femur (series 10 image 149), measuring 1.5 x   2.2 cm, concerning for abscess.    There is asymmetric thickening of the right psoas muscle with mild   surrounding fat stranding, which may represent associated myositis of the   right iliopsoas muscle given the clinical history. No rim-enhancing   collection is seen within the right psoas muscle.    Multiple bladder stones are noted. Colonic diverticulosis. Prostate is   enlarged. Fat-containing left inguinal hernia.    Atherosclerotic calcifications are noted.    IMPRESSION:    Nonspecific right hip joint effusion with surrounding synovitis, which   may represent septic arthritis in the appropriate clinical setting. Foci   of gas within the right hip joint space may be related to recent   intervention or be related to a gas-forming/necrotizing infection.   Recommend aspiration of the right hip joint space if one has not already   recently been performed.    Lucency at the bone-implant interface along the right greater trochanter,   which may be postoperative or be related to osteolysis/loosening.    Small rim-enhancing collection within the right thigh adductor   musculature adjacentto the femur, concerning for an abscess given the   clinical history.    Asymmetric thickening of the right psoas muscle with mild surrounding fat   stranding, which may represent associated myositis of the right iliopsoas   muscle given the clinicalhistory. No rim-enhancing collection is seen   within the right psoas muscle.    Avascular necrosis of the LEFT femoral head without subchondral collapse.    Multiple bladder stones are noted.    Other findings as above.      --- End of Report ---            KRISTY PRICE M.D., ATTENDING RADIOLOGIST  This document has been electronically signed. Dec  9 2023 11:28AM    < end of copied text >    OTHER: 	  	  LABS:	 	    CARDIAC MARKERS:        Culture - Blood (12.07.23 @ 11:27)   Specimen Source: .Blood Blood-Peripheral  Culture Results:   No growth at 48 HoursCulture - Blood (12.07.23 @ 10:45)   Specimen Source: .Blood Blood-Peripheral  Culture Results:   No growth at 48 HoursCulture - Blood (12.07.23 @ 10:30)   Specimen Source: .Blood Blood-Peripheral  Culture Results:   No growth at 48 Hours                          7.2    10.12 )-----------( 190      ( 10 Dec 2023 07:26 )             23.4     12-10    137  |  102  |  36<H>  ----------------------------<  112<H>  3.6   |  25  |  1.29    Ca    8.6      10 Dec 2023 07:28    TPro  6.2  /  Alb  2.9<L>  /  TBili  0.6  /  DBili  x   /  AST  27  /  ALT  30  /  AlkPhos  192<H>  12-10    proBNP:   Lipid Profile:   HgA1c:   TSH:

## 2023-12-11 LAB
ANION GAP SERPL CALC-SCNC: 11 MMOL/L — SIGNIFICANT CHANGE UP (ref 5–17)
ANION GAP SERPL CALC-SCNC: 11 MMOL/L — SIGNIFICANT CHANGE UP (ref 5–17)
BLD GP AB SCN SERPL QL: NEGATIVE — SIGNIFICANT CHANGE UP
BLD GP AB SCN SERPL QL: NEGATIVE — SIGNIFICANT CHANGE UP
BUN SERPL-MCNC: 35 MG/DL — HIGH (ref 7–23)
BUN SERPL-MCNC: 35 MG/DL — HIGH (ref 7–23)
CALCIUM SERPL-MCNC: 8.7 MG/DL — SIGNIFICANT CHANGE UP (ref 8.4–10.5)
CALCIUM SERPL-MCNC: 8.7 MG/DL — SIGNIFICANT CHANGE UP (ref 8.4–10.5)
CHLORIDE SERPL-SCNC: 104 MMOL/L — SIGNIFICANT CHANGE UP (ref 96–108)
CHLORIDE SERPL-SCNC: 104 MMOL/L — SIGNIFICANT CHANGE UP (ref 96–108)
CO2 SERPL-SCNC: 24 MMOL/L — SIGNIFICANT CHANGE UP (ref 22–31)
CO2 SERPL-SCNC: 24 MMOL/L — SIGNIFICANT CHANGE UP (ref 22–31)
CREAT SERPL-MCNC: 1.28 MG/DL — SIGNIFICANT CHANGE UP (ref 0.5–1.3)
CREAT SERPL-MCNC: 1.28 MG/DL — SIGNIFICANT CHANGE UP (ref 0.5–1.3)
EGFR: 52 ML/MIN/1.73M2 — LOW
EGFR: 52 ML/MIN/1.73M2 — LOW
GLUCOSE SERPL-MCNC: 105 MG/DL — HIGH (ref 70–99)
GLUCOSE SERPL-MCNC: 105 MG/DL — HIGH (ref 70–99)
HCT VFR BLD CALC: 24.5 % — LOW (ref 39–50)
HCT VFR BLD CALC: 24.5 % — LOW (ref 39–50)
HGB BLD-MCNC: 7.4 G/DL — LOW (ref 13–17)
HGB BLD-MCNC: 7.4 G/DL — LOW (ref 13–17)
MAGNESIUM SERPL-MCNC: 2 MG/DL — SIGNIFICANT CHANGE UP (ref 1.6–2.6)
MAGNESIUM SERPL-MCNC: 2 MG/DL — SIGNIFICANT CHANGE UP (ref 1.6–2.6)
MCHC RBC-ENTMCNC: 30.2 GM/DL — LOW (ref 32–36)
MCHC RBC-ENTMCNC: 30.2 GM/DL — LOW (ref 32–36)
MCHC RBC-ENTMCNC: 30.5 PG — SIGNIFICANT CHANGE UP (ref 27–34)
MCHC RBC-ENTMCNC: 30.5 PG — SIGNIFICANT CHANGE UP (ref 27–34)
MCV RBC AUTO: 100.8 FL — HIGH (ref 80–100)
MCV RBC AUTO: 100.8 FL — HIGH (ref 80–100)
NRBC # BLD: 0 /100 WBCS — SIGNIFICANT CHANGE UP (ref 0–0)
NRBC # BLD: 0 /100 WBCS — SIGNIFICANT CHANGE UP (ref 0–0)
PHOSPHATE SERPL-MCNC: 2.9 MG/DL — SIGNIFICANT CHANGE UP (ref 2.5–4.5)
PHOSPHATE SERPL-MCNC: 2.9 MG/DL — SIGNIFICANT CHANGE UP (ref 2.5–4.5)
PLATELET # BLD AUTO: 186 K/UL — SIGNIFICANT CHANGE UP (ref 150–400)
PLATELET # BLD AUTO: 186 K/UL — SIGNIFICANT CHANGE UP (ref 150–400)
POTASSIUM SERPL-MCNC: 3.9 MMOL/L — SIGNIFICANT CHANGE UP (ref 3.5–5.3)
POTASSIUM SERPL-MCNC: 3.9 MMOL/L — SIGNIFICANT CHANGE UP (ref 3.5–5.3)
POTASSIUM SERPL-SCNC: 3.9 MMOL/L — SIGNIFICANT CHANGE UP (ref 3.5–5.3)
POTASSIUM SERPL-SCNC: 3.9 MMOL/L — SIGNIFICANT CHANGE UP (ref 3.5–5.3)
RBC # BLD: 2.43 M/UL — LOW (ref 4.2–5.8)
RBC # BLD: 2.43 M/UL — LOW (ref 4.2–5.8)
RBC # FLD: 16.5 % — HIGH (ref 10.3–14.5)
RBC # FLD: 16.5 % — HIGH (ref 10.3–14.5)
RH IG SCN BLD-IMP: POSITIVE — SIGNIFICANT CHANGE UP
RH IG SCN BLD-IMP: POSITIVE — SIGNIFICANT CHANGE UP
SODIUM SERPL-SCNC: 139 MMOL/L — SIGNIFICANT CHANGE UP (ref 135–145)
SODIUM SERPL-SCNC: 139 MMOL/L — SIGNIFICANT CHANGE UP (ref 135–145)
WBC # BLD: 9.98 K/UL — SIGNIFICANT CHANGE UP (ref 3.8–10.5)
WBC # BLD: 9.98 K/UL — SIGNIFICANT CHANGE UP (ref 3.8–10.5)
WBC # FLD AUTO: 9.98 K/UL — SIGNIFICANT CHANGE UP (ref 3.8–10.5)
WBC # FLD AUTO: 9.98 K/UL — SIGNIFICANT CHANGE UP (ref 3.8–10.5)

## 2023-12-11 PROCEDURE — 99232 SBSQ HOSP IP/OBS MODERATE 35: CPT

## 2023-12-11 RX ORDER — TRAMADOL HYDROCHLORIDE 50 MG/1
25 TABLET ORAL EVERY 6 HOURS
Refills: 0 | Status: DISCONTINUED | OUTPATIENT
Start: 2023-12-11 | End: 2023-12-12

## 2023-12-11 RX ORDER — TRAMADOL HYDROCHLORIDE 50 MG/1
50 TABLET ORAL EVERY 6 HOURS
Refills: 0 | Status: DISCONTINUED | OUTPATIENT
Start: 2023-12-11 | End: 2023-12-11

## 2023-12-11 RX ORDER — MINOCYCLINE HYDROCHLORIDE 45 MG/1
100 TABLET, EXTENDED RELEASE ORAL EVERY 12 HOURS
Refills: 0 | Status: DISCONTINUED | OUTPATIENT
Start: 2023-12-11 | End: 2023-12-12

## 2023-12-11 RX ORDER — APIXABAN 2.5 MG/1
2.5 TABLET, FILM COATED ORAL
Refills: 0 | Status: DISCONTINUED | OUTPATIENT
Start: 2023-12-11 | End: 2023-12-18

## 2023-12-11 RX ADMIN — TIOTROPIUM BROMIDE 2 PUFF(S): 18 CAPSULE ORAL; RESPIRATORY (INHALATION) at 12:28

## 2023-12-11 RX ADMIN — Medication 75 MICROGRAM(S): at 05:11

## 2023-12-11 RX ADMIN — Medication 3 MILLILITER(S): at 12:28

## 2023-12-11 RX ADMIN — Medication 100 MILLIGRAM(S): at 09:09

## 2023-12-11 RX ADMIN — Medication 3 MILLILITER(S): at 23:44

## 2023-12-11 RX ADMIN — MINOCYCLINE HYDROCHLORIDE 100 MILLIGRAM(S): 45 TABLET, EXTENDED RELEASE ORAL at 14:34

## 2023-12-11 RX ADMIN — Medication 100 MILLIGRAM(S): at 18:03

## 2023-12-11 RX ADMIN — APIXABAN 2.5 MILLIGRAM(S): 2.5 TABLET, FILM COATED ORAL at 18:03

## 2023-12-11 RX ADMIN — Medication 10 MILLIEQUIVALENT(S): at 12:29

## 2023-12-11 RX ADMIN — FINASTERIDE 5 MILLIGRAM(S): 5 TABLET, FILM COATED ORAL at 12:28

## 2023-12-11 RX ADMIN — ATORVASTATIN CALCIUM 40 MILLIGRAM(S): 80 TABLET, FILM COATED ORAL at 21:22

## 2023-12-11 RX ADMIN — Medication 3 MILLILITER(S): at 18:02

## 2023-12-11 RX ADMIN — Medication 3 MILLILITER(S): at 05:11

## 2023-12-11 RX ADMIN — SENNA PLUS 2 TABLET(S): 8.6 TABLET ORAL at 21:22

## 2023-12-11 RX ADMIN — TAMSULOSIN HYDROCHLORIDE 0.4 MILLIGRAM(S): 0.4 CAPSULE ORAL at 21:22

## 2023-12-11 RX ADMIN — APIXABAN 5 MILLIGRAM(S): 2.5 TABLET, FILM COATED ORAL at 05:10

## 2023-12-11 RX ADMIN — Medication 40 MILLIGRAM(S): at 05:11

## 2023-12-11 NOTE — PROGRESS NOTE ADULT - SUBJECTIVE AND OBJECTIVE BOX
infectious diseases progress note:    Patient is a 89y old  Male who presents with a chief complaint of hip pain (08 Dec 2023 09:11)        Pain of right hip               Allergies    doxycycline (Unknown)  vancomycin (Unknown)  penicillins (Other)    Intolerances        ANTIBIOTICS/RELEVANT:  antimicrobials  DAPTOmycin IVPB 400 milliGRAM(s) IV Intermittent every 24 hours    immunologic:    OTHER:  albuterol/ipratropium for Nebulization 3 milliLiter(s) Nebulizer every 6 hours  apixaban 5 milliGRAM(s) Oral two times a day  atorvastatin 40 milliGRAM(s) Oral at bedtime  clopidogrel Tablet 75 milliGRAM(s) Oral daily  finasteride 5 milliGRAM(s) Oral daily  furosemide   Injectable 40 milliGRAM(s) IV Push daily  levothyroxine 75 MICROGram(s) Oral daily  metoprolol succinate  milliGRAM(s) Oral two times a day  potassium chloride    Tablet ER 10 milliEquivalent(s) Oral daily  senna 2 Tablet(s) Oral at bedtime  sodium chloride 0.65% Nasal 1 Spray(s) Both Nostrils four times a day PRN  tamsulosin 0.4 milliGRAM(s) Oral at bedtime  tiotropium 2.5 MICROgram(s) Inhaler 2 Puff(s) Inhalation daily      Objective:  Vital Signs Last 24 Hrs  T(C): 36.3 (11 Dec 2023 08:59), Max: 36.8 (11 Dec 2023 04:03)  T(F): 97.3 (11 Dec 2023 08:59), Max: 98.2 (11 Dec 2023 04:03)  HR: 82 (11 Dec 2023 08:59) (76 - 86)  BP: 108/70 (11 Dec 2023 08:59) (97/60 - 111/64)  BP(mean): --  RR: 18 (11 Dec 2023 08:59) (18 - 18)  SpO2: 97% (11 Dec 2023 08:59) (96% - 97%)    Parameters below as of 11 Dec 2023 08:59  Patient On (Oxygen Delivery Method): room air           Eyes:BELINDA, EOMI  Ear/Nose/Throat: no oral lesion, no sinus tenderness on percussion	  Neck:no JVD, no lymphadenopathy, supple  Respiratory: CTA aravind  Cardiovascular: S1S2 RRR, no murmurs  Gastrointestinal:soft, (+) BS, no HSM  Extremities:no e/e/c        LABS:                        7.4    9.98  )-----------( 186      ( 11 Dec 2023 06:14 )             24.5     12-11    139  |  104  |  35<H>  ----------------------------<  105<H>  3.9   |  24  |  1.28    Ca    8.7      11 Dec 2023 06:14  Phos  2.9     12-11  Mg     2.0     12-11    TPro  6.2  /  Alb  2.9<L>  /  TBili  0.6  /  DBili  x   /  AST  27  /  ALT  30  /  AlkPhos  192<H>  12-10      Urinalysis Basic - ( 11 Dec 2023 06:14 )    Color: x / Appearance: x / SG: x / pH: x  Gluc: 105 mg/dL / Ketone: x  / Bili: x / Urobili: x   Blood: x / Protein: x / Nitrite: x   Leuk Esterase: x / RBC: x / WBC x   Sq Epi: x / Non Sq Epi: x / Bacteria: x          MICROBIOLOGY:    RECENT CULTURES:  12-07 @ 11:27 .Blood Blood-Peripheral                No growth at 72 Hours    12-07 @ 10:45 .Blood Blood-Peripheral                No growth at 72 Hours    12-07 @ 10:30 .Blood Blood-Peripheral                No growth at 72 Hours          RESPIRATORY CULTURES:              RADIOLOGY & ADDITIONAL STUDIES:        Pager [phone]  After 5 pm/weekends or if no response :[phone] infectious diseases progress note:    Patient is a 89y old  Male who presents with a chief complaint of hip pain (08 Dec 2023 09:11)        Pain of right hip               Allergies    doxycycline (Unknown)  vancomycin (Unknown)  penicillins (Other)    Intolerances        ANTIBIOTICS/RELEVANT:  antimicrobials  DAPTOmycin IVPB 400 milliGRAM(s) IV Intermittent every 24 hours    immunologic:    OTHER:  albuterol/ipratropium for Nebulization 3 milliLiter(s) Nebulizer every 6 hours  apixaban 5 milliGRAM(s) Oral two times a day  atorvastatin 40 milliGRAM(s) Oral at bedtime  clopidogrel Tablet 75 milliGRAM(s) Oral daily  finasteride 5 milliGRAM(s) Oral daily  furosemide   Injectable 40 milliGRAM(s) IV Push daily  levothyroxine 75 MICROGram(s) Oral daily  metoprolol succinate  milliGRAM(s) Oral two times a day  potassium chloride    Tablet ER 10 milliEquivalent(s) Oral daily  senna 2 Tablet(s) Oral at bedtime  sodium chloride 0.65% Nasal 1 Spray(s) Both Nostrils four times a day PRN  tamsulosin 0.4 milliGRAM(s) Oral at bedtime  tiotropium 2.5 MICROgram(s) Inhaler 2 Puff(s) Inhalation daily      Objective:  Vital Signs Last 24 Hrs  T(C): 36.3 (11 Dec 2023 08:59), Max: 36.8 (11 Dec 2023 04:03)  T(F): 97.3 (11 Dec 2023 08:59), Max: 98.2 (11 Dec 2023 04:03)  HR: 82 (11 Dec 2023 08:59) (76 - 86)  BP: 108/70 (11 Dec 2023 08:59) (97/60 - 111/64)  BP(mean): --  RR: 18 (11 Dec 2023 08:59) (18 - 18)  SpO2: 97% (11 Dec 2023 08:59) (96% - 97%)    Parameters below as of 11 Dec 2023 08:59  Patient On (Oxygen Delivery Method): room air           Eyes:BELINDA, EOMI  Ear/Nose/Throat: no oral lesion, no sinus tenderness on percussion	  Neck:no JVD, no lymphadenopathy, supple  Respiratory: CTA aravind  Cardiovascular: S1S2 RRR, no murmurs  Gastrointestinal:soft, (+) BS, no HSM  Extremities:no e/e/c        LABS:                        7.4    9.98  )-----------( 186      ( 11 Dec 2023 06:14 )             24.5     12-11    139  |  104  |  35<H>  ----------------------------<  105<H>  3.9   |  24  |  1.28    Ca    8.7      11 Dec 2023 06:14  Phos  2.9     12-11  Mg     2.0     12-11    TPro  6.2  /  Alb  2.9<L>  /  TBili  0.6  /  DBili  x   /  AST  27  /  ALT  30  /  AlkPhos  192<H>  12-10      Urinalysis Basic - ( 11 Dec 2023 06:14 )    Color: x / Appearance: x / SG: x / pH: x  Gluc: 105 mg/dL / Ketone: x  / Bili: x / Urobili: x   Blood: x / Protein: x / Nitrite: x   Leuk Esterase: x / RBC: x / WBC x   Sq Epi: x / Non Sq Epi: x / Bacteria: x          MICROBIOLOGY:    RECENT CULTURES:  12-07 @ 11:27 .Blood Blood-Peripheral                No growth at 72 Hours    12-07 @ 10:45 .Blood Blood-Peripheral                No growth at 72 Hours    12-07 @ 10:30 .Blood Blood-Peripheral                No growth at 72 Hours          RESPIRATORY CULTURES:              RADIOLOGY & ADDITIONAL STUDIES:        Pager [phone]  After 5 pm/weekends or if no response :[phone]

## 2023-12-11 NOTE — PROGRESS NOTE ADULT - ASSESSMENT
89 year old with multiple medical problems including    AVR  CVA  CAD  Bladder cancer  Cholangiocarcinoma  Rp hip replacement many yrs ago    pt had a rt THR infection about 10 years ago that was treated with debridement and IV ab and improved     Recently presented to  with pain and was found to have BC and Hip aspirate culture positive S. epidermidis   pt was not treated surgically  He received a 6 week course of daptomycin that was completed on Monday and was discharged from [facility]  I saw him in office and was going to  put him on suppressive po minocycline which the S epi was sensitive   Prior to getting the minocycline, he developed pain in the right hip and represented to the  ER    no fever or chills  no malaise but pain and inability to walk with out more pain    The patients hip pain is almost certainly from ongoing infection of the THR  Due to his age and comorbidities surgery was deferred and a MARGARETTE was not done   pain i sbetter after 24 hr of ab     daptomycin 6 mg/kg q day   CT scan reviewed and discussed case with ortho Dr. Vickers   surgery is a difficult option nand family is not anxious  I think minocycline is the po option and despite hs of a allergy   ' i think we should challenge him in the hospital s there are no other good options      89 year old with multiple medical problems including    AVR  CVA  CAD  Bladder cancer  Cholangiocarcinoma  Rp hip replacement many yrs ago    pt had a rt THR infection about 10 years ago that was treated with debridement and IV ab and improved     Recently presented to  with pain and was found to have BC and Hip aspirate culture positive S. epidermidis   pt was not treated surgically  He received a 6 week course of daptomycin that was completed on Monday and was discharged from [facility]  I saw him in office and was going to  put him on suppressive po minocycline which the S epi was sensitive   Prior to getting the minocycline, he developed pain in the right hip and represented to the  ER    no fever or chills  no malaise but pain and inability to walk with out more pain    The patients hip pain is almost certainly from ongoing infection of the THR  Due to his age and comorbidities surgery was deferred and a MARGARETTE was not done   pain i sbetter after 24 hr of ab     daptomycin 6 mg/kg q day   CT scan reviewed and discussed case with ortho Dr. Vickers   surgery is a difficult option nand family is not anxious  I think minocycline is the po option and despite hs of a allergy   ' i think we should challenge him in the hospital s there are no other good options

## 2023-12-11 NOTE — PROGRESS NOTE ADULT - ASSESSMENT
89M with history of HTN, HLD, COPD (on intermittent 2L NC), CAD (no stents), s/p bioprosthetic AVR, CVA in 10/2022, s/p R total hip replacement ~30 years ago, afib/aflutter, hypothyroid, CKD, bladder cancer, and cholangiocarcinoma (s/p treatment, stable) presenting from home with worsening R hip pain. Accompanied by granddaughter at bedside. Patient was found to have UTI in 9/2023 and was treated for 1 month. In 10/2023, patient was found to be bacteremic with septic joint on R hip, was treated with 6 week course of IV antibiotics without surgical debridement or operation given age and other comorbidities. Patient was discharged from [place] to [facility], was able to work with PT appropriately. He finished last dose of IV antibiotics and was discharged home on Tuesday. He was seen by Dr. Redding in clinic yesterday and was being planned for IV antibiotics but has not started yet. He started having significant pain on the R hip again since discharge, now unable to bear weight. No fever or chills. No falls or trauma    right hip infection  - Blood cultures times 2  - daptomycin 6 mg/kg q day  - change to po minocycline   - ID consult appreciated  - CT of right hip done  - no plans for surgery or drainage  - ID following    aravind lower ext edema with dyspnea  - CXR  - check doppler  - iv lasix 40 qd    severe prosthetic aortic valve stenosis  - cardio consult following    anemia  - iron studies c/w iron def  - FOBT  - iv iron  - transfuse 1 unit    afib , AVR  - c/w metoprolol  - c/w eliquis 2.5 bid    CAD  - c/w plavix    COPD  - c/w inhalers    BPH  - flomax and finasteride    HLD  - c/w lipitor    dvt px  - on eliquis                  89M with history of HTN, HLD, COPD (on intermittent 2L NC), CAD (no stents), s/p bioprosthetic AVR, CVA in 10/2022, s/p R total hip replacement ~30 years ago, afib/aflutter, hypothyroid, CKD, bladder cancer, and cholangiocarcinoma (s/p treatment, stable) presenting from home with worsening R hip pain. Accompanied by granddaughter at bedside. Patient was found to have UTI in 9/2023 and was treated for 1 month. In 10/2023, patient was found to be bacteremic with septic joint on R hip, was treated with 6 week course of IV antibiotics without surgical debridement or operation given age and other comorbidities. Patient was discharged from [place] to [facility], was able to work with PT appropriately. He finished last dose of IV antibiotics and was discharged home on Tuesday. He was seen by Dr. Redding in clinic yesterday and was being planned for IV antibiotics but has not started yet. He started having significant pain on the R hip again since discharge, now unable to bear weight. No fever or chills. No falls or trauma    right hip infection  - Blood cultures times 2  - daptomycin 6 mg/kg q day  - change to po minocycline   - ID consult appreciated  - CT of right hip done  - no plans for surgery or drainage  - ID following    aravind lower ext edema with dyspnea  - CXR  - check doppler  - iv lasix 40 qd    severe prosthetic aortic valve stenosis  - cardio consult following    anemia  - iron studies c/w iron def  - FOBT  - iv iron  - transfuse 1 unit    afib , AVR  - c/w metoprolol  - c/w eliquis 2.5 bid    CAD  - c/w plavix    COPD  - c/w inhalers    BPH  - flomax and finasteride    HLD  - c/w lipitor    dvt px  - on eliquis                  89M with history of HTN, HLD, COPD (on intermittent 2L NC), CAD (no stents), s/p bioprosthetic AVR, CVA in 10/2022, s/p R total hip replacement ~30 years ago, afib/aflutter, hypothyroid, CKD, bladder cancer, and cholangiocarcinoma (s/p treatment, stable) presenting from home with worsening R hip pain. Accompanied by granddaughter at bedside. Patient was found to have UTI in 9/2023 and was treated for 1 month. In 10/2023, patient was found to be bacteremic with septic joint on R hip, was treated with 6 week course of IV antibiotics without surgical debridement or operation given age and other comorbidities. Patient was discharged from [place] to [facility], was able to work with PT appropriately. He finished last dose of IV antibiotics and was discharged home on Tuesday. He was seen by Dr. Redding in clinic yesterday and was being planned for IV antibiotics but has not started yet. He started having significant pain on the R hip again since discharge, now unable to bear weight. No fever or chills. No falls or trauma    right hip infection  - Blood cultures times 2  - daptomycin 6 mg/kg q day  - change to po minocycline   - ID consult appreciated  - CT of right hip done  - no plans for surgery or drainage  - ID following    aravind lower ext edema with dyspnea  - CXR  - check doppler  - iv lasix 40 qd    severe prosthetic aortic valve stenosis  - cardio consult following    anemia  - iron studies c/w iron def  - FOBT  - iv iron  - transfuse 1 unit    afib , AVR  - c/w metoprolol  - c/w eliquis 2.5 bid    CAD  - c/w plavix    COPD  - c/w inhalers    BPH  - flomax and finasteride    HLD  - c/w lipitor    dvt px  - on eliquis    d/w daughter and son in law at great length  time spent 55 minutes                  89M with history of HTN, HLD, COPD (on intermittent 2L NC), CAD (no stents), s/p bioprosthetic AVR, CVA in 10/2022, s/p R total hip replacement ~30 years ago, afib/aflutter, hypothyroid, CKD, bladder cancer, and cholangiocarcinoma (s/p treatment, stable) presenting from home with worsening R hip pain. Accompanied by granddaughter at bedside. Patient was found to have UTI in 9/2023 and was treated for 1 month. In 10/2023, patient was found to be bacteremic with septic joint on R hip, was treated with 6 week course of IV antibiotics without surgical debridement or operation given age and other comorbidities. Patient was discharged from [place] to [facility], was able to work with PT appropriately. He finished last dose of IV antibiotics and was discharged home on Tuesday. He was seen by Dr. Redding in clinic yesterday and was being planned for IV antibiotics but has not started yet. He started having significant pain on the R hip again since discharge, now unable to bear weight. No fever or chills. No falls or trauma    right hip infection  - Blood cultures times 2  - daptomycin 6 mg/kg q day  - change to po minocycline   - ID consult appreciated  - CT of right hip done  - no plans for surgery or drainage  - ID following    aravind lower ext edema with dyspnea  - CXR  - check doppler  - iv lasix 40 qd    severe prosthetic aortic valve stenosis  - cardio consult following    anemia  - iron studies c/w iron def  - FOBT  - iv iron  - transfuse 1 unit    afib , AVR  - c/w metoprolol  - c/w eliquis 2.5 bid    CAD  - c/w plavix    COPD  - c/w inhalers    BPH  - flomax and finasteride    HLD  - c/w lipitor    dvt px  - on eliquis    d/w daughter and son in law at great length  time spent 55 minutes

## 2023-12-11 NOTE — PROGRESS NOTE ADULT - ASSESSMENT
89M with history of HTN, HLD, COPD (on intermittent 2L NC), CAD (no stents), s/p bioprosthetic AVR, CVA in 10/2022, s/p R total hip replacement ~30 years ago, afib/aflutter, hypothyroid, CKD, bladder cancer, and cholangiocarcinoma (s/p treatment, stable) presenting from home with worsening R hip pain. Patient was found to have UTI in 9/2023 and was treated for 1 month. In 10/2023, patient was found to be bacteremic with septic joint on R hip, was treated with 6 week course of IV antibiotics without surgical debridement or operation given age and other comorbidities. Patient was discharged from [place] to [facility], was able to work with PT appropriately. He finished last dose of IV antibiotics and was discharged home on Tuesday. He was seen by Dr. Darby and was being planned for IV antibiotics but has not started yet. He started having significant pain on the R hip again, now unable to bear weight. No fever or chills. 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 Patient was found to have UTI in 9/2023 and was treated for 1 month. In 10/2023, patient was found to be bacteremic with septic joint on R hip, was treated with 6 week course of IV antibiotics without surgical debridement or operation given age and other comorbidities. Patient was discharged from [place] to [facility], was able to work with PT appropriately. He finished last dose of IV antibiotics and was discharged home on Tuesday. He was seen by Dr. Darby and was being planned for IV antibiotics but has not started yet. He started having significant pain on the R hip again, now unable to bear weight. No fever or chills. 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

## 2023-12-11 NOTE — PROGRESS NOTE ADULT - SUBJECTIVE AND OBJECTIVE BOX
DATE OF SERVICE: 12-11-23 @ 13:09    Patient is a 89y old  Male who presents with a chief complaint of pain (11 Dec 2023 09:43)      SUBJECTIVE / OVERNIGHT EVENTS:  No chest pain. No shortness of breath. . No events overnight.  +sob    MEDICATIONS  (STANDING):  albuterol/ipratropium for Nebulization 3 milliLiter(s) Nebulizer every 6 hours  apixaban 2.5 milliGRAM(s) Oral two times a day  atorvastatin 40 milliGRAM(s) Oral at bedtime  finasteride 5 milliGRAM(s) Oral daily  furosemide   Injectable 40 milliGRAM(s) IV Push daily  levothyroxine 75 MICROGram(s) Oral daily  metoprolol succinate  milliGRAM(s) Oral two times a day  minocycline IVPB 100 milliGRAM(s) IV Intermittent every 12 hours  potassium chloride    Tablet ER 10 milliEquivalent(s) Oral daily  senna 2 Tablet(s) Oral at bedtime  tamsulosin 0.4 milliGRAM(s) Oral at bedtime  tiotropium 2.5 MICROgram(s) Inhaler 2 Puff(s) Inhalation daily    MEDICATIONS  (PRN):  sodium chloride 0.65% Nasal 1 Spray(s) Both Nostrils four times a day PRN Nasal Congestion      Vital Signs Last 24 Hrs  T(C): 36.3 (11 Dec 2023 08:59), Max: 36.8 (11 Dec 2023 04:03)  T(F): 97.3 (11 Dec 2023 08:59), Max: 98.2 (11 Dec 2023 04:03)  HR: 82 (11 Dec 2023 08:59) (76 - 86)  BP: 108/70 (11 Dec 2023 08:59) (97/60 - 111/64)  BP(mean): --  RR: 18 (11 Dec 2023 08:59) (18 - 18)  SpO2: 97% (11 Dec 2023 08:59) (96% - 97%)    Parameters below as of 11 Dec 2023 08:59  Patient On (Oxygen Delivery Method): room air      CAPILLARY BLOOD GLUCOSE        I&O's Summary    10 Dec 2023 07:01  -  11 Dec 2023 07:00  --------------------------------------------------------  IN: 680 mL / OUT: 1600 mL / NET: -920 mL        PHYSICAL EXAM:  GENERAL: NAD, well-developed  HEAD:  Atraumatic, Normocephalic  EYES: EOMI, PERRLA, conjunctiva and sclera clear  NECK: Supple, No JVD  CHEST/LUNG: Clear to auscultation bilaterally; No wheeze  HEART: Regular rate and rhythm; No murmurs, rubs, or gallops  ABDOMEN: Soft, Nontender, Nondistended; Bowel sounds present  EXTREMITIES:  2+ Peripheral Pulses, No clubbing, cyanosis, 3 plus edema  PSYCH: AAOx3  NEUROLOGY: non-focal  SKIN: No rashes or lesions    LABS:                        7.4    9.98  )-----------( 186      ( 11 Dec 2023 06:14 )             24.5     12-11    139  |  104  |  35<H>  ----------------------------<  105<H>  3.9   |  24  |  1.28    Ca    8.7      11 Dec 2023 06:14  Phos  2.9     12-11  Mg     2.0     12-11    TPro  6.2  /  Alb  2.9<L>  /  TBili  0.6  /  DBili  x   /  AST  27  /  ALT  30  /  AlkPhos  192<H>  12-10          Urinalysis Basic - ( 11 Dec 2023 06:14 )    Color: x / Appearance: x / SG: x / pH: x  Gluc: 105 mg/dL / Ketone: x  / Bili: x / Urobili: x   Blood: x / Protein: x / Nitrite: x   Leuk Esterase: x / RBC: x / WBC x   Sq Epi: x / Non Sq Epi: x / Bacteria: x    < from: Xray Chest 1 View- PORTABLE-Urgent (Xray Chest 1 View- PORTABLE-Urgent .) (12.09.23 @ 12:27) >    Small bilateral pleural effusions. Basilar atelectasis/airspace disease.    < end of copied text >      RADIOLOGY & ADDITIONAL TESTS:    Imaging Personally Reviewed:    Consultant(s) Notes Reviewed:      Care Discussed with Consultants/Other Providers:

## 2023-12-11 NOTE — PROGRESS NOTE ADULT - SUBJECTIVE AND OBJECTIVE BOX
Subjective: Patient seen and examined. No new events except as noted.   feels weak     REVIEW OF SYSTEMS:    CONSTITUTIONAL: + weakness, fevers or chills  EYES/ENT: No visual changes;  No vertigo or throat pain   NECK: No pain or stiffness  RESPIRATORY: No cough, wheezing, hemoptysis; No shortness of breath  CARDIOVASCULAR: No chest pain or palpitations  GASTROINTESTINAL: No abdominal or epigastric pain. No nausea, vomiting, or hematemesis; No diarrhea or constipation. No melena or hematochezia.  GENITOURINARY: No dysuria, frequency or hematuria  NEUROLOGICAL: No numbness or weakness  SKIN: No itching, burning, rashes, or lesions   All other review of systems is negative unless indicated above.    MEDICATIONS:  MEDICATIONS  (STANDING):  albuterol/ipratropium for Nebulization 3 milliLiter(s) Nebulizer every 6 hours  apixaban 2.5 milliGRAM(s) Oral two times a day  atorvastatin 40 milliGRAM(s) Oral at bedtime  DAPTOmycin IVPB 400 milliGRAM(s) IV Intermittent every 24 hours  finasteride 5 milliGRAM(s) Oral daily  furosemide   Injectable 40 milliGRAM(s) IV Push daily  levothyroxine 75 MICROGram(s) Oral daily  metoprolol succinate  milliGRAM(s) Oral two times a day  potassium chloride    Tablet ER 10 milliEquivalent(s) Oral daily  senna 2 Tablet(s) Oral at bedtime  tamsulosin 0.4 milliGRAM(s) Oral at bedtime  tiotropium 2.5 MICROgram(s) Inhaler 2 Puff(s) Inhalation daily      PHYSICAL EXAM:  T(C): 36.3 (12-11-23 @ 08:59), Max: 36.8 (12-11-23 @ 04:03)  HR: 82 (12-11-23 @ 08:59) (76 - 86)  BP: 108/70 (12-11-23 @ 08:59) (97/60 - 111/64)  RR: 18 (12-11-23 @ 08:59) (18 - 18)  SpO2: 97% (12-11-23 @ 08:59) (96% - 97%)  Wt(kg): --  I&O's Summary    10 Dec 2023 07:01  -  11 Dec 2023 07:00  --------------------------------------------------------  IN: 680 mL / OUT: 1600 mL / NET: -920 mL              Appearance: NAD  HEENT:   Dry  oral mucosa, PERRL, EOMI	  Lymphatic: No lymphadenopathy  Cardiovascular: Normal S1 S2, No JVD, + systolic murmurs, No edema  Respiratory: Lungs clear to auscultation	  Psychiatry: A & O x 3, Mood & affect appropriate  Gastrointestinal:  Soft, Non-tender, + BS	  Skin: No rashes, No ecchymoses, No cyanosis	  Neurologic: Non-focal  Extremities: Normal range of motion, No clubbing, cyanosis or edema  Vascular: Peripheral pulses palpable 2+ bilaterally        LABS:    CARDIAC MARKERS:                                7.4    9.98  )-----------( 186      ( 11 Dec 2023 06:14 )             24.5     12-11    139  |  104  |  35<H>  ----------------------------<  105<H>  3.9   |  24  |  1.28    Ca    8.7      11 Dec 2023 06:14  Phos  2.9     12-11  Mg     2.0     12-11    TPro  6.2  /  Alb  2.9<L>  /  TBili  0.6  /  DBili  x   /  AST  27  /  ALT  30  /  AlkPhos  192<H>  12-10    proBNP:   Lipid Profile:   HgA1c:   TSH:             TELEMETRY: 	    ECG:  	  RADIOLOGY:   DIAGNOSTIC TESTING:  [ ] Echocardiogram:  [ ]  Catheterization:  [ ] Stress Test:    OTHER:

## 2023-12-11 NOTE — PROGRESS NOTE ADULT - PROBLEM SELECTOR PLAN 2
severe prosthetic aortic valve stenosis   appears compensated at present time   given advanced age and decreased mobility, would favor conservative management   will discuss further with family regarding invasive options.

## 2023-12-11 NOTE — PROGRESS NOTE ADULT - SUBJECTIVE AND OBJECTIVE BOX
pain controlled  copd and anemia being managed now  transferring with pt    nad  rle  not ttp  firing ta/ehl  spn/dpn intact  bcr    extensive discussion with daughter and granddaughter  will attempt for nonoperative managmeent with antibiotics  po pain control  transfuse for chronic anemia  copd management  outpatient follow up    Gene Vickers M.D.  Attending Orthopedic Surgeon

## 2023-12-12 LAB
CULTURE RESULTS: SIGNIFICANT CHANGE UP
HCT VFR BLD CALC: 27.5 % — LOW (ref 39–50)
HCT VFR BLD CALC: 27.5 % — LOW (ref 39–50)
HGB BLD-MCNC: 8.7 G/DL — LOW (ref 13–17)
HGB BLD-MCNC: 8.7 G/DL — LOW (ref 13–17)
MCHC RBC-ENTMCNC: 31.5 PG — SIGNIFICANT CHANGE UP (ref 27–34)
MCHC RBC-ENTMCNC: 31.5 PG — SIGNIFICANT CHANGE UP (ref 27–34)
MCHC RBC-ENTMCNC: 31.6 GM/DL — LOW (ref 32–36)
MCHC RBC-ENTMCNC: 31.6 GM/DL — LOW (ref 32–36)
MCV RBC AUTO: 99.6 FL — SIGNIFICANT CHANGE UP (ref 80–100)
MCV RBC AUTO: 99.6 FL — SIGNIFICANT CHANGE UP (ref 80–100)
NRBC # BLD: 0 /100 WBCS — SIGNIFICANT CHANGE UP (ref 0–0)
NRBC # BLD: 0 /100 WBCS — SIGNIFICANT CHANGE UP (ref 0–0)
PLATELET # BLD AUTO: 185 K/UL — SIGNIFICANT CHANGE UP (ref 150–400)
PLATELET # BLD AUTO: 185 K/UL — SIGNIFICANT CHANGE UP (ref 150–400)
RBC # BLD: 2.76 M/UL — LOW (ref 4.2–5.8)
RBC # BLD: 2.76 M/UL — LOW (ref 4.2–5.8)
RBC # FLD: 16.5 % — HIGH (ref 10.3–14.5)
RBC # FLD: 16.5 % — HIGH (ref 10.3–14.5)
SPECIMEN SOURCE: SIGNIFICANT CHANGE UP
WBC # BLD: 10.64 K/UL — HIGH (ref 3.8–10.5)
WBC # BLD: 10.64 K/UL — HIGH (ref 3.8–10.5)
WBC # FLD AUTO: 10.64 K/UL — HIGH (ref 3.8–10.5)
WBC # FLD AUTO: 10.64 K/UL — HIGH (ref 3.8–10.5)

## 2023-12-12 RX ORDER — MINOCYCLINE HYDROCHLORIDE 45 MG/1
100 TABLET, EXTENDED RELEASE ORAL EVERY 12 HOURS
Refills: 0 | Status: DISCONTINUED | OUTPATIENT
Start: 2023-12-12 | End: 2023-12-18

## 2023-12-12 RX ADMIN — Medication 3 MILLILITER(S): at 12:15

## 2023-12-12 RX ADMIN — TRAMADOL HYDROCHLORIDE 25 MILLIGRAM(S): 50 TABLET ORAL at 20:30

## 2023-12-12 RX ADMIN — SENNA PLUS 2 TABLET(S): 8.6 TABLET ORAL at 21:13

## 2023-12-12 RX ADMIN — TIOTROPIUM BROMIDE 2 PUFF(S): 18 CAPSULE ORAL; RESPIRATORY (INHALATION) at 12:16

## 2023-12-12 RX ADMIN — APIXABAN 2.5 MILLIGRAM(S): 2.5 TABLET, FILM COATED ORAL at 18:31

## 2023-12-12 RX ADMIN — ATORVASTATIN CALCIUM 40 MILLIGRAM(S): 80 TABLET, FILM COATED ORAL at 21:14

## 2023-12-12 RX ADMIN — TRAMADOL HYDROCHLORIDE 25 MILLIGRAM(S): 50 TABLET ORAL at 14:08

## 2023-12-12 RX ADMIN — TRAMADOL HYDROCHLORIDE 25 MILLIGRAM(S): 50 TABLET ORAL at 13:38

## 2023-12-12 RX ADMIN — Medication 100 MILLIGRAM(S): at 05:03

## 2023-12-12 RX ADMIN — Medication 10 MILLIEQUIVALENT(S): at 12:15

## 2023-12-12 RX ADMIN — Medication 75 MICROGRAM(S): at 05:04

## 2023-12-12 RX ADMIN — Medication 40 MILLIGRAM(S): at 05:03

## 2023-12-12 RX ADMIN — TAMSULOSIN HYDROCHLORIDE 0.4 MILLIGRAM(S): 0.4 CAPSULE ORAL at 21:13

## 2023-12-12 RX ADMIN — Medication 100 MILLIGRAM(S): at 18:31

## 2023-12-12 RX ADMIN — MINOCYCLINE HYDROCHLORIDE 100 MILLIGRAM(S): 45 TABLET, EXTENDED RELEASE ORAL at 01:34

## 2023-12-12 RX ADMIN — MINOCYCLINE HYDROCHLORIDE 100 MILLIGRAM(S): 45 TABLET, EXTENDED RELEASE ORAL at 18:55

## 2023-12-12 RX ADMIN — TRAMADOL HYDROCHLORIDE 25 MILLIGRAM(S): 50 TABLET ORAL at 19:56

## 2023-12-12 RX ADMIN — Medication 3 MILLILITER(S): at 18:32

## 2023-12-12 RX ADMIN — FINASTERIDE 5 MILLIGRAM(S): 5 TABLET, FILM COATED ORAL at 12:15

## 2023-12-12 RX ADMIN — APIXABAN 2.5 MILLIGRAM(S): 2.5 TABLET, FILM COATED ORAL at 05:03

## 2023-12-12 RX ADMIN — Medication 3 MILLILITER(S): at 05:04

## 2023-12-12 RX ADMIN — Medication 3 MILLILITER(S): at 23:15

## 2023-12-12 NOTE — PROGRESS NOTE ADULT - ASSESSMENT
89M with history of HTN, HLD, COPD (on intermittent 2L NC), CAD (no stents), s/p bioprosthetic AVR, CVA in 10/2022, s/p R total hip replacement ~30 years ago, afib/aflutter, hypothyroid, CKD, bladder cancer, and cholangiocarcinoma (s/p treatment, stable) presenting from home with worsening R hip pain. Patient was found to have UTI in 9/2023 and was treated for 1 month. In 10/2023, patient was found to be bacteremic with septic joint on R hip, was treated with 6 week course of IV antibiotics without surgical debridement or operation given age and other comorbidities. Patient was discharged from [place] to [facility], was able to work with PT appropriately. He finished last dose of IV antibiotics and was discharged home on Tuesday. He was seen by Dr. Darby and was being planned for IV antibiotics but has not started yet. He started having significant pain on the R hip again, now unable to bear weight. No fever or chills. 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 Patient was found to have UTI in 9/2023 and was treated for 1 month. In 10/2023, patient was found to be bacteremic with septic joint on R hip, was treated with 6 week course of IV antibiotics without surgical debridement or operation given age and other comorbidities. Patient was discharged from [place] to [facility], was able to work with PT appropriately. He finished last dose of IV antibiotics and was discharged home on Tuesday. He was seen by Dr. Darby and was being planned for IV antibiotics but has not started yet. He started having significant pain on the R hip again, now unable to bear weight. No fever or chills. 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

## 2023-12-12 NOTE — PROGRESS NOTE ADULT - SUBJECTIVE AND OBJECTIVE BOX
DATE OF SERVICE: 12-12-23 @ 11:24    Patient is a 89y old  Male who presents with a chief complaint of pain (11 Dec 2023 09:43)      SUBJECTIVE / OVERNIGHT EVENTS:  No chest pain. No shortness of breath. No complaints. No events overnight.     MEDICATIONS  (STANDING):  albuterol/ipratropium for Nebulization 3 milliLiter(s) Nebulizer every 6 hours  apixaban 2.5 milliGRAM(s) Oral two times a day  atorvastatin 40 milliGRAM(s) Oral at bedtime  finasteride 5 milliGRAM(s) Oral daily  furosemide   Injectable 40 milliGRAM(s) IV Push daily  levothyroxine 75 MICROGram(s) Oral daily  metoprolol succinate  milliGRAM(s) Oral two times a day  minocycline 100 milliGRAM(s) Oral every 12 hours  potassium chloride    Tablet ER 10 milliEquivalent(s) Oral daily  senna 2 Tablet(s) Oral at bedtime  tamsulosin 0.4 milliGRAM(s) Oral at bedtime  tiotropium 2.5 MICROgram(s) Inhaler 2 Puff(s) Inhalation daily    MEDICATIONS  (PRN):  sodium chloride 0.65% Nasal 1 Spray(s) Both Nostrils four times a day PRN Nasal Congestion  traMADol 25 milliGRAM(s) Oral every 6 hours PRN Moderate Pain (4 - 6)  traMADol 50 milliGRAM(s) Oral every 6 hours PRN Severe Pain (7 - 10)      Vital Signs Last 24 Hrs  T(C): 36.7 (12 Dec 2023 04:59), Max: 37.3 (11 Dec 2023 21:55)  T(F): 98 (12 Dec 2023 04:59), Max: 99.1 (11 Dec 2023 21:55)  HR: 68 (12 Dec 2023 11:15) (68 - 79)  BP: 106/65 (12 Dec 2023 11:15) (106/65 - 116/72)  BP(mean): --  RR: 20 (12 Dec 2023 04:59) (20 - 20)  SpO2: 96% (12 Dec 2023 11:15) (96% - 98%)    Parameters below as of 12 Dec 2023 11:15  Patient On (Oxygen Delivery Method): nasal cannula  O2 Flow (L/min): 2    CAPILLARY BLOOD GLUCOSE        I&O's Summary    11 Dec 2023 07:01  -  12 Dec 2023 07:00  --------------------------------------------------------  IN: 970 mL / OUT: 660 mL / NET: 310 mL        PHYSICAL EXAM:  GENERAL: NAD, well-developed  HEAD:  Atraumatic, Normocephalic  EYES: EOMI, PERRLA, conjunctiva and sclera clear  NECK: Supple, No JVD  CHEST/LUNG: Clear to auscultation bilaterally; No wheeze  HEART: Regular rate and rhythm; No murmurs, rubs, or gallops  ABDOMEN: Soft, Nontender, Nondistended; Bowel sounds present  EXTREMITIES:  2+ Peripheral Pulses, No clubbing, cyanosis, aravind edema  PSYCH: AAOx3  NEUROLOGY: non-focal  SKIN: No rashes or lesions    LABS:                        8.7    10.64 )-----------( 185      ( 12 Dec 2023 07:13 )             27.5     12-11    139  |  104  |  35<H>  ----------------------------<  105<H>  3.9   |  24  |  1.28    Ca    8.7      11 Dec 2023 06:14  Phos  2.9     12-11  Mg     2.0     12-11            Urinalysis Basic - ( 11 Dec 2023 06:14 )    Color: x / Appearance: x / SG: x / pH: x  Gluc: 105 mg/dL / Ketone: x  / Bili: x / Urobili: x   Blood: x / Protein: x / Nitrite: x   Leuk Esterase: x / RBC: x / WBC x   Sq Epi: x / Non Sq Epi: x / Bacteria: x    < from: VA Duplex Lower Ext Vein Scan, Bilat (12.08.23 @ 20:32) >  INTERPRETATION:  CLINICAL INDICATION: Swelling, pain.    TECHNIQUE: Grayscale, color Doppler and spectral Doppler ultrasound was   utilized to evaluate bilateral lower extremity deep venous system.    COMPARISON: 11/21/2022.    FINDINGS: There is no obvious thrombus in bilateral common femoral veins,   femoral veins or popliteal veins. Visualized calf veins are patent.   Bilateral leg soft tissue edema.    IMPRESSION:    No obvious deep vein thrombosis in either lower extremity.    --- End of Report ---        < end of copied text >      RADIOLOGY & ADDITIONAL TESTS:    Imaging Personally Reviewed:    Consultant(s) Notes Reviewed:      Care Discussed with Consultants/Other Providers:

## 2023-12-12 NOTE — PROGRESS NOTE ADULT - SUBJECTIVE AND OBJECTIVE BOX
Subjective: Patient seen and examined. No new events except as noted.     REVIEW OF SYSTEMS:    CONSTITUTIONAL: +weakness, fevers or chills  EYES/ENT: No visual changes;  No vertigo or throat pain   NECK: No pain or stiffness  RESPIRATORY: No cough, wheezing, hemoptysis; No shortness of breath  CARDIOVASCULAR: No chest pain or palpitations  GASTROINTESTINAL: No abdominal or epigastric pain. No nausea, vomiting, or hematemesis; No diarrhea or constipation. No melena or hematochezia.  GENITOURINARY: No dysuria, frequency or hematuria  NEUROLOGICAL: No numbness or weakness  SKIN: No itching, burning, rashes, or lesions   All other review of systems is negative unless indicated above.    MEDICATIONS:  MEDICATIONS  (STANDING):  albuterol/ipratropium for Nebulization 3 milliLiter(s) Nebulizer every 6 hours  apixaban 2.5 milliGRAM(s) Oral two times a day  atorvastatin 40 milliGRAM(s) Oral at bedtime  finasteride 5 milliGRAM(s) Oral daily  furosemide   Injectable 40 milliGRAM(s) IV Push daily  levothyroxine 75 MICROGram(s) Oral daily  metoprolol succinate  milliGRAM(s) Oral two times a day  minocycline 100 milliGRAM(s) Oral every 12 hours  potassium chloride    Tablet ER 10 milliEquivalent(s) Oral daily  senna 2 Tablet(s) Oral at bedtime  tamsulosin 0.4 milliGRAM(s) Oral at bedtime  tiotropium 2.5 MICROgram(s) Inhaler 2 Puff(s) Inhalation daily      PHYSICAL EXAM:  T(C): 36.6 (12-12-23 @ 12:06), Max: 37.3 (12-11-23 @ 21:55)  HR: 83 (12-12-23 @ 12:06) (68 - 83)  BP: 102/64 (12-12-23 @ 12:06) (102/64 - 116/72)  RR: 18 (12-12-23 @ 12:06) (18 - 20)  SpO2: 96% (12-12-23 @ 12:06) (96% - 98%)  Wt(kg): --  I&O's Summary    11 Dec 2023 07:01  -  12 Dec 2023 07:00  --------------------------------------------------------  IN: 970 mL / OUT: 660 mL / NET: 310 mL          Appearance: NAD  HEENT:   Dry  oral mucosa, PERRL, EOMI	  Lymphatic: No lymphadenopathy  Cardiovascular: Normal S1 S2, No JVD, + systolic murmurs, No edema  Respiratory: Lungs clear to auscultation	  Psychiatry: A & O x 3, Mood & affect appropriate  Gastrointestinal:  Soft, Non-tender, + BS	  Skin: No rashes, No ecchymoses, No cyanosis	  Neurologic: Non-focal  Extremities: Normal range of motion, No clubbing, cyanosis or edema  Vascular: Peripheral pulses palpable 2+ bilaterally        LABS:    CARDIAC MARKERS:                                8.7    10.64 )-----------( 185      ( 12 Dec 2023 07:13 )             27.5     12-11    139  |  104  |  35<H>  ----------------------------<  105<H>  3.9   |  24  |  1.28    Ca    8.7      11 Dec 2023 06:14  Phos  2.9     12-11  Mg     2.0     12-11      proBNP:   Lipid Profile:   HgA1c:   TSH:             TELEMETRY: 	    ECG:  	  RADIOLOGY:   DIAGNOSTIC TESTING:  [ ] Echocardiogram:  [ ]  Catheterization:  [ ] Stress Test:    OTHER:

## 2023-12-12 NOTE — PROGRESS NOTE ADULT - ASSESSMENT
89M with history of HTN, HLD, COPD (on intermittent 2L NC), CAD (no stents), s/p bioprosthetic AVR, CVA in 10/2022, s/p R total hip replacement ~30 years ago, afib/aflutter, hypothyroid, CKD, bladder cancer, and cholangiocarcinoma (s/p treatment, stable) presenting from home with worsening R hip pain. Accompanied by granddaughter at bedside. Patient was found to have UTI in 9/2023 and was treated for 1 month. In 10/2023, patient was found to be bacteremic with septic joint on R hip, was treated with 6 week course of IV antibiotics without surgical debridement or operation given age and other comorbidities. Patient was discharged from [place] to [facility], was able to work with PT appropriately. He finished last dose of IV antibiotics and was discharged home on Tuesday. He was seen by Dr. Redding in clinic yesterday and was being planned for IV antibiotics but has not started yet. He started having significant pain on the R hip again since discharge, now unable to bear weight. No fever or chills. No falls or trauma    right hip infection  - Blood cultures times 2 NGTD  - daptomycin 6 mg/kg q day  - change to po minocycline   - ID consult appreciated  - CT of right hip done  - no plans for surgery or drainage  - ID following    aravind lower ext edema with dyspnea  - CXR  - check doppler  - iv lasix 40 qd    severe prosthetic aortic valve stenosis  - cardio consult following  - conservative management    anemia  - iron studies c/w iron def  - FOBT  - iv iron  -  s/p transfusion 1 unit prbc    afib , AVR  - c/w metoprolol  - c/w eliquis 2.5 bid    CAD  - c/w plavix    COPD  - c/w inhalers    BPH  - flomax and finasteride    HLD  - c/w lipitor    dvt px  - on eliquis    d/c planning  - ERIC              89M with history of HTN, HLD, COPD (on intermittent 2L NC), CAD (no stents), s/p bioprosthetic AVR, CVA in 10/2022, s/p R total hip replacement ~30 years ago, afib/aflutter, hypothyroid, CKD, bladder cancer, and cholangiocarcinoma (s/p treatment, stable) presenting from home with worsening R hip pain. Accompanied by granddaughter at bedside. Patient was found to have UTI in 9/2023 and was treated for 1 month. In 10/2023, patient was found to be bacteremic with septic joint on R hip, was treated with 6 week course of IV antibiotics without surgical debridement or operation given age and other comorbidities. Patient was discharged from [place] to [facility], was able to work with PT appropriately. He finished last dose of IV antibiotics and was discharged home on Tuesday. He was seen by Dr. Redding in clinic yesterday and was being planned for IV antibiotics but has not started yet. He started having significant pain on the R hip again since discharge, now unable to bear weight. No fever or chills. No falls or trauma    right hip infection  - Blood cultures times 2 NGTD  - daptomycin 6 mg/kg q day  - change to po minocycline   - ID consult appreciated  - CT of right hip done  - no plans for surgery or drainage  - ID following    aravind lower ext edema with dyspnea  - CXR  - check doppler  - iv lasix 40 qd    severe prosthetic aortic valve stenosis  - cardio consult following  - conservative management    anemia  - iron studies c/w iron def  - FOBT  - iv iron  -  s/p transfusion 1 unit prbc    afib , AVR  - c/w metoprolol  - c/w eliquis 2.5 bid    CAD  - c/w plavix    COPD  - c/w inhalers    BPH  - flomax and finasteride    HLD  - c/w lipitor    dvt px  - on eliquis    d/c planning  - ERIC

## 2023-12-13 LAB
HCT VFR BLD CALC: 26.8 % — LOW (ref 39–50)
HCT VFR BLD CALC: 26.8 % — LOW (ref 39–50)
HGB BLD-MCNC: 8.7 G/DL — LOW (ref 13–17)
HGB BLD-MCNC: 8.7 G/DL — LOW (ref 13–17)
MCHC RBC-ENTMCNC: 32.2 PG — SIGNIFICANT CHANGE UP (ref 27–34)
MCHC RBC-ENTMCNC: 32.2 PG — SIGNIFICANT CHANGE UP (ref 27–34)
MCHC RBC-ENTMCNC: 32.5 GM/DL — SIGNIFICANT CHANGE UP (ref 32–36)
MCHC RBC-ENTMCNC: 32.5 GM/DL — SIGNIFICANT CHANGE UP (ref 32–36)
MCV RBC AUTO: 99.3 FL — SIGNIFICANT CHANGE UP (ref 80–100)
MCV RBC AUTO: 99.3 FL — SIGNIFICANT CHANGE UP (ref 80–100)
NRBC # BLD: 0 /100 WBCS — SIGNIFICANT CHANGE UP (ref 0–0)
NRBC # BLD: 0 /100 WBCS — SIGNIFICANT CHANGE UP (ref 0–0)
PLATELET # BLD AUTO: 180 K/UL — SIGNIFICANT CHANGE UP (ref 150–400)
PLATELET # BLD AUTO: 180 K/UL — SIGNIFICANT CHANGE UP (ref 150–400)
RBC # BLD: 2.7 M/UL — LOW (ref 4.2–5.8)
RBC # BLD: 2.7 M/UL — LOW (ref 4.2–5.8)
RBC # FLD: 17 % — HIGH (ref 10.3–14.5)
RBC # FLD: 17 % — HIGH (ref 10.3–14.5)
WBC # BLD: 11.48 K/UL — HIGH (ref 3.8–10.5)
WBC # BLD: 11.48 K/UL — HIGH (ref 3.8–10.5)
WBC # FLD AUTO: 11.48 K/UL — HIGH (ref 3.8–10.5)
WBC # FLD AUTO: 11.48 K/UL — HIGH (ref 3.8–10.5)

## 2023-12-13 PROCEDURE — 99232 SBSQ HOSP IP/OBS MODERATE 35: CPT

## 2023-12-13 PROCEDURE — 71045 X-RAY EXAM CHEST 1 VIEW: CPT | Mod: 26

## 2023-12-13 RX ORDER — MINOCYCLINE HYDROCHLORIDE 45 MG/1
1 TABLET, EXTENDED RELEASE ORAL
Qty: 60 | Refills: 0
Start: 2023-12-13 | End: 2024-01-11

## 2023-12-13 RX ADMIN — Medication 3 MILLILITER(S): at 11:22

## 2023-12-13 RX ADMIN — APIXABAN 2.5 MILLIGRAM(S): 2.5 TABLET, FILM COATED ORAL at 17:52

## 2023-12-13 RX ADMIN — FINASTERIDE 5 MILLIGRAM(S): 5 TABLET, FILM COATED ORAL at 11:23

## 2023-12-13 RX ADMIN — TAMSULOSIN HYDROCHLORIDE 0.4 MILLIGRAM(S): 0.4 CAPSULE ORAL at 21:14

## 2023-12-13 RX ADMIN — Medication 3 MILLILITER(S): at 17:51

## 2023-12-13 RX ADMIN — APIXABAN 2.5 MILLIGRAM(S): 2.5 TABLET, FILM COATED ORAL at 05:14

## 2023-12-13 RX ADMIN — MINOCYCLINE HYDROCHLORIDE 100 MILLIGRAM(S): 45 TABLET, EXTENDED RELEASE ORAL at 05:15

## 2023-12-13 RX ADMIN — Medication 3 MILLILITER(S): at 05:15

## 2023-12-13 RX ADMIN — MINOCYCLINE HYDROCHLORIDE 100 MILLIGRAM(S): 45 TABLET, EXTENDED RELEASE ORAL at 17:52

## 2023-12-13 RX ADMIN — ATORVASTATIN CALCIUM 40 MILLIGRAM(S): 80 TABLET, FILM COATED ORAL at 21:14

## 2023-12-13 RX ADMIN — TIOTROPIUM BROMIDE 2 PUFF(S): 18 CAPSULE ORAL; RESPIRATORY (INHALATION) at 11:22

## 2023-12-13 RX ADMIN — Medication 75 MICROGRAM(S): at 05:14

## 2023-12-13 RX ADMIN — Medication 100 MILLIGRAM(S): at 17:52

## 2023-12-13 RX ADMIN — Medication 40 MILLIGRAM(S): at 05:14

## 2023-12-13 RX ADMIN — Medication 100 MILLIGRAM(S): at 05:14

## 2023-12-13 RX ADMIN — Medication 3 MILLILITER(S): at 23:30

## 2023-12-13 RX ADMIN — SENNA PLUS 2 TABLET(S): 8.6 TABLET ORAL at 21:14

## 2023-12-13 RX ADMIN — Medication 10 MILLIEQUIVALENT(S): at 11:22

## 2023-12-13 NOTE — PROGRESS NOTE ADULT - SUBJECTIVE AND OBJECTIVE BOX
DATE OF SERVICE: 12-13-23 @ 13:05    Patient is a 89y old  Male who presents with a chief complaint of wd (13 Dec 2023 10:22)      SUBJECTIVE / OVERNIGHT EVENTS:  No chest pain. No shortness of breath. No complaints. No events overnight.     MEDICATIONS  (STANDING):  albuterol/ipratropium for Nebulization 3 milliLiter(s) Nebulizer every 6 hours  apixaban 2.5 milliGRAM(s) Oral two times a day  atorvastatin 40 milliGRAM(s) Oral at bedtime  finasteride 5 milliGRAM(s) Oral daily  furosemide   Injectable 40 milliGRAM(s) IV Push daily  levothyroxine 75 MICROGram(s) Oral daily  metoprolol succinate  milliGRAM(s) Oral two times a day  minocycline 100 milliGRAM(s) Oral every 12 hours  potassium chloride    Tablet ER 10 milliEquivalent(s) Oral daily  senna 2 Tablet(s) Oral at bedtime  tamsulosin 0.4 milliGRAM(s) Oral at bedtime  tiotropium 2.5 MICROgram(s) Inhaler 2 Puff(s) Inhalation daily    MEDICATIONS  (PRN):  sodium chloride 0.65% Nasal 1 Spray(s) Both Nostrils four times a day PRN Nasal Congestion  traMADol 25 milliGRAM(s) Oral every 6 hours PRN Moderate Pain (4 - 6)  traMADol 50 milliGRAM(s) Oral every 6 hours PRN Severe Pain (7 - 10)      Vital Signs Last 24 Hrs  T(C): 36.6 (13 Dec 2023 04:29), Max: 37.3 (12 Dec 2023 16:44)  T(F): 97.9 (13 Dec 2023 04:29), Max: 99.2 (12 Dec 2023 16:44)  HR: 82 (13 Dec 2023 04:29) (82 - 90)  BP: 109/68 (13 Dec 2023 04:29) (101/62 - 111/76)  BP(mean): --  RR: 18 (13 Dec 2023 04:29) (18 - 20)  SpO2: 95% (13 Dec 2023 04:29) (95% - 98%)    Parameters below as of 13 Dec 2023 04:29  Patient On (Oxygen Delivery Method): nasal cannula  O2 Flow (L/min): 2    CAPILLARY BLOOD GLUCOSE        I&O's Summary    12 Dec 2023 07:01  -  13 Dec 2023 07:00  --------------------------------------------------------  IN: 240 mL / OUT: 0 mL / NET: 240 mL        PHYSICAL EXAM:  GENERAL: NAD, well-developed  HEAD:  Atraumatic, Normocephalic  EYES: EOMI, PERRLA, conjunctiva and sclera clear  NECK: Supple, No JVD  CHEST/LUNG: Clear to auscultation bilaterally; No wheeze  HEART: Regular rate and rhythm; No murmurs, rubs, or gallops  ABDOMEN: Soft, Nontender, Nondistended; Bowel sounds present  EXTREMITIES:  2+ Peripheral Pulses, No clubbing, cyanosis, aravind edema  PSYCH: AAOx3  NEUROLOGY: non-focal  SKIN: No rashes or lesions    LABS:                        8.7    11.48 )-----------( 180      ( 13 Dec 2023 06:06 )             26.8                     RADIOLOGY & ADDITIONAL TESTS:    Imaging Personally Reviewed:    Consultant(s) Notes Reviewed:      Care Discussed with Consultants/Other Providers:

## 2023-12-13 NOTE — PROGRESS NOTE ADULT - SUBJECTIVE AND OBJECTIVE BOX
infectious diseases progress note:    Patient is a 89y old  Male who presents with a chief complaint of pain (11 Dec 2023 09:43)        Pain of right hip               Allergies    doxycycline (Unknown)  vancomycin (Unknown)  penicillins (Other)    Intolerances        ANTIBIOTICS/RELEVANT:  antimicrobials  minocycline 100 milliGRAM(s) Oral every 12 hours    immunologic:    OTHER:  albuterol/ipratropium for Nebulization 3 milliLiter(s) Nebulizer every 6 hours  apixaban 2.5 milliGRAM(s) Oral two times a day  atorvastatin 40 milliGRAM(s) Oral at bedtime  finasteride 5 milliGRAM(s) Oral daily  furosemide   Injectable 40 milliGRAM(s) IV Push daily  levothyroxine 75 MICROGram(s) Oral daily  metoprolol succinate  milliGRAM(s) Oral two times a day  potassium chloride    Tablet ER 10 milliEquivalent(s) Oral daily  senna 2 Tablet(s) Oral at bedtime  sodium chloride 0.65% Nasal 1 Spray(s) Both Nostrils four times a day PRN  tamsulosin 0.4 milliGRAM(s) Oral at bedtime  tiotropium 2.5 MICROgram(s) Inhaler 2 Puff(s) Inhalation daily  traMADol 50 milliGRAM(s) Oral every 6 hours PRN  traMADol 25 milliGRAM(s) Oral every 6 hours PRN      Objective:  Vital Signs Last 24 Hrs  T(C): 36.6 (13 Dec 2023 04:29), Max: 37.3 (12 Dec 2023 16:44)  T(F): 97.9 (13 Dec 2023 04:29), Max: 99.2 (12 Dec 2023 16:44)  HR: 82 (13 Dec 2023 04:29) (68 - 90)  BP: 109/68 (13 Dec 2023 04:29) (101/62 - 111/76)  BP(mean): --  RR: 18 (13 Dec 2023 04:29) (18 - 20)  SpO2: 95% (13 Dec 2023 04:29) (95% - 98%)    Parameters below as of 13 Dec 2023 04:29  Patient On (Oxygen Delivery Method): nasal cannula  O2 Flow (L/min): 2         Eyes:BELINDA, EOMI  Ear/Nose/Throat: no oral lesion, no sinus tenderness on percussion	  Neck:no JVD, no lymphadenopathy, supple  Respiratory: CTA aravind  Cardiovascular: S1S2 RRR, no murmurs  Gastrointestinal:soft, (+) BS, no HSM  Extremities:no e/e/c        LABS:                        8.7    11.48 )-----------( 180      ( 13 Dec 2023 06:06 )             26.8                   MICROBIOLOGY:    RECENT CULTURES:  12-07 @ 11:27 .Blood Blood-Peripheral                No growth at 5 days    12-07 @ 10:45 .Blood Blood-Peripheral                No growth at 5 days    12-07 @ 10:30 .Blood Blood-Peripheral                No growth at 5 days          RESPIRATORY CULTURES:              RADIOLOGY & ADDITIONAL STUDIES:        Pager [phone]  After 5 pm/weekends or if no response :[phone]    infectious diseases progress note:    Patient is a 89y old  Male who presents with a chief complaint of pain (11 Dec 2023 09:43)        Pain of right hip               Allergies    doxycycline (Unknown)  vancomycin (Unknown)  penicillins (Other)    Intolerances        ANTIBIOTICS/RELEVANT:  antimicrobials  minocycline 100 milliGRAM(s) Oral every 12 hours    immunologic:    OTHER:  albuterol/ipratropium for Nebulization 3 milliLiter(s) Nebulizer every 6 hours  apixaban 2.5 milliGRAM(s) Oral two times a day  atorvastatin 40 milliGRAM(s) Oral at bedtime  finasteride 5 milliGRAM(s) Oral daily  furosemide   Injectable 40 milliGRAM(s) IV Push daily  levothyroxine 75 MICROGram(s) Oral daily  metoprolol succinate  milliGRAM(s) Oral two times a day  potassium chloride    Tablet ER 10 milliEquivalent(s) Oral daily  senna 2 Tablet(s) Oral at bedtime  sodium chloride 0.65% Nasal 1 Spray(s) Both Nostrils four times a day PRN  tamsulosin 0.4 milliGRAM(s) Oral at bedtime  tiotropium 2.5 MICROgram(s) Inhaler 2 Puff(s) Inhalation daily  traMADol 50 milliGRAM(s) Oral every 6 hours PRN  traMADol 25 milliGRAM(s) Oral every 6 hours PRN      Objective:  Vital Signs Last 24 Hrs  T(C): 36.6 (13 Dec 2023 04:29), Max: 37.3 (12 Dec 2023 16:44)  T(F): 97.9 (13 Dec 2023 04:29), Max: 99.2 (12 Dec 2023 16:44)  HR: 82 (13 Dec 2023 04:29) (68 - 90)  BP: 109/68 (13 Dec 2023 04:29) (101/62 - 111/76)  BP(mean): --  RR: 18 (13 Dec 2023 04:29) (18 - 20)  SpO2: 95% (13 Dec 2023 04:29) (95% - 98%)    Parameters below as of 13 Dec 2023 04:29  Patient On (Oxygen Delivery Method): nasal cannula  O2 Flow (L/min): 2         Eyes:BELINDA, EOMI  Ear/Nose/Throat: no oral lesion, no sinus tenderness on percussion	  Neck:no JVD, no lymphadenopathy, supple  Respiratory: CTA aravind  Cardiovascular: S1S2 RRR, no murmurs  Gastrointestinal:soft, (+) BS, no HSM  Extremities:no e/e/c        LABS:                        8.7    11.48 )-----------( 180      ( 13 Dec 2023 06:06 )             26.8                   MICROBIOLOGY:    RECENT CULTURES:  12-07 @ 11:27 .Blood Blood-Peripheral                No growth at 5 days    12-07 @ 10:45 .Blood Blood-Peripheral                No growth at 5 days    12-07 @ 10:30 .Blood Blood-Peripheral                No growth at 5 days          RESPIRATORY CULTURES:              RADIOLOGY & ADDITIONAL STUDIES:        Pager [phone]  After 5 pm/weekends or if no response :[phone]

## 2023-12-13 NOTE — PROGRESS NOTE ADULT - ASSESSMENT
89M with history of HTN, HLD, COPD (on intermittent 2L NC), CAD (no stents), s/p bioprosthetic AVR, CVA in 10/2022, s/p R total hip replacement ~30 years ago, afib/aflutter, hypothyroid, CKD, bladder cancer, and cholangiocarcinoma (s/p treatment, stable) presenting from home with worsening R hip pain. Patient was found to have UTI in 9/2023 and was treated for 1 month. In 10/2023, patient was found to be bacteremic with septic joint on R hip, was treated with 6 week course of IV antibiotics without surgical debridement or operation given age and other comorbidities. Patient was discharged from [place] [facility], was able to work with PT appropriately. He finished last dose of IV antibiotics and was discharged home on Tuesday. He was seen by Dr. Darby and was being planned for IV antibiotics but has not started yet. He started having significant pain on the R hip again, now unable to bear weight. No fever or chills. 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 Patient was found to have UTI in 9/2023 and was treated for 1 month. In 10/2023, patient was found to be bacteremic with septic joint on R hip, was treated with 6 week course of IV antibiotics without surgical debridement or operation given age and other comorbidities. Patient was discharged from [place] to [facility], was able to work with PT appropriately. He finished last dose of IV antibiotics and was discharged home on Tuesday. He was seen by Dr. Darby and was being planned for IV antibiotics but has not started yet. He started having significant pain on the R hip again, now unable to bear weight. No fever or chills. 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

## 2023-12-13 NOTE — PROGRESS NOTE ADULT - ASSESSMENT
89M with history of HTN, HLD, COPD (on intermittent 2L NC), CAD (no stents), s/p bioprosthetic AVR, CVA in 10/2022, s/p R total hip replacement ~30 years ago, afib/aflutter, hypothyroid, CKD, bladder cancer, and cholangiocarcinoma (s/p treatment, stable) presenting from home with worsening R hip pain. Accompanied by granddaughter at bedside. Patient was found to have UTI in 9/2023 and was treated for 1 month. In 10/2023, patient was found to be bacteremic with septic joint on R hip, was treated with 6 week course of IV antibiotics without surgical debridement or operation given age and other comorbidities. Patient was discharged from [place] to [facility], was able to work with PT appropriately. He finished last dose of IV antibiotics and was discharged home on Tuesday. He was seen by Dr. Redding in clinic yesterday and was being planned for IV antibiotics but has not started yet. He started having significant pain on the R hip again since discharge, now unable to bear weight. No fever or chills. No falls or trauma    right hip infection  - Blood cultures times 2 NGTD  -  to go home on po suppression with minocycline  - ID consult appreciated  - CT of right hip done  - no plans for surgery or drainage    aravind lower ext edema with dyspnea  - CXR  - doppler  negative  - iv lasix 40 qd    severe prosthetic aortic valve stenosis  - cardio consult following  - conservative management    anemia  - iron studies c/w iron def  - FOBT  - iv iron  -  s/p transfusion 1 unit prbc    afib , AVR  - c/w metoprolol  - c/w eliquis 2.5 bid    CAD  - c/w plavix    COPD  - c/w inhalers    BPH  - flomax and finasteride    HLD  - c/w lipitor    dvt px  - on eliquis    d/c planning  - [facility]              89M with history of HTN, HLD, COPD (on intermittent 2L NC), CAD (no stents), s/p bioprosthetic AVR, CVA in 10/2022, s/p R total hip replacement ~30 years ago, afib/aflutter, hypothyroid, CKD, bladder cancer, and cholangiocarcinoma (s/p treatment, stable) presenting from home with worsening R hip pain. Accompanied by granddaughter at bedside. Patient was found to have UTI in 9/2023 and was treated for 1 month. In 10/2023, patient was found to be bacteremic with septic joint on R hip, was treated with 6 week course of IV antibiotics without surgical debridement or operation given age and other comorbidities. Patient was discharged from [place] to [facility], was able to work with PT appropriately. He finished last dose of IV antibiotics and was discharged home on Tuesday. He was seen by Dr. Redding in clinic yesterday and was being planned for IV antibiotics but has not started yet. He started having significant pain on the R hip again since discharge, now unable to bear weight. No fever or chills. No falls or trauma    right hip infection  - Blood cultures times 2 NGTD  -  to go home on po suppression with minocycline  - ID consult appreciated  - CT of right hip done  - no plans for surgery or drainage    aravind lower ext edema with dyspnea  - CXR  - doppler  negative  - iv lasix 40 qd    severe prosthetic aortic valve stenosis  - cardio consult following  - conservative management    anemia  - iron studies c/w iron def  - FOBT  - iv iron  -  s/p transfusion 1 unit prbc    afib , AVR  - c/w metoprolol  - c/w eliquis 2.5 bid    CAD  - c/w plavix    COPD  - c/w inhalers    BPH  - flomax and finasteride    HLD  - c/w lipitor    dvt px  - on eliquis    d/c planning  - [facility]

## 2023-12-13 NOTE — PROGRESS NOTE ADULT - ASSESSMENT
89 year old with multiple medical problems including    AVR  CVA  CAD  Bladder cancer  Cholangiocarcinoma  Rp hip replacement many yrs ago    pt had a rt THR infection about 10 years ago that was treated with debridement and IV ab and improved     Recently presented to  with pain and was found to have BC and Hip aspirate culture positive S. epidermidis   pt was not treated surgically  He received a 6 week course of daptomycin that was completed on Monday and was discharged from [facility]  I saw him in office and was going to  put him on suppressive po minocycline which the S epi was sensitive   Prior to getting the minocycline, he developed pain in the right hip and represented to the  ER    no fever or chills  no malaise but pain and inability to walk with out more pain    The patients hip pain is almost certainly from ongoing infection of the THR  Due to his age and comorbidities surgery was deferred and a MARGARETTE was not done      discussed  with ortho no surgery planned    to go home on po suppression with minocycline ( the organism was sensitive for ever       s      89 year old with multiple medical problems including    AVR  CVA  CAD  Bladder cancer  Cholangiocarcinoma  Rp hip replacement many yrs ago    pt had a rt THR infection about 10 years ago that was treated with debridement and IV ab and improved     Recently presented to  with pain and was found to have BC and Hip aspirate culture positive S. epidermidis   pt was not treated surgically  He received a 6 week course of daptomycin that was completed on Monday and was discharged from [facility]  I saw him in office and was going to  put him on suppressive po minocycline which the S epi was sensitive   Prior to getting the minocycline, he developed pain in the right hip and represented to the  ER    no fever or chills  no malaise but pain and inability to walk with out more pain    The patients hip pain is almost certainly from ongoing infection of the THR  Due to his age and comorbidities surgery was deferred and a MARGARETTE was not done      discussed  with ortho no surgery planned    to go home on po suppression with minocycline ( the organism was sensitive for ever       s

## 2023-12-13 NOTE — PROGRESS NOTE ADULT - SUBJECTIVE AND OBJECTIVE BOX
Subjective: Patient seen and examined. No new events except as noted.     REVIEW OF SYSTEMS:    CONSTITUTIONAL: No weakness, fevers or chills  EYES/ENT: No visual changes;  No vertigo or throat pain   NECK: No pain or stiffness  RESPIRATORY: No cough, wheezing, hemoptysis; No shortness of breath  CARDIOVASCULAR: No chest pain or palpitations  GASTROINTESTINAL: No abdominal or epigastric pain. No nausea, vomiting, or hematemesis; No diarrhea or constipation. No melena or hematochezia.  GENITOURINARY: No dysuria, frequency or hematuria  NEUROLOGICAL: No numbness or weakness  SKIN: No itching, burning, rashes, or lesions   All other review of systems is negative unless indicated above.    MEDICATIONS:  MEDICATIONS  (STANDING):  albuterol/ipratropium for Nebulization 3 milliLiter(s) Nebulizer every 6 hours  apixaban 2.5 milliGRAM(s) Oral two times a day  atorvastatin 40 milliGRAM(s) Oral at bedtime  finasteride 5 milliGRAM(s) Oral daily  furosemide   Injectable 40 milliGRAM(s) IV Push daily  levothyroxine 75 MICROGram(s) Oral daily  metoprolol succinate  milliGRAM(s) Oral two times a day  minocycline 100 milliGRAM(s) Oral every 12 hours  potassium chloride    Tablet ER 10 milliEquivalent(s) Oral daily  senna 2 Tablet(s) Oral at bedtime  tamsulosin 0.4 milliGRAM(s) Oral at bedtime  tiotropium 2.5 MICROgram(s) Inhaler 2 Puff(s) Inhalation daily      PHYSICAL EXAM:  T(C): 36.6 (12-13-23 @ 04:29), Max: 37.3 (12-12-23 @ 16:44)  HR: 82 (12-13-23 @ 04:29) (82 - 90)  BP: 109/68 (12-13-23 @ 04:29) (101/62 - 111/76)  RR: 18 (12-13-23 @ 04:29) (18 - 20)  SpO2: 95% (12-13-23 @ 04:29) (95% - 98%)  Wt(kg): --  I&O's Summary    12 Dec 2023 07:01  -  13 Dec 2023 07:00  --------------------------------------------------------  IN: 240 mL / OUT: 0 mL / NET: 240 mL              Appearance: NAD  HEENT:   Dry  oral mucosa, PERRL, EOMI	  Lymphatic: No lymphadenopathy  Cardiovascular: Normal S1 S2, No JVD, + systolic murmurs, No edema  Respiratory: Lungs clear to auscultation	  Psychiatry: A & O x 3, Mood & affect appropriate  Gastrointestinal:  Soft, Non-tender, + BS	  Skin: No rashes, No ecchymoses, No cyanosis	  Neurologic: Non-focal  Extremities: Normal range of motion, No clubbing, cyanosis or edema  Vascular: Peripheral pulses palpable 2+ bilaterally      LABS:    CARDIAC MARKERS:                                8.7    11.48 )-----------( 180      ( 13 Dec 2023 06:06 )             26.8           proBNP:   Lipid Profile:   HgA1c:   TSH:             TELEMETRY: 	    ECG:  	  RADIOLOGY:   DIAGNOSTIC TESTING:  [ ] Echocardiogram:  [ ]  Catheterization:  [ ] Stress Test:    OTHER:

## 2023-12-14 PROCEDURE — 99232 SBSQ HOSP IP/OBS MODERATE 35: CPT

## 2023-12-14 RX ADMIN — Medication 75 MICROGRAM(S): at 05:09

## 2023-12-14 RX ADMIN — Medication 3 MILLILITER(S): at 11:37

## 2023-12-14 RX ADMIN — Medication 10 MILLIEQUIVALENT(S): at 11:37

## 2023-12-14 RX ADMIN — Medication 100 MILLIGRAM(S): at 18:24

## 2023-12-14 RX ADMIN — Medication 100 MILLIGRAM(S): at 05:09

## 2023-12-14 RX ADMIN — TIOTROPIUM BROMIDE 2 PUFF(S): 18 CAPSULE ORAL; RESPIRATORY (INHALATION) at 11:38

## 2023-12-14 RX ADMIN — SENNA PLUS 2 TABLET(S): 8.6 TABLET ORAL at 21:02

## 2023-12-14 RX ADMIN — APIXABAN 2.5 MILLIGRAM(S): 2.5 TABLET, FILM COATED ORAL at 05:09

## 2023-12-14 RX ADMIN — Medication 3 MILLILITER(S): at 05:09

## 2023-12-14 RX ADMIN — Medication 3 MILLILITER(S): at 18:24

## 2023-12-14 RX ADMIN — ATORVASTATIN CALCIUM 40 MILLIGRAM(S): 80 TABLET, FILM COATED ORAL at 21:02

## 2023-12-14 RX ADMIN — MINOCYCLINE HYDROCHLORIDE 100 MILLIGRAM(S): 45 TABLET, EXTENDED RELEASE ORAL at 05:09

## 2023-12-14 RX ADMIN — Medication 40 MILLIGRAM(S): at 05:09

## 2023-12-14 RX ADMIN — MINOCYCLINE HYDROCHLORIDE 100 MILLIGRAM(S): 45 TABLET, EXTENDED RELEASE ORAL at 18:24

## 2023-12-14 RX ADMIN — TAMSULOSIN HYDROCHLORIDE 0.4 MILLIGRAM(S): 0.4 CAPSULE ORAL at 21:02

## 2023-12-14 RX ADMIN — APIXABAN 2.5 MILLIGRAM(S): 2.5 TABLET, FILM COATED ORAL at 18:24

## 2023-12-14 RX ADMIN — Medication 3 MILLILITER(S): at 23:12

## 2023-12-14 RX ADMIN — FINASTERIDE 5 MILLIGRAM(S): 5 TABLET, FILM COATED ORAL at 11:37

## 2023-12-14 NOTE — PROGRESS NOTE ADULT - PROBLEM SELECTOR PLAN 2
severe prosthetic aortic valve stenosis   appears compensated at present time   given advanced age and decreased mobility, would favor conservative management   will discuss further with family regarding invasive options.  cont with Lasix as ordered

## 2023-12-14 NOTE — PROGRESS NOTE ADULT - SUBJECTIVE AND OBJECTIVE BOX
DATE OF SERVICE: 12-14-23 @ 11:25    Patient is a 89y old  Male who presents with a chief complaint of fever (14 Dec 2023 08:58)      SUBJECTIVE / OVERNIGHT EVENTS:  No chest pain. No shortness of breath. No complaints. No events overnight.     MEDICATIONS  (STANDING):  albuterol/ipratropium for Nebulization 3 milliLiter(s) Nebulizer every 6 hours  apixaban 2.5 milliGRAM(s) Oral two times a day  atorvastatin 40 milliGRAM(s) Oral at bedtime  finasteride 5 milliGRAM(s) Oral daily  furosemide   Injectable 40 milliGRAM(s) IV Push daily  levothyroxine 75 MICROGram(s) Oral daily  metoprolol succinate  milliGRAM(s) Oral two times a day  minocycline 100 milliGRAM(s) Oral every 12 hours  potassium chloride    Tablet ER 10 milliEquivalent(s) Oral daily  senna 2 Tablet(s) Oral at bedtime  tamsulosin 0.4 milliGRAM(s) Oral at bedtime  tiotropium 2.5 MICROgram(s) Inhaler 2 Puff(s) Inhalation daily    MEDICATIONS  (PRN):  sodium chloride 0.65% Nasal 1 Spray(s) Both Nostrils four times a day PRN Nasal Congestion  traMADol 50 milliGRAM(s) Oral every 6 hours PRN Severe Pain (7 - 10)  traMADol 25 milliGRAM(s) Oral every 6 hours PRN Moderate Pain (4 - 6)      Vital Signs Last 24 Hrs  T(C): 37.1 (14 Dec 2023 09:23), Max: 37.2 (13 Dec 2023 20:04)  T(F): 98.8 (14 Dec 2023 09:23), Max: 99 (13 Dec 2023 20:04)  HR: 77 (14 Dec 2023 09:23) (74 - 78)  BP: 98/61 (14 Dec 2023 09:23) (97/56 - 103/61)  BP(mean): --  RR: 18 (14 Dec 2023 09:23) (18 - 18)  SpO2: 96% (14 Dec 2023 09:23) (96% - 98%)    Parameters below as of 14 Dec 2023 09:23  Patient On (Oxygen Delivery Method): nasal cannula  O2 Flow (L/min): 2    CAPILLARY BLOOD GLUCOSE        I&O's Summary    13 Dec 2023 07:01  -  14 Dec 2023 07:00  --------------------------------------------------------  IN: 120 mL / OUT: 200 mL / NET: -80 mL    14 Dec 2023 07:01  -  14 Dec 2023 11:25  --------------------------------------------------------  IN: 240 mL / OUT: 500 mL / NET: -260 mL        PHYSICAL EXAM:  GENERAL: NAD, well-developed  HEAD:  Atraumatic, Normocephalic  EYES: EOMI, PERRLA, conjunctiva and sclera clear  NECK: Supple, No JVD  CHEST/LUNG: Clear to auscultation bilaterally; No wheeze  HEART: Regular rate and rhythm; No murmurs, rubs, or gallops  ABDOMEN: Soft, Nontender, Nondistended; Bowel sounds present  EXTREMITIES:  2+ Peripheral Pulses, No clubbing, cyanosis, aravind edema  PSYCH: AAOx3  NEUROLOGY: non-focal  SKIN: No rashes or lesions    LABS:                        8.7    11.48 )-----------( 180      ( 13 Dec 2023 06:06 )             26.8                     RADIOLOGY & ADDITIONAL TESTS:    Imaging Personally Reviewed:    Consultant(s) Notes Reviewed:      Care Discussed with Consultants/Other Providers:

## 2023-12-14 NOTE — PROGRESS NOTE ADULT - ASSESSMENT
89 year old with multiple medical problems including    AVR  CVA  CAD  Bladder cancer  Cholangiocarcinoma  Rp hip replacement many yrs ago    pt had a rt THR infection about 10 years ago that was treated with debridement and IV ab and improved     Recently presented to  with pain and was found to have BC and Hip aspirate culture positive S. epidermidis   pt was not treated surgically  He received a 6 week course of daptomycin that was completed on Monday and was discharged from [facility] [place]  I saw him in office and was going to  put him on suppressive po minocycline which the S epi was sensitive   Prior to getting the minocycline, he developed pain in the right hip and represented to the  ER    no fever or chills  no malaise but pain and inability to walk with out more pain    The patients hip pain is almost certainly from ongoing infection of the THR  Due to his age and comorbidities surgery was deferred and a MARGARETTE was not done      discussed  with ortho no surgery planned    to go home on po suppression with minocycline ( the organism was sensitive for ever       s      89 year old with multiple medical problems including    AVR  CVA  CAD  Bladder cancer  Cholangiocarcinoma  Rp hip replacement many yrs ago    pt had a rt THR infection about 10 years ago that was treated with debridement and IV ab and improved     Recently presented to  with pain and was found to have BC and Hip aspirate culture positive S. epidermidis   pt was not treated surgically  He received a 6 week course of daptomycin that was completed on Monday and was discharged from [facility]  I saw him in office and was going to  put him on suppressive po minocycline which the S epi was sensitive   Prior to getting the minocycline, he developed pain in the right hip and represented to the  ER    no fever or chills  no malaise but pain and inability to walk with out more pain    The patients hip pain is almost certainly from ongoing infection of the THR  Due to his age and comorbidities surgery was deferred and a MARGARETTE was not done      discussed  with ortho no surgery planned    to go home on po suppression with minocycline ( the organism was sensitive for ever       s

## 2023-12-14 NOTE — PROGRESS NOTE ADULT - SUBJECTIVE AND OBJECTIVE BOX
Subjective: Patient seen and examined. No new events except as noted.     REVIEW OF SYSTEMS:    CONSTITUTIONAL: +weakness, fevers or chills  EYES/ENT: No visual changes;  No vertigo or throat pain   NECK: No pain or stiffness  RESPIRATORY: No cough, wheezing, hemoptysis; No shortness of breath  CARDIOVASCULAR: No chest pain or palpitations  GASTROINTESTINAL: No abdominal or epigastric pain. No nausea, vomiting, or hematemesis; No diarrhea or constipation. No melena or hematochezia.  GENITOURINARY: No dysuria, frequency or hematuria  NEUROLOGICAL: No numbness or weakness  SKIN: No itching, burning, rashes, or lesions   All other review of systems is negative unless indicated above.    MEDICATIONS:  MEDICATIONS  (STANDING):  albuterol/ipratropium for Nebulization 3 milliLiter(s) Nebulizer every 6 hours  apixaban 2.5 milliGRAM(s) Oral two times a day  atorvastatin 40 milliGRAM(s) Oral at bedtime  finasteride 5 milliGRAM(s) Oral daily  furosemide   Injectable 40 milliGRAM(s) IV Push daily  levothyroxine 75 MICROGram(s) Oral daily  metoprolol succinate  milliGRAM(s) Oral two times a day  minocycline 100 milliGRAM(s) Oral every 12 hours  potassium chloride    Tablet ER 10 milliEquivalent(s) Oral daily  senna 2 Tablet(s) Oral at bedtime  tamsulosin 0.4 milliGRAM(s) Oral at bedtime  tiotropium 2.5 MICROgram(s) Inhaler 2 Puff(s) Inhalation daily      PHYSICAL EXAM:  T(C): 37.1 (12-14-23 @ 09:23), Max: 37.2 (12-13-23 @ 20:04)  HR: 77 (12-14-23 @ 09:23) (74 - 78)  BP: 98/61 (12-14-23 @ 09:23) (97/56 - 103/61)  RR: 18 (12-14-23 @ 09:23) (18 - 18)  SpO2: 96% (12-14-23 @ 09:23) (96% - 98%)  Wt(kg): --  I&O's Summary    13 Dec 2023 07:01  -  14 Dec 2023 07:00  --------------------------------------------------------  IN: 120 mL / OUT: 200 mL / NET: -80 mL    14 Dec 2023 07:01  -  14 Dec 2023 11:28  --------------------------------------------------------  IN: 240 mL / OUT: 500 mL / NET: -260 mL          Appearance: NAD  HEENT:   Dry  oral mucosa, PERRL, EOMI	  Lymphatic: No lymphadenopathy  Cardiovascular: Normal S1 S2, No JVD, + systolic murmur  Respiratory: Lungs clear to auscultation	  Psychiatry: A & O x 3, Mood & affect appropriate  Gastrointestinal:  Soft, Non-tender, + BS	  Skin: No rashes, No ecchymoses, No cyanosis	  Neurologic: Non-focal  Extremities: Normal range of motion, No clubbing, cyanosis + edema  Vascular: Peripheral pulses palpable 2+ bilaterally          LABS:    CARDIAC MARKERS:                                8.7    11.48 )-----------( 180      ( 13 Dec 2023 06:06 )             26.8           proBNP:   Lipid Profile:   HgA1c:   TSH:             TELEMETRY: 	    ECG:  	  RADIOLOGY:   DIAGNOSTIC TESTING:  [ ] Echocardiogram:  [ ]  Catheterization:  [ ] Stress Test:    OTHER:

## 2023-12-14 NOTE — PROGRESS NOTE ADULT - ASSESSMENT
89M with history of HTN, HLD, COPD (on intermittent 2L NC), CAD (no stents), s/p bioprosthetic AVR, CVA in 10/2022, s/p R total hip replacement ~30 years ago, afib/aflutter, hypothyroid, CKD, bladder cancer, and cholangiocarcinoma (s/p treatment, stable) presenting from home with worsening R hip pain. Accompanied by granddaughter at bedside. Patient was found to have UTI in 9/2023 and was treated for 1 month. In 10/2023, patient was found to be bacteremic with septic joint on R hip, was treated with 6 week course of IV antibiotics without surgical debridement or operation given age and other comorbidities. Patient was discharged from [place] to [facility], was able to work with PT appropriately. He finished last dose of IV antibiotics and was discharged home on Tuesday. He was seen by Dr. Redding in clinic yesterday and was being planned for IV antibiotics but has not started yet. He started having significant pain on the R hip again since discharge, now unable to bear weight. No fever or chills. No falls or trauma    right hip infection  - Blood cultures times 2 NGTD  -  to go home on po suppression with minocycline  - ID consult appreciated  - CT of right hip done  - no plans for surgery or drainage    aravind lower ext edema with dyspnea  - CXR  - doppler  negative  - iv lasix 40 qd  - chane to po on discharge    severe prosthetic aortic valve stenosis  - cardio consult following  - conservative management    anemia  - iron studies c/w iron def  - FOBT  - iv iron  -  s/p transfusion 1 unit prbc    afib , AVR  - c/w metoprolol  - c/w eliquis 2.5 bid    CAD  - c/w plavix    COPD  - c/w inhalers    BPH  - flomax and finasteride    HLD  - c/w lipitor    dvt px  - on eliquis    d/c planning  - home  - d/w son and case management              89M with history of HTN, HLD, COPD (on intermittent 2L NC), CAD (no stents), s/p bioprosthetic AVR, CVA in 10/2022, s/p R total hip replacement ~30 years ago, afib/aflutter, hypothyroid, CKD, bladder cancer, and cholangiocarcinoma (s/p treatment, stable) presenting from home with worsening R hip pain. Accompanied by granddaughter at bedside. Patient was found to have UTI in 9/2023 and was treated for 1 month. In 10/2023, patient was found to be bacteremic with septic joint on R hip, was treated with 6 week course of IV antibiotics without surgical debridement or operation given age and other comorbidities. Patient was discharged from [place] to [facility], was able to work with PT appropriately. He finished last dose of IV antibiotics and was discharged home on Tuesday. He was seen by Dr. Redding in clinic yesterday and was being planned for IV antibiotics but has not started yet. He started having significant pain on the R hip again since discharge, now unable to bear weight. No fever or chills. No falls or trauma    right hip infection  - Blood cultures times 2 NGTD  -  to go home on po suppression with minocycline  - ID consult appreciated  - CT of right hip done  - no plans for surgery or drainage    aravind lower ext edema with dyspnea  - CXR  - doppler  negative  - iv lasix 40 qd  - chane to po on discharge    severe prosthetic aortic valve stenosis  - cardio consult following  - conservative management    anemia  - iron studies c/w iron def  - FOBT  - iv iron  -  s/p transfusion 1 unit prbc    afib , AVR  - c/w metoprolol  - c/w eliquis 2.5 bid    CAD  - c/w plavix    COPD  - c/w inhalers    BPH  - flomax and finasteride    HLD  - c/w lipitor    dvt px  - on eliquis    d/c planning  - home  - d/w son and case management

## 2023-12-14 NOTE — PROGRESS NOTE ADULT - ASSESSMENT
89M with history of HTN, HLD, COPD (on intermittent 2L NC), CAD (no stents), s/p bioprosthetic AVR, CVA in 10/2022, s/p R total hip replacement ~30 years ago, afib/aflutter, hypothyroid, CKD, bladder cancer, and cholangiocarcinoma (s/p treatment, stable) presenting from home with worsening R hip pain. Patient was found to have UTI in 9/2023 and was treated for 1 month. In 10/2023, patient was found to be bacteremic with septic joint on R hip, was treated with 6 week course of IV antibiotics without surgical debridement or operation given age and other comorbidities. Patient was discharged from [place] to [facility], was able to work with PT appropriately. He finished last dose of IV antibiotics and was discharged home on Tuesday. He was seen by Dr. Darby and was being planned for IV antibiotics but has not started yet. He started having significant pain on the R hip again, now unable to bear weight. No fever or chills. 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 Patient was found to have UTI in 9/2023 and was treated for 1 month. In 10/2023, patient was found to be bacteremic with septic joint on R hip, was treated with 6 week course of IV antibiotics without surgical debridement or operation given age and other comorbidities. Patient was discharged from [place] to [facility], was able to work with PT appropriately. He finished last dose of IV antibiotics and was discharged home on Tuesday. He was seen by Dr. Darby and was being planned for IV antibiotics but has not started yet. He started having significant pain on the R hip again, now unable to bear weight. No fever or chills. 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

## 2023-12-14 NOTE — PROGRESS NOTE ADULT - SUBJECTIVE AND OBJECTIVE BOX
infectious diseases progress note:    Patient is a 89y old  Male who presents with a chief complaint of wd (13 Dec 2023 10:22)        Pain of right hip             Allergies    doxycycline (Unknown)  vancomycin (Unknown)  penicillins (Other)    Intolerances        ANTIBIOTICS/RELEVANT:  antimicrobials  minocycline 100 milliGRAM(s) Oral every 12 hours    immunologic:    OTHER:  albuterol/ipratropium for Nebulization 3 milliLiter(s) Nebulizer every 6 hours  apixaban 2.5 milliGRAM(s) Oral two times a day  atorvastatin 40 milliGRAM(s) Oral at bedtime  finasteride 5 milliGRAM(s) Oral daily  furosemide   Injectable 40 milliGRAM(s) IV Push daily  levothyroxine 75 MICROGram(s) Oral daily  metoprolol succinate  milliGRAM(s) Oral two times a day  potassium chloride    Tablet ER 10 milliEquivalent(s) Oral daily  senna 2 Tablet(s) Oral at bedtime  sodium chloride 0.65% Nasal 1 Spray(s) Both Nostrils four times a day PRN  tamsulosin 0.4 milliGRAM(s) Oral at bedtime  tiotropium 2.5 MICROgram(s) Inhaler 2 Puff(s) Inhalation daily  traMADol 25 milliGRAM(s) Oral every 6 hours PRN  traMADol 50 milliGRAM(s) Oral every 6 hours PRN      Objective:  Vital Signs Last 24 Hrs  T(C): 36.6 (14 Dec 2023 04:28), Max: 37.2 (13 Dec 2023 20:04)  T(F): 97.8 (14 Dec 2023 04:28), Max: 99 (13 Dec 2023 20:04)  HR: 74 (14 Dec 2023 04:28) (74 - 78)  BP: 103/61 (14 Dec 2023 04:28) (97/56 - 103/61)  BP(mean): --  RR: 18 (14 Dec 2023 04:28) (18 - 18)  SpO2: 96% (14 Dec 2023 04:28) (96% - 98%)    Parameters below as of 14 Dec 2023 04:28  Patient On (Oxygen Delivery Method): nasal cannula  O2 Flow (L/min): 2         Ear/Nose/Throat: no oral lesion, no sinus tenderness on percussion	  Neck:no JVD, no lymphadenopathy, supple  Respiratory: CTA aravind  Cardiovascular: S1S2 RRR, no murmurs  Gastrointestinal:soft, (+) BS, no HSM  Extremities:no e/e/c        LABS:                        8.7    11.48 )-----------( 180      ( 13 Dec 2023 06:06 )             26.8                   MICROBIOLOGY:    RECENT CULTURES:  12-07 @ 11:27 .Blood Blood-Peripheral                No growth at 5 days    12-07 @ 10:45 .Blood Blood-Peripheral                No growth at 5 days    12-07 @ 10:30 .Blood Blood-Peripheral                No growth at 5 days          RESPIRATORY CULTURES:              RADIOLOGY & ADDITIONAL STUDIES:        Pager [phone]  After 5 pm/weekends or if no response :[phone] infectious diseases progress note:    Patient is a 89y old  Male who presents with a chief complaint of wd (13 Dec 2023 10:22)        Pain of right hip             Allergies    doxycycline (Unknown)  vancomycin (Unknown)  penicillins (Other)    Intolerances        ANTIBIOTICS/RELEVANT:  antimicrobials  minocycline 100 milliGRAM(s) Oral every 12 hours    immunologic:    OTHER:  albuterol/ipratropium for Nebulization 3 milliLiter(s) Nebulizer every 6 hours  apixaban 2.5 milliGRAM(s) Oral two times a day  atorvastatin 40 milliGRAM(s) Oral at bedtime  finasteride 5 milliGRAM(s) Oral daily  furosemide   Injectable 40 milliGRAM(s) IV Push daily  levothyroxine 75 MICROGram(s) Oral daily  metoprolol succinate  milliGRAM(s) Oral two times a day  potassium chloride    Tablet ER 10 milliEquivalent(s) Oral daily  senna 2 Tablet(s) Oral at bedtime  sodium chloride 0.65% Nasal 1 Spray(s) Both Nostrils four times a day PRN  tamsulosin 0.4 milliGRAM(s) Oral at bedtime  tiotropium 2.5 MICROgram(s) Inhaler 2 Puff(s) Inhalation daily  traMADol 25 milliGRAM(s) Oral every 6 hours PRN  traMADol 50 milliGRAM(s) Oral every 6 hours PRN      Objective:  Vital Signs Last 24 Hrs  T(C): 36.6 (14 Dec 2023 04:28), Max: 37.2 (13 Dec 2023 20:04)  T(F): 97.8 (14 Dec 2023 04:28), Max: 99 (13 Dec 2023 20:04)  HR: 74 (14 Dec 2023 04:28) (74 - 78)  BP: 103/61 (14 Dec 2023 04:28) (97/56 - 103/61)  BP(mean): --  RR: 18 (14 Dec 2023 04:28) (18 - 18)  SpO2: 96% (14 Dec 2023 04:28) (96% - 98%)    Parameters below as of 14 Dec 2023 04:28  Patient On (Oxygen Delivery Method): nasal cannula  O2 Flow (L/min): 2         Ear/Nose/Throat: no oral lesion, no sinus tenderness on percussion	  Neck:no JVD, no lymphadenopathy, supple  Respiratory: CTA aravind  Cardiovascular: S1S2 RRR, no murmurs  Gastrointestinal:soft, (+) BS, no HSM  Extremities:no e/e/c        LABS:                        8.7    11.48 )-----------( 180      ( 13 Dec 2023 06:06 )             26.8                   MICROBIOLOGY:    RECENT CULTURES:  12-07 @ 11:27 .Blood Blood-Peripheral                No growth at 5 days    12-07 @ 10:45 .Blood Blood-Peripheral                No growth at 5 days    12-07 @ 10:30 .Blood Blood-Peripheral                No growth at 5 days          RESPIRATORY CULTURES:              RADIOLOGY & ADDITIONAL STUDIES:        Pager [phone]  After 5 pm/weekends or if no response :[phone]

## 2023-12-15 DIAGNOSIS — R09.02 HYPOXEMIA: ICD-10-CM

## 2023-12-15 LAB
ANION GAP SERPL CALC-SCNC: 10 MMOL/L — SIGNIFICANT CHANGE UP (ref 5–17)
ANION GAP SERPL CALC-SCNC: 10 MMOL/L — SIGNIFICANT CHANGE UP (ref 5–17)
BUN SERPL-MCNC: 44 MG/DL — HIGH (ref 7–23)
BUN SERPL-MCNC: 44 MG/DL — HIGH (ref 7–23)
CALCIUM SERPL-MCNC: 9 MG/DL — SIGNIFICANT CHANGE UP (ref 8.4–10.5)
CALCIUM SERPL-MCNC: 9 MG/DL — SIGNIFICANT CHANGE UP (ref 8.4–10.5)
CHLORIDE SERPL-SCNC: 98 MMOL/L — SIGNIFICANT CHANGE UP (ref 96–108)
CHLORIDE SERPL-SCNC: 98 MMOL/L — SIGNIFICANT CHANGE UP (ref 96–108)
CO2 SERPL-SCNC: 30 MMOL/L — SIGNIFICANT CHANGE UP (ref 22–31)
CO2 SERPL-SCNC: 30 MMOL/L — SIGNIFICANT CHANGE UP (ref 22–31)
CREAT SERPL-MCNC: 1.45 MG/DL — HIGH (ref 0.5–1.3)
CREAT SERPL-MCNC: 1.45 MG/DL — HIGH (ref 0.5–1.3)
EGFR: 44 ML/MIN/1.73M2 — LOW
EGFR: 44 ML/MIN/1.73M2 — LOW
GLUCOSE SERPL-MCNC: 100 MG/DL — HIGH (ref 70–99)
GLUCOSE SERPL-MCNC: 100 MG/DL — HIGH (ref 70–99)
HCT VFR BLD CALC: 28.5 % — LOW (ref 39–50)
HCT VFR BLD CALC: 28.5 % — LOW (ref 39–50)
HGB BLD-MCNC: 8.9 G/DL — LOW (ref 13–17)
HGB BLD-MCNC: 8.9 G/DL — LOW (ref 13–17)
MCHC RBC-ENTMCNC: 31.2 GM/DL — LOW (ref 32–36)
MCHC RBC-ENTMCNC: 31.2 GM/DL — LOW (ref 32–36)
MCHC RBC-ENTMCNC: 31.4 PG — SIGNIFICANT CHANGE UP (ref 27–34)
MCHC RBC-ENTMCNC: 31.4 PG — SIGNIFICANT CHANGE UP (ref 27–34)
MCV RBC AUTO: 100.7 FL — HIGH (ref 80–100)
MCV RBC AUTO: 100.7 FL — HIGH (ref 80–100)
NRBC # BLD: 0 /100 WBCS — SIGNIFICANT CHANGE UP (ref 0–0)
NRBC # BLD: 0 /100 WBCS — SIGNIFICANT CHANGE UP (ref 0–0)
PLATELET # BLD AUTO: 176 K/UL — SIGNIFICANT CHANGE UP (ref 150–400)
PLATELET # BLD AUTO: 176 K/UL — SIGNIFICANT CHANGE UP (ref 150–400)
POTASSIUM SERPL-MCNC: 4.4 MMOL/L — SIGNIFICANT CHANGE UP (ref 3.5–5.3)
POTASSIUM SERPL-MCNC: 4.4 MMOL/L — SIGNIFICANT CHANGE UP (ref 3.5–5.3)
POTASSIUM SERPL-SCNC: 4.4 MMOL/L — SIGNIFICANT CHANGE UP (ref 3.5–5.3)
POTASSIUM SERPL-SCNC: 4.4 MMOL/L — SIGNIFICANT CHANGE UP (ref 3.5–5.3)
RAPID RVP RESULT: DETECTED
RAPID RVP RESULT: DETECTED
RBC # BLD: 2.83 M/UL — LOW (ref 4.2–5.8)
RBC # BLD: 2.83 M/UL — LOW (ref 4.2–5.8)
RBC # FLD: 16.6 % — HIGH (ref 10.3–14.5)
RBC # FLD: 16.6 % — HIGH (ref 10.3–14.5)
SARS-COV-2 RNA SPEC QL NAA+PROBE: DETECTED
SARS-COV-2 RNA SPEC QL NAA+PROBE: DETECTED
SODIUM SERPL-SCNC: 138 MMOL/L — SIGNIFICANT CHANGE UP (ref 135–145)
SODIUM SERPL-SCNC: 138 MMOL/L — SIGNIFICANT CHANGE UP (ref 135–145)
WBC # BLD: 8.62 K/UL — SIGNIFICANT CHANGE UP (ref 3.8–10.5)
WBC # BLD: 8.62 K/UL — SIGNIFICANT CHANGE UP (ref 3.8–10.5)
WBC # FLD AUTO: 8.62 K/UL — SIGNIFICANT CHANGE UP (ref 3.8–10.5)
WBC # FLD AUTO: 8.62 K/UL — SIGNIFICANT CHANGE UP (ref 3.8–10.5)

## 2023-12-15 RX ADMIN — FINASTERIDE 5 MILLIGRAM(S): 5 TABLET, FILM COATED ORAL at 12:39

## 2023-12-15 RX ADMIN — ATORVASTATIN CALCIUM 40 MILLIGRAM(S): 80 TABLET, FILM COATED ORAL at 21:04

## 2023-12-15 RX ADMIN — Medication 3 MILLILITER(S): at 12:39

## 2023-12-15 RX ADMIN — Medication 100 MILLIGRAM(S): at 17:22

## 2023-12-15 RX ADMIN — Medication 3 MILLILITER(S): at 23:00

## 2023-12-15 RX ADMIN — Medication 3 MILLILITER(S): at 05:06

## 2023-12-15 RX ADMIN — SENNA PLUS 2 TABLET(S): 8.6 TABLET ORAL at 21:04

## 2023-12-15 RX ADMIN — Medication 3 MILLILITER(S): at 17:23

## 2023-12-15 RX ADMIN — TAMSULOSIN HYDROCHLORIDE 0.4 MILLIGRAM(S): 0.4 CAPSULE ORAL at 21:04

## 2023-12-15 RX ADMIN — Medication 75 MICROGRAM(S): at 05:06

## 2023-12-15 RX ADMIN — APIXABAN 2.5 MILLIGRAM(S): 2.5 TABLET, FILM COATED ORAL at 05:06

## 2023-12-15 RX ADMIN — Medication 10 MILLIEQUIVALENT(S): at 12:39

## 2023-12-15 RX ADMIN — MINOCYCLINE HYDROCHLORIDE 100 MILLIGRAM(S): 45 TABLET, EXTENDED RELEASE ORAL at 17:22

## 2023-12-15 RX ADMIN — Medication 40 MILLIGRAM(S): at 05:07

## 2023-12-15 RX ADMIN — MINOCYCLINE HYDROCHLORIDE 100 MILLIGRAM(S): 45 TABLET, EXTENDED RELEASE ORAL at 05:06

## 2023-12-15 RX ADMIN — Medication 100 MILLIGRAM(S): at 05:06

## 2023-12-15 RX ADMIN — TIOTROPIUM BROMIDE 2 PUFF(S): 18 CAPSULE ORAL; RESPIRATORY (INHALATION) at 13:34

## 2023-12-15 RX ADMIN — APIXABAN 2.5 MILLIGRAM(S): 2.5 TABLET, FILM COATED ORAL at 17:22

## 2023-12-15 NOTE — CONSULT NOTE ADULT - PROBLEM SELECTOR RECOMMENDATION 2
-Abx as per ID  -D/c planning with hospice per primary team
severe prosthetic aortic valve stenosis   appears compensated at present time   given advanced age and decreased mobility, would favor conservative management   will discuss further with family regarding invasive options

## 2023-12-15 NOTE — PROGRESS NOTE ADULT - ASSESSMENT
89M with history of HTN, HLD, COPD (on intermittent 2L NC), CAD (no stents), s/p bioprosthetic AVR, CVA in 10/2022, s/p R total hip replacement ~30 years ago, afib/aflutter, hypothyroid, CKD, bladder cancer, and cholangiocarcinoma (s/p treatment, stable) presenting from home with worsening R hip pain. Accompanied by granddaughter at bedside. Patient was found to have UTI in 9/2023 and was treated for 1 month. In 10/2023, patient was found to be bacteremic with septic joint on R hip, was treated with 6 week course of IV antibiotics without surgical debridement or operation given age and other comorbidities. Patient was discharged from [place] to [facility], was able to work with PT appropriately. He finished last dose of IV antibiotics and was discharged home on Tuesday. He was seen by Dr. Redding in clinic yesterday and was being planned for IV antibiotics but has not started yet. He started having significant pain on the R hip again since discharge, now unable to bear weight. No fever or chills. No falls or trauma    right prosthetic  hip infection  - Blood cultures times 2 NGTD  -  to go home on po suppression with minocycline  - ID consult appreciated  - CT of right hip done  - no plans for surgery or drainage    cough  - c/w duoneb  - pulm called    aravind lower ext edema with dyspnea  - CXR  - doppler  negative  - iv lasix 40 qd  - change to po on discharge    severe prosthetic aortic valve stenosis  - cardio consult following  - conservative management    anemia  - iron studies c/w iron def  - FOBT  - iv iron  -  s/p transfusion 1 unit prbc    afib , AVR  - c/w metoprolol  - c/w eliquis 2.5 bid    CAD  - c/w plavix    COPD  - c/w inhalers    BPH  - flomax and finasteride    HLD  - c/w lipitor    dvt px  - on eliquis    d/c planning  - home  - d/w son and case management              89M with history of HTN, HLD, COPD (on intermittent 2L NC), CAD (no stents), s/p bioprosthetic AVR, CVA in 10/2022, s/p R total hip replacement ~30 years ago, afib/aflutter, hypothyroid, CKD, bladder cancer, and cholangiocarcinoma (s/p treatment, stable) presenting from home with worsening R hip pain. Accompanied by granddaughter at bedside. Patient was found to have UTI in 9/2023 and was treated for 1 month. In 10/2023, patient was found to be bacteremic with septic joint on R hip, was treated with 6 week course of IV antibiotics without surgical debridement or operation given age and other comorbidities. Patient was discharged from [place] to [facility], was able to work with PT appropriately. He finished last dose of IV antibiotics and was discharged home on Tuesday. He was seen by Dr. Redding in clinic yesterday and was being planned for IV antibiotics but has not started yet. He started having significant pain on the R hip again since discharge, now unable to bear weight. No fever or chills. No falls or trauma    right prosthetic  hip infection  - Blood cultures times 2 NGTD  -  to go home on po suppression with minocycline  - ID consult appreciated  - CT of right hip done  - no plans for surgery or drainage    cough  - c/w duoneb  - pulm called    aravind lower ext edema with dyspnea  - CXR  - doppler  negative  - iv lasix 40 qd  - change to po on discharge    severe prosthetic aortic valve stenosis  - cardio consult following  - conservative management    anemia  - iron studies c/w iron def  - FOBT  - iv iron  -  s/p transfusion 1 unit prbc    afib , AVR  - c/w metoprolol  - c/w eliquis 2.5 bid    CAD  - c/w plavix    COPD  - c/w inhalers    BPH  - flomax and finasteride    HLD  - c/w lipitor    dvt px  - on eliquis    d/c planning  - home  - d/w son and case management

## 2023-12-15 NOTE — CONSULT NOTE ADULT - PROBLEM SELECTOR RECOMMENDATION 9
IV abx   Ortho consulted
-Recently discharged from rehab with o2 PRN  -Mild congestion on CXR. Keep O>I as tolerated.   -O2 lowered to 1LNC  -Check o2 sats on RA and document. Keep sats >88% with o2 PRN. Pt will likely require o2 qHS and PRN.   -?Underlying COPD  -Continue Duoneb q6h while inpatient, change to PRN on discharge. Please assure pt has rx for nebs and nebulizer machine on discharge.

## 2023-12-15 NOTE — PROGRESS NOTE ADULT - SUBJECTIVE AND OBJECTIVE BOX
Subjective: Patient seen and examined. No new events except as noted.     REVIEW OF SYSTEMS:    CONSTITUTIONAL: +weakness, fevers or chills  EYES/ENT: No visual changes;  No vertigo or throat pain   NECK: No pain or stiffness  RESPIRATORY: No cough, wheezing, hemoptysis; No shortness of breath  CARDIOVASCULAR: No chest pain or palpitations  GASTROINTESTINAL: No abdominal or epigastric pain. No nausea, vomiting, or hematemesis; No diarrhea or constipation. No melena or hematochezia.  GENITOURINARY: No dysuria, frequency or hematuria  NEUROLOGICAL: No numbness or weakness  SKIN: No itching, burning, rashes, or lesions   All other review of systems is negative unless indicated above.    MEDICATIONS:  MEDICATIONS  (STANDING):  albuterol/ipratropium for Nebulization 3 milliLiter(s) Nebulizer every 6 hours  apixaban 2.5 milliGRAM(s) Oral two times a day  atorvastatin 40 milliGRAM(s) Oral at bedtime  finasteride 5 milliGRAM(s) Oral daily  furosemide   Injectable 40 milliGRAM(s) IV Push daily  levothyroxine 75 MICROGram(s) Oral daily  metoprolol succinate  milliGRAM(s) Oral two times a day  minocycline 100 milliGRAM(s) Oral every 12 hours  potassium chloride    Tablet ER 10 milliEquivalent(s) Oral daily  senna 2 Tablet(s) Oral at bedtime  tamsulosin 0.4 milliGRAM(s) Oral at bedtime  tiotropium 2.5 MICROgram(s) Inhaler 2 Puff(s) Inhalation daily      PHYSICAL EXAM:  T(C): 36.7 (12-15-23 @ 09:20), Max: 36.7 (12-15-23 @ 04:10)  HR: 71 (12-15-23 @ 09:20) (71 - 76)  BP: 98/63 (12-15-23 @ 09:20) (98/63 - 104/63)  RR: 18 (12-15-23 @ 09:20) (18 - 18)  SpO2: 94% (12-15-23 @ 09:20) (94% - 97%)  Wt(kg): --  I&O's Summary    14 Dec 2023 07:01  -  15 Dec 2023 07:00  --------------------------------------------------------  IN: 780 mL / OUT: 1570 mL / NET: -790 mL    15 Dec 2023 07:01  -  15 Dec 2023 09:35  --------------------------------------------------------  IN: 480 mL / OUT: 401 mL / NET: 79 mL      Appearance: NAD  HEENT:   Dry  oral mucosa, PERRL, EOMI	  Lymphatic: No lymphadenopathy  Cardiovascular: Normal S1 S2, No JVD, + systolic murmur  Respiratory: Lungs clear to auscultation	  Psychiatry: A & O x 3, Mood & affect appropriate  Gastrointestinal:  Soft, Non-tender, + BS	  Skin: No rashes, No ecchymoses, No cyanosis	  Neurologic: Non-focal  Extremities: Normal range of motion, No clubbing, cyanosis + edema  Vascular: Peripheral pulses palpable 2+ bilaterally            LABS:    CARDIAC MARKERS:                                8.9    8.62  )-----------( 176      ( 15 Dec 2023 07:06 )             28.5     12-15    138  |  98  |  44<H>  ----------------------------<  100<H>  4.4   |  30  |  1.45<H>    Ca    9.0      15 Dec 2023 07:06      TELEMETRY: 	    ECG:  	  RADIOLOGY:   DIAGNOSTIC TESTING:  [ ] Echocardiogram:  [ ]  Catheterization:  [ ] Stress Test:    OTHER:

## 2023-12-15 NOTE — CONSULT NOTE ADULT - ASSESSMENT
89 y/o M with PMH of HTN, HLD, ?COPD (was discharged from rehab with o2 2LNC qHS and PRN), CAD (no stents), s/p bioprosthetic AVR, CVA in 10/2022, s/p R total hip replacement ~30 years ago, afib/aflutter, hypothyroid, CKD, bladder cancer, and cholangiocarcinoma (s/p treatment, stable) presenting from home with worsening R hip pain. Patient was found to have UTI in 9/2023 and was treated for 1 month. In 10/2023, patient was found to be bacteremic with septic joint on R hip, was treated with 6 week course of IV antibiotics without surgical debridement or operation given age and other comorbidities. Concern for hip infection, no plan for surgery given comorbidities. Called to consult for mild cough and o2 requirements.  89 y/o M with PMH of HTN, HLD, ?COPD (was discharged from rehab with o2 2LNC qHS and PRN), CAD (no stents), s/p bioprosthetic AVR, CVA in 10/2022, s/p R total hip replacement ~30 years ago, afib/aflutter, hypothyroid, CKD, bladder cancer, and cholangiocarcinoma (s/p treatment, stable) presenting from home with worsening R hip pain. Patient was found to have UTI in 9/2023 and was treated for 1 month. In 10/2023, patient was found to be bacteremic with septic joint on R hip, was treated with 6 week course of IV antibiotics without surgical debridement or operation given age and other comorbidities. Concern for hip infection, no plan for surgery given comorbidities. Called to consult for mild cough and o2 requirements.

## 2023-12-15 NOTE — CONSULT NOTE ADULT - PROVIDER SPECIALTY LIST ADULT
Infectious Disease
Pulmonology
Orthopedics
Cardiology
Rituxan Counseling:  I discussed with the patient the risks of Rituxan infusions. Side effects can include infusion reactions, severe drug rashes including mucocutaneous reactions, reactivation of latent hepatitis and other infections and rarely progressive multifocal leukoencephalopathy.  All of the patient's questions and concerns were addressed.

## 2023-12-15 NOTE — PROGRESS NOTE ADULT - ASSESSMENT
89M with history of HTN, HLD, COPD (on intermittent 2L NC), CAD (no stents), s/p bioprosthetic AVR, CVA in 10/2022, s/p R total hip replacement ~30 years ago, afib/aflutter, hypothyroid, CKD, bladder cancer, and cholangiocarcinoma (s/p treatment, stable) presenting from home with worsening R hip pain. Patient was found to have UTI in 9/2023 and was treated for 1 month. In 10/2023, patient was found to be bacteremic with septic joint on R hip, was treated with 6 week course of IV antibiotics without surgical debridement or operation given age and other comorbidities. Patient was discharged from [place] to [facility], was able to work with PT appropriately. He finished last dose of IV antibiotics and was discharged home on Tuesday. He was seen by Dr. Darby and was being planned for IV antibiotics but has not started yet. He started having significant pain on the R hip again, now unable to bear weight. No fever or chills. 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 Patient was found to have UTI in 9/2023 and was treated for 1 month. In 10/2023, patient was found to be bacteremic with septic joint on R hip, was treated with 6 week course of IV antibiotics without surgical debridement or operation given age and other comorbidities. Patient was discharged from [place] to [facility], was able to work with PT appropriately. He finished last dose of IV antibiotics and was discharged home on Tuesday. He was seen by Dr. Darby and was being planned for IV antibiotics but has not started yet. He started having significant pain on the R hip again, now unable to bear weight. No fever or chills. 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

## 2023-12-15 NOTE — PROGRESS NOTE ADULT - PROBLEM SELECTOR PLAN 2
severe prosthetic aortic valve stenosis   appears compensated at present time   given advanced age and decreased mobility, would favor conservative management   cont with Lasix as ordered  Change to Laix 40 PO daily upon discharge

## 2023-12-15 NOTE — PROGRESS NOTE ADULT - SUBJECTIVE AND OBJECTIVE BOX
ORTHOPEDIC PROGRESS NOTE    Overnight events: None    SUBJECTIVE: Pt seen and examined at bedside. Patient is doing well, no acute complaints this AM. Pain is controlled with medication      OBJECTIVE:  Vital Signs Last 24 Hrs  T(C): 36.6 (14 Dec 2023 19:50), Max: 37.1 (14 Dec 2023 09:23)  T(F): 97.8 (14 Dec 2023 19:50), Max: 98.8 (14 Dec 2023 09:23)  HR: 76 (14 Dec 2023 19:50) (74 - 77)  BP: 101/62 (14 Dec 2023 19:50) (98/61 - 103/61)  BP(mean): --  RR: 18 (14 Dec 2023 19:50) (18 - 18)  SpO2: 97% (14 Dec 2023 19:50) (96% - 97%)    Parameters below as of 14 Dec 2023 19:50  Patient On (Oxygen Delivery Method): nasal cannula  O2 Flow (L/min): 2        12-13-23 @ 07:01  -  12-14-23 @ 07:00  --------------------------------------------------------  IN: 120 mL / OUT: 200 mL / NET: -80 mL    12-14-23 @ 07:01  -  12-15-23 @ 02:15  --------------------------------------------------------  IN: 720 mL / OUT: 1270 mL / NET: -550 mL        Physical Examination:  GEN: NAD, resting quietly  PULM: symmetric chest rise bilaterally, no increased WOB  ABD: nondistended  EXTR:   Right Lower Extremity:  minimal pain with ROM of R hip  5/5 EHL/FHL/TA/GS  SILT L3-S1  +DP/PT Pulses  Compartments soft  No calf TTP B/L      LABS:                        8.7    11.48 )-----------( 180      ( 13 Dec 2023 06:06 )             26.8

## 2023-12-15 NOTE — CONSULT NOTE ADULT - SUBJECTIVE AND OBJECTIVE BOX
PULMONARY CONSULT    HPI: 89 y/o M with PMH of HTN, HLD, ?COPD (was discharged from rehab with o2 2LNC qHS and PRN), CAD (no stents), s/p bioprosthetic AVR, CVA in 10/2022, s/p R total hip replacement ~30 years ago, afib/aflutter, hypothyroid, CKD, bladder cancer, and cholangiocarcinoma (s/p treatment, stable) presenting from home with worsening R hip pain. Patient was found to have UTI in 9/2023 and was treated for 1 month. In 10/2023, patient was found to be bacteremic with septic joint on R hip, was treated with 6 week course of IV antibiotics without surgical debridement or operation given age and other comorbidities. Patient was discharged from [place] to [facility], was able to work with PT appropriately.     He finished last dose of IV antibiotics and was discharged home on Tuesday. He was seen by Dr. Redding in clinic yesterday and was being planned for IV antibiotics but has not started yet. He started having significant pain on the R hip again since discharge, now unable to bear weight. No fever or chills. No falls or trauma (07 Dec 2023 18:33)        PAST MEDICAL & SURGICAL HISTORY:  Atrial fibrillation      Atrial flutter      CAD (coronary artery disease)      S/P aortic valve replacement with bioprosthetic valve      History of COPD      CVA (cerebrovascular accident)      Hypothyroid      Bladder cancer      Cholangiocarcinoma      S/P hip replacement, right        Allergies    doxycycline (Unknown)  vancomycin (Unknown)  penicillins (Other)    Intolerances      FAMILY HISTORY:    Social history:     Review of Systems:  CONSTITUTIONAL: No fever, chills, or fatigue  EYES: No eye pain, visual disturbances, or discharge  ENMT:  No difficulty hearing, tinnitus, vertigo; No sinus or throat pain  NECK: No pain or stiffness  RESPIRATORY: Per above  CARDIOVASCULAR: No chest pain, palpitations, dizziness, or leg swelling  GASTROINTESTINAL: No abdominal or epigastric pain. No nausea, vomiting, or hematemesis; No diarrhea or constipation. No melena or hematochezia.  GENITOURINARY: No dysuria, frequency, hematuria, or incontinence  NEUROLOGICAL: No headaches, memory loss, loss of strength, numbness, or tremors  SKIN: No itching, burning, rashes, or lesions   MUSCULOSKELETAL: No joint pain or swelling; No muscle, back, or extremity pain  PSYCHIATRIC: No depression, anxiety, mood swings, or difficulty sleeping      Medications:  MEDICATIONS  (STANDING):  albuterol/ipratropium for Nebulization 3 milliLiter(s) Nebulizer every 6 hours  apixaban 2.5 milliGRAM(s) Oral two times a day  atorvastatin 40 milliGRAM(s) Oral at bedtime  finasteride 5 milliGRAM(s) Oral daily  furosemide   Injectable 40 milliGRAM(s) IV Push daily  levothyroxine 75 MICROGram(s) Oral daily  metoprolol succinate  milliGRAM(s) Oral two times a day  minocycline 100 milliGRAM(s) Oral every 12 hours  potassium chloride    Tablet ER 10 milliEquivalent(s) Oral daily  senna 2 Tablet(s) Oral at bedtime  tamsulosin 0.4 milliGRAM(s) Oral at bedtime  tiotropium 2.5 MICROgram(s) Inhaler 2 Puff(s) Inhalation daily    MEDICATIONS  (PRN):  sodium chloride 0.65% Nasal 1 Spray(s) Both Nostrils four times a day PRN Nasal Congestion  traMADol 25 milliGRAM(s) Oral every 6 hours PRN Moderate Pain (4 - 6)  traMADol 50 milliGRAM(s) Oral every 6 hours PRN Severe Pain (7 - 10)            Vital Signs Last 24 Hrs  T(C): 36.7 (15 Dec 2023 09:20), Max: 36.7 (15 Dec 2023 04:10)  T(F): 98.1 (15 Dec 2023 09:20), Max: 98.1 (15 Dec 2023 04:10)  HR: 71 (15 Dec 2023 09:20) (71 - 76)  BP: 98/63 (15 Dec 2023 09:20) (98/63 - 104/63)  BP(mean): --  RR: 18 (15 Dec 2023 09:20) (18 - 18)  SpO2: 94% (15 Dec 2023 09:20) (94% - 97%)    Parameters below as of 15 Dec 2023 09:20  Patient On (Oxygen Delivery Method): nasal cannula  O2 Flow (L/min): 2              12-14 @ 07:01  -  12-15 @ 07:00  --------------------------------------------------------  IN: 780 mL / OUT: 1570 mL / NET: -790 mL          LABS:                        8.9    8.62  )-----------( 176      ( 15 Dec 2023 07:06 )             28.5     12-15    138  |  98  |  44<H>  ----------------------------<  100<H>  4.4   |  30  |  1.45<H>    Ca    9.0      15 Dec 2023 07:06              Urinalysis Basic - ( 15 Dec 2023 07:06 )    Color: x / Appearance: x / SG: x / pH: x  Gluc: 100 mg/dL / Ketone: x  / Bili: x / Urobili: x   Blood: x / Protein: x / Nitrite: x   Leuk Esterase: x / RBC: x / WBC x   Sq Epi: x / Non Sq Epi: x / Bacteria: x                    CULTURES:     (collected 12-07-23 @ 11:27)  Source: .Blood Blood-Peripheral  Final Report (12-12-23 @ 18:00):    No growth at 5 days     (collected 12-07-23 @ 10:45)  Source: .Blood Blood-Peripheral  Final Report (12-12-23 @ 18:00):    No growth at 5 days     (collected 12-07-23 @ 10:30)  Source: .Blood Blood-Peripheral  Final Report (12-12-23 @ 18:00):    No growth at 5 days              Physical Examination:    General: No acute distress.      HEENT: Pupils equal, reactive to light.  Symmetric.    PULM: Clear to auscultation bilaterally, no significant sputum production    CVS: S1, S2    ABD: Soft, nondistended, nontender, normoactive bowel sounds, no masses    EXT: No edema, nontender    SKIN: Warm and well perfused, no rashes noted.    NEURO: Alert, oriented, interactive, nonfocal    RADIOLOGY REVIEWED  CXR:    CT chest:    TTE:   PULMONARY CONSULT    HPI: 89 y/o M with PMH of HTN, HLD, ?COPD (was discharged from rehab with o2 2LNC qHS and PRN), CAD (no stents), s/p bioprosthetic AVR, CVA in 10/2022, s/p R total hip replacement ~30 years ago, afib/aflutter, hypothyroid, CKD, bladder cancer, and cholangiocarcinoma (s/p treatment, stable) presenting from home with worsening R hip pain. Patient was found to have UTI in 9/2023 and was treated for 1 month. In 10/2023, patient was found to be bacteremic with septic joint on R hip, was treated with 6 week course of IV antibiotics without surgical debridement or operation given age and other comorbidities. Patient was discharged from [place] to [facility], was able to work with PT appropriately.     He finished last dose of IV antibiotics and was discharged home on Tuesday. He was seen by Dr. Redding in clinic yesterday and was being planned for IV antibiotics but has not started yet. He started having significant pain on the R hip again since discharge, now unable to bear weight. No fever or chills. No falls or trauma (07 Dec 2023 18:33)        PAST MEDICAL & SURGICAL HISTORY:  Atrial fibrillation      Atrial flutter      CAD (coronary artery disease)      S/P aortic valve replacement with bioprosthetic valve      History of COPD      CVA (cerebrovascular accident)      Hypothyroid      Bladder cancer      Cholangiocarcinoma      S/P hip replacement, right        Allergies    doxycycline (Unknown)  vancomycin (Unknown)  penicillins (Other)    Intolerances      FAMILY HISTORY:    Social history:     Review of Systems:  CONSTITUTIONAL: No fever, chills, or fatigue  EYES: No eye pain, visual disturbances, or discharge  ENMT:  No difficulty hearing, tinnitus, vertigo; No sinus or throat pain  NECK: No pain or stiffness  RESPIRATORY: Per above  CARDIOVASCULAR: No chest pain, palpitations, dizziness, or leg swelling  GASTROINTESTINAL: No abdominal or epigastric pain. No nausea, vomiting, or hematemesis; No diarrhea or constipation. No melena or hematochezia.  GENITOURINARY: No dysuria, frequency, hematuria, or incontinence  NEUROLOGICAL: No headaches, memory loss, loss of strength, numbness, or tremors  SKIN: No itching, burning, rashes, or lesions   MUSCULOSKELETAL: No joint pain or swelling; No muscle, back, or extremity pain  PSYCHIATRIC: No depression, anxiety, mood swings, or difficulty sleeping      Medications:  MEDICATIONS  (STANDING):  albuterol/ipratropium for Nebulization 3 milliLiter(s) Nebulizer every 6 hours  apixaban 2.5 milliGRAM(s) Oral two times a day  atorvastatin 40 milliGRAM(s) Oral at bedtime  finasteride 5 milliGRAM(s) Oral daily  furosemide   Injectable 40 milliGRAM(s) IV Push daily  levothyroxine 75 MICROGram(s) Oral daily  metoprolol succinate  milliGRAM(s) Oral two times a day  minocycline 100 milliGRAM(s) Oral every 12 hours  potassium chloride    Tablet ER 10 milliEquivalent(s) Oral daily  senna 2 Tablet(s) Oral at bedtime  tamsulosin 0.4 milliGRAM(s) Oral at bedtime  tiotropium 2.5 MICROgram(s) Inhaler 2 Puff(s) Inhalation daily    MEDICATIONS  (PRN):  sodium chloride 0.65% Nasal 1 Spray(s) Both Nostrils four times a day PRN Nasal Congestion  traMADol 25 milliGRAM(s) Oral every 6 hours PRN Moderate Pain (4 - 6)  traMADol 50 milliGRAM(s) Oral every 6 hours PRN Severe Pain (7 - 10)            Vital Signs Last 24 Hrs  T(C): 36.7 (15 Dec 2023 09:20), Max: 36.7 (15 Dec 2023 04:10)  T(F): 98.1 (15 Dec 2023 09:20), Max: 98.1 (15 Dec 2023 04:10)  HR: 71 (15 Dec 2023 09:20) (71 - 76)  BP: 98/63 (15 Dec 2023 09:20) (98/63 - 104/63)  BP(mean): --  RR: 18 (15 Dec 2023 09:20) (18 - 18)  SpO2: 94% (15 Dec 2023 09:20) (94% - 97%)    Parameters below as of 15 Dec 2023 09:20  Patient On (Oxygen Delivery Method): nasal cannula  O2 Flow (L/min): 2              12-14 @ 07:01  -  12-15 @ 07:00  --------------------------------------------------------  IN: 780 mL / OUT: 1570 mL / NET: -790 mL          LABS:                        8.9    8.62  )-----------( 176      ( 15 Dec 2023 07:06 )             28.5     12-15    138  |  98  |  44<H>  ----------------------------<  100<H>  4.4   |  30  |  1.45<H>    Ca    9.0      15 Dec 2023 07:06              Urinalysis Basic - ( 15 Dec 2023 07:06 )    Color: x / Appearance: x / SG: x / pH: x  Gluc: 100 mg/dL / Ketone: x  / Bili: x / Urobili: x   Blood: x / Protein: x / Nitrite: x   Leuk Esterase: x / RBC: x / WBC x   Sq Epi: x / Non Sq Epi: x / Bacteria: x                    CULTURES:     (collected 12-07-23 @ 11:27)  Source: .Blood Blood-Peripheral  Final Report (12-12-23 @ 18:00):    No growth at 5 days     (collected 12-07-23 @ 10:45)  Source: .Blood Blood-Peripheral  Final Report (12-12-23 @ 18:00):    No growth at 5 days     (collected 12-07-23 @ 10:30)  Source: .Blood Blood-Peripheral  Final Report (12-12-23 @ 18:00):    No growth at 5 days              Physical Examination:    General: No acute distress.      HEENT: Pupils equal, reactive to light.  Symmetric.    PULM: Clear to auscultation bilaterally, no significant sputum production    CVS: S1, S2    ABD: Soft, nondistended, nontender, normoactive bowel sounds, no masses    EXT: No edema, nontender    SKIN: Warm and well perfused, no rashes noted.    NEURO: Alert, oriented, interactive, nonfocal    RADIOLOGY REVIEWED  CXR:    CT chest:    TTE:   PULMONARY CONSULT    HPI: 89 y/o M with PMH of HTN, HLD, ?COPD (was discharged from rehab with o2 2LNC qHS and PRN), CAD (no stents), s/p bioprosthetic AVR, CVA in 10/2022, s/p R total hip replacement ~30 years ago, afib/aflutter, hypothyroid, CKD, bladder cancer, and cholangiocarcinoma (s/p treatment, stable) presenting from home with worsening R hip pain. Patient was found to have UTI in 9/2023 and was treated for 1 month. In 10/2023, patient was found to be bacteremic with septic joint on R hip, was treated with 6 week course of IV antibiotics without surgical debridement or operation given age and other comorbidities. Patient was discharged from [place] to [facility], was able to work with PT appropriately. Concern for hip infection, no plan for surgery given comorbidities. Called to consult for mild cough and o2 requirements. Pt was discharged from rehab with o2 PRN. Endorses SOB at times. States nebulizers help bring up sputum. Denies CP, pleuritic CP.             PAST MEDICAL & SURGICAL HISTORY:  Atrial fibrillation  Atrial flutter  CAD (coronary artery disease)  S/P aortic valve replacement with bioprosthetic valve  History of COPD  CVA (cerebrovascular accident)  Hypothyroid  Bladder cancer  Cholangiocarcinoma  S/P hip replacement, right      Allergies  doxycycline (Unknown)  vancomycin (Unknown)  penicillins (Other)        FAMILY HISTORY: non contributory     Social history: former smoker - quit 30 years ago     Review of Systems:  CONSTITUTIONAL: No fever, chills, or fatigue  EYES: No eye pain, visual disturbances, or discharge  ENMT:  No difficulty hearing, tinnitus, vertigo; No sinus or throat pain  NECK: No pain or stiffness  RESPIRATORY: Per above  CARDIOVASCULAR: No chest pain, palpitations, dizziness, or leg swelling  GASTROINTESTINAL: No abdominal or epigastric pain. No nausea, vomiting, or hematemesis; No diarrhea or constipation. No melena or hematochezia.  GENITOURINARY: No dysuria, frequency, hematuria, or incontinence  NEUROLOGICAL: No headaches, memory loss, loss of strength, numbness, or tremors  SKIN: No itching, burning, rashes, or lesions   MUSCULOSKELETAL: Per above   PSYCHIATRIC: No depression, anxiety, mood swings, or difficulty sleeping      Medications:  MEDICATIONS  (STANDING):  albuterol/ipratropium for Nebulization 3 milliLiter(s) Nebulizer every 6 hours  apixaban 2.5 milliGRAM(s) Oral two times a day  atorvastatin 40 milliGRAM(s) Oral at bedtime  finasteride 5 milliGRAM(s) Oral daily  furosemide   Injectable 40 milliGRAM(s) IV Push daily  levothyroxine 75 MICROGram(s) Oral daily  metoprolol succinate  milliGRAM(s) Oral two times a day  minocycline 100 milliGRAM(s) Oral every 12 hours  potassium chloride    Tablet ER 10 milliEquivalent(s) Oral daily  senna 2 Tablet(s) Oral at bedtime  tamsulosin 0.4 milliGRAM(s) Oral at bedtime  tiotropium 2.5 MICROgram(s) Inhaler 2 Puff(s) Inhalation daily    MEDICATIONS  (PRN):  sodium chloride 0.65% Nasal 1 Spray(s) Both Nostrils four times a day PRN Nasal Congestion  traMADol 25 milliGRAM(s) Oral every 6 hours PRN Moderate Pain (4 - 6)  traMADol 50 milliGRAM(s) Oral every 6 hours PRN Severe Pain (7 - 10)            Vital Signs Last 24 Hrs  T(C): 36.7 (15 Dec 2023 09:20), Max: 36.7 (15 Dec 2023 04:10)  T(F): 98.1 (15 Dec 2023 09:20), Max: 98.1 (15 Dec 2023 04:10)  HR: 71 (15 Dec 2023 09:20) (71 - 76)  BP: 98/63 (15 Dec 2023 09:20) (98/63 - 104/63)  BP(mean): --  RR: 18 (15 Dec 2023 09:20) (18 - 18)  SpO2: 94% (15 Dec 2023 09:20) (94% - 97%)    Parameters below as of 15 Dec 2023 09:20  Patient On (Oxygen Delivery Method): nasal cannula  O2 Flow (L/min): 2              12-14 @ 07:01  -  12-15 @ 07:00  --------------------------------------------------------  IN: 780 mL / OUT: 1570 mL / NET: -790 mL          LABS:                        8.9    8.62  )-----------( 176      ( 15 Dec 2023 07:06 )             28.5     12-15    138  |  98  |  44<H>  ----------------------------<  100<H>  4.4   |  30  |  1.45<H>    Ca    9.0      15 Dec 2023 07:06              Urinalysis Basic - ( 15 Dec 2023 07:06 )    Color: x / Appearance: x / SG: x / pH: x  Gluc: 100 mg/dL / Ketone: x  / Bili: x / Urobili: x   Blood: x / Protein: x / Nitrite: x   Leuk Esterase: x / RBC: x / WBC x   Sq Epi: x / Non Sq Epi: x / Bacteria: x                    CULTURES:     (collected 12-07-23 @ 11:27)  Source: .Blood Blood-Peripheral  Final Report (12-12-23 @ 18:00):    No growth at 5 days     (collected 12-07-23 @ 10:45)  Source: .Blood Blood-Peripheral  Final Report (12-12-23 @ 18:00):    No growth at 5 days     (collected 12-07-23 @ 10:30)  Source: .Blood Blood-Peripheral  Final Report (12-12-23 @ 18:00):    No growth at 5 days              Physical Examination:    General: No acute distress.      HEENT: Pupils equal, reactive to light.  Symmetric.    PULM: decreased BS    CVS: S1, S2    ABD: Soft, nondistended, nontender, normoactive bowel sounds, no masses    EXT: No edema, nontender    SKIN: Warm and well perfused, no rashes noted.    NEURO: Alert, oriented, interactive, nonfocal    RADIOLOGY REVIEWED  CXR: mild congestion    PULMONARY CONSULT    HPI: 89 y/o M with PMH of HTN, HLD, ?COPD (was discharged from rehab with o2 2LNC qHS and PRN), CAD (no stents), s/p bioprosthetic AVR, CVA in 10/2022, s/p R total hip replacement ~30 years ago, afib/aflutter, hypothyroid, CKD, bladder cancer, and cholangiocarcinoma (s/p treatment, stable) presenting from home with worsening R hip pain. Patient was found to have UTI in 9/2023 and was treated for 1 month. In 10/2023, patient was found to be bacteremic with septic joint on R hip, was treated with 6 week course of IV antibiotics without surgical debridement or operation given age and other comorbidities. Patient was discharged from [place] to [facility], was able to work with PT appropriately. Concern for hip infection, no plan for surgery given comorbidities. Called to consult for mild cough and o2 requirements. Pt was discharged from rehab with o2 PRN. Endorses SOB at times. States nebulizers help bring up sputum. Denies CP, pleuritic CP.             PAST MEDICAL & SURGICAL HISTORY:  Atrial fibrillation  Atrial flutter  CAD (coronary artery disease)  S/P aortic valve replacement with bioprosthetic valve  History of COPD  CVA (cerebrovascular accident)  Hypothyroid  Bladder cancer  Cholangiocarcinoma  S/P hip replacement, right      Allergies  doxycycline (Unknown)  vancomycin (Unknown)  penicillins (Other)        FAMILY HISTORY: non contributory     Social history: former smoker - quit 30 years ago     Review of Systems:  CONSTITUTIONAL: No fever, chills, or fatigue  EYES: No eye pain, visual disturbances, or discharge  ENMT:  No difficulty hearing, tinnitus, vertigo; No sinus or throat pain  NECK: No pain or stiffness  RESPIRATORY: Per above  CARDIOVASCULAR: No chest pain, palpitations, dizziness, or leg swelling  GASTROINTESTINAL: No abdominal or epigastric pain. No nausea, vomiting, or hematemesis; No diarrhea or constipation. No melena or hematochezia.  GENITOURINARY: No dysuria, frequency, hematuria, or incontinence  NEUROLOGICAL: No headaches, memory loss, loss of strength, numbness, or tremors  SKIN: No itching, burning, rashes, or lesions   MUSCULOSKELETAL: Per above   PSYCHIATRIC: No depression, anxiety, mood swings, or difficulty sleeping      Medications:  MEDICATIONS  (STANDING):  albuterol/ipratropium for Nebulization 3 milliLiter(s) Nebulizer every 6 hours  apixaban 2.5 milliGRAM(s) Oral two times a day  atorvastatin 40 milliGRAM(s) Oral at bedtime  finasteride 5 milliGRAM(s) Oral daily  furosemide   Injectable 40 milliGRAM(s) IV Push daily  levothyroxine 75 MICROGram(s) Oral daily  metoprolol succinate  milliGRAM(s) Oral two times a day  minocycline 100 milliGRAM(s) Oral every 12 hours  potassium chloride    Tablet ER 10 milliEquivalent(s) Oral daily  senna 2 Tablet(s) Oral at bedtime  tamsulosin 0.4 milliGRAM(s) Oral at bedtime  tiotropium 2.5 MICROgram(s) Inhaler 2 Puff(s) Inhalation daily    MEDICATIONS  (PRN):  sodium chloride 0.65% Nasal 1 Spray(s) Both Nostrils four times a day PRN Nasal Congestion  traMADol 25 milliGRAM(s) Oral every 6 hours PRN Moderate Pain (4 - 6)  traMADol 50 milliGRAM(s) Oral every 6 hours PRN Severe Pain (7 - 10)            Vital Signs Last 24 Hrs  T(C): 36.7 (15 Dec 2023 09:20), Max: 36.7 (15 Dec 2023 04:10)  T(F): 98.1 (15 Dec 2023 09:20), Max: 98.1 (15 Dec 2023 04:10)  HR: 71 (15 Dec 2023 09:20) (71 - 76)  BP: 98/63 (15 Dec 2023 09:20) (98/63 - 104/63)  BP(mean): --  RR: 18 (15 Dec 2023 09:20) (18 - 18)  SpO2: 94% (15 Dec 2023 09:20) (94% - 97%)    Parameters below as of 15 Dec 2023 09:20  Patient On (Oxygen Delivery Method): nasal cannula  O2 Flow (L/min): 2              12-14 @ 07:01  -  12-15 @ 07:00  --------------------------------------------------------  IN: 780 mL / OUT: 1570 mL / NET: -790 mL          LABS:                        8.9    8.62  )-----------( 176      ( 15 Dec 2023 07:06 )             28.5     12-15    138  |  98  |  44<H>  ----------------------------<  100<H>  4.4   |  30  |  1.45<H>    Ca    9.0      15 Dec 2023 07:06              Urinalysis Basic - ( 15 Dec 2023 07:06 )    Color: x / Appearance: x / SG: x / pH: x  Gluc: 100 mg/dL / Ketone: x  / Bili: x / Urobili: x   Blood: x / Protein: x / Nitrite: x   Leuk Esterase: x / RBC: x / WBC x   Sq Epi: x / Non Sq Epi: x / Bacteria: x                    CULTURES:     (collected 12-07-23 @ 11:27)  Source: .Blood Blood-Peripheral  Final Report (12-12-23 @ 18:00):    No growth at 5 days     (collected 12-07-23 @ 10:45)  Source: .Blood Blood-Peripheral  Final Report (12-12-23 @ 18:00):    No growth at 5 days     (collected 12-07-23 @ 10:30)  Source: .Blood Blood-Peripheral  Final Report (12-12-23 @ 18:00):    No growth at 5 days              Physical Examination:    General: No acute distress.      HEENT: Pupils equal, reactive to light.  Symmetric.    PULM: decreased BS    CVS: S1, S2    ABD: Soft, nondistended, nontender, normoactive bowel sounds, no masses    EXT: No edema, nontender    SKIN: Warm and well perfused, no rashes noted.    NEURO: Alert, oriented, interactive, nonfocal    RADIOLOGY REVIEWED  CXR: mild congestion

## 2023-12-15 NOTE — PROGRESS NOTE ADULT - ASSESSMENT
Assessment:  89M complex medical history. Has a R DARRYL from 30 years ago s/p I&D and PE exchagne 10 years ago at OSH. 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 Just stopped abx and pain worsened. Cannot bear weight.     Plan:  - WBAT RLE  - Pain control  - Trial nonoperative management with antibiotics  - COPD management  - DVT PPX per primary  - Outpatient follow up      For all questions related to patient care, please reach out to the on-call team via the pager.     Talia Marcos, PGY 2  Orthopaedic Surgery  [facility] [phone]  [facility] [phone]  [facility] [phone]   Assessment:  89M complex medical history. Has a R DARRYL from 30 years ago s/p I&D and PE exchagne 10 years ago at OSH. 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 Just stopped abx and pain worsened. Cannot bear weight.     Plan:  - WBAT RLE  - Pain control  - Trial nonoperative management with antibiotics  - COPD management  - DVT PPX per primary  - Outpatient follow up      For all questions related to patient care, please reach out to the on-call team via the pager.     Talia Marcos, PGY 2  Orthopaedic Surgery  [facility] [id number]  [facility] [phone]  [facility] [phone]

## 2023-12-15 NOTE — PROGRESS NOTE ADULT - SUBJECTIVE AND OBJECTIVE BOX
DATE OF SERVICE: 12-15-23 @ 11:53    Patient is a 89y old  Male who presents with a chief complaint of fever (14 Dec 2023 08:58)      SUBJECTIVE / OVERNIGHT EVENTS:  c/o productive cough    MEDICATIONS  (STANDING):  albuterol/ipratropium for Nebulization 3 milliLiter(s) Nebulizer every 6 hours  apixaban 2.5 milliGRAM(s) Oral two times a day  atorvastatin 40 milliGRAM(s) Oral at bedtime  finasteride 5 milliGRAM(s) Oral daily  furosemide   Injectable 40 milliGRAM(s) IV Push daily  levothyroxine 75 MICROGram(s) Oral daily  metoprolol succinate  milliGRAM(s) Oral two times a day  minocycline 100 milliGRAM(s) Oral every 12 hours  potassium chloride    Tablet ER 10 milliEquivalent(s) Oral daily  senna 2 Tablet(s) Oral at bedtime  tamsulosin 0.4 milliGRAM(s) Oral at bedtime  tiotropium 2.5 MICROgram(s) Inhaler 2 Puff(s) Inhalation daily    MEDICATIONS  (PRN):  sodium chloride 0.65% Nasal 1 Spray(s) Both Nostrils four times a day PRN Nasal Congestion  traMADol 25 milliGRAM(s) Oral every 6 hours PRN Moderate Pain (4 - 6)  traMADol 50 milliGRAM(s) Oral every 6 hours PRN Severe Pain (7 - 10)      Vital Signs Last 24 Hrs  T(C): 36.7 (15 Dec 2023 09:20), Max: 36.7 (15 Dec 2023 04:10)  T(F): 98.1 (15 Dec 2023 09:20), Max: 98.1 (15 Dec 2023 04:10)  HR: 71 (15 Dec 2023 09:20) (71 - 76)  BP: 98/63 (15 Dec 2023 09:20) (98/63 - 104/63)  BP(mean): --  RR: 18 (15 Dec 2023 09:20) (18 - 18)  SpO2: 94% (15 Dec 2023 09:20) (94% - 97%)    Parameters below as of 15 Dec 2023 09:20  Patient On (Oxygen Delivery Method): nasal cannula  O2 Flow (L/min): 2    CAPILLARY BLOOD GLUCOSE        I&O's Summary    14 Dec 2023 07:01  -  15 Dec 2023 07:00  --------------------------------------------------------  IN: 780 mL / OUT: 1570 mL / NET: -790 mL    15 Dec 2023 07:01  -  15 Dec 2023 11:53  --------------------------------------------------------  IN: 480 mL / OUT: 401 mL / NET: 79 mL        PHYSICAL EXAM:  GENERAL: NAD, well-developed  HEAD:  Atraumatic, Normocephalic  EYES: EOMI, PERRLA, conjunctiva and sclera clear  NECK: Supple, No JVD  CHEST/LUNG: mild aravind  wheeze  HEART: Regular rate and rhythm; No murmurs, rubs, or gallops  ABDOMEN: Soft, Nontender, Nondistended; Bowel sounds present  EXTREMITIES:  2+ Peripheral Pulses, No clubbing, cyanosis, or edema  PSYCH: AAOx3  NEUROLOGY: non-focal  SKIN: No rashes or lesions    LABS:                        8.9    8.62  )-----------( 176      ( 15 Dec 2023 07:06 )             28.5     12-15    138  |  98  |  44<H>  ----------------------------<  100<H>  4.4   |  30  |  1.45<H>    Ca    9.0      15 Dec 2023 07:06            Urinalysis Basic - ( 15 Dec 2023 07:06 )    Color: x / Appearance: x / SG: x / pH: x  Gluc: 100 mg/dL / Ketone: x  / Bili: x / Urobili: x   Blood: x / Protein: x / Nitrite: x   < from: Xray Chest 1 View- PORTABLE-Urgent (Xray Chest 1 View- PORTABLE-Urgent .) (12.13.23 @ 19:00) >  FINDINGS:    Cardiac/mediastinum/hilum: Aortic valve replacement. Left cardiac border   is partially obscured.    Lung parenchyma/Pleura: Small bilateral pleural effusions greater on the  left. Bibasilar atelectasis. No pneumothorax. Normal pulmonary vasculature    Skeleton/soft tissues: No acute pathology. Stable midline sternotomy   wires.      IMPRESSION:    Small bilateral pleural effusions greater on the left. Bibasilar   atelectasis. Findings improved bilaterally      < end of copied text >  Leuk Esterase: x / RBC: x / WBC x   Sq Epi: x / Non Sq Epi: x / Bacteria: x        RADIOLOGY & ADDITIONAL TESTS:    Imaging Personally Reviewed:    Consultant(s) Notes Reviewed:      Care Discussed with Consultants/Other Providers:

## 2023-12-16 DIAGNOSIS — U07.1 COVID-19: ICD-10-CM

## 2023-12-16 LAB
ALBUMIN SERPL ELPH-MCNC: 2.9 G/DL — LOW (ref 3.3–5)
ALBUMIN SERPL ELPH-MCNC: 2.9 G/DL — LOW (ref 3.3–5)
ALP SERPL-CCNC: 226 U/L — HIGH (ref 40–120)
ALP SERPL-CCNC: 226 U/L — HIGH (ref 40–120)
ALT FLD-CCNC: 41 U/L — SIGNIFICANT CHANGE UP (ref 10–45)
ALT FLD-CCNC: 41 U/L — SIGNIFICANT CHANGE UP (ref 10–45)
ANION GAP SERPL CALC-SCNC: 9 MMOL/L — SIGNIFICANT CHANGE UP (ref 5–17)
ANION GAP SERPL CALC-SCNC: 9 MMOL/L — SIGNIFICANT CHANGE UP (ref 5–17)
AST SERPL-CCNC: 40 U/L — SIGNIFICANT CHANGE UP (ref 10–40)
AST SERPL-CCNC: 40 U/L — SIGNIFICANT CHANGE UP (ref 10–40)
BILIRUB SERPL-MCNC: 0.7 MG/DL — SIGNIFICANT CHANGE UP (ref 0.2–1.2)
BILIRUB SERPL-MCNC: 0.7 MG/DL — SIGNIFICANT CHANGE UP (ref 0.2–1.2)
BUN SERPL-MCNC: 44 MG/DL — HIGH (ref 7–23)
BUN SERPL-MCNC: 44 MG/DL — HIGH (ref 7–23)
CALCIUM SERPL-MCNC: 9.2 MG/DL — SIGNIFICANT CHANGE UP (ref 8.4–10.5)
CALCIUM SERPL-MCNC: 9.2 MG/DL — SIGNIFICANT CHANGE UP (ref 8.4–10.5)
CHLORIDE SERPL-SCNC: 96 MMOL/L — SIGNIFICANT CHANGE UP (ref 96–108)
CHLORIDE SERPL-SCNC: 96 MMOL/L — SIGNIFICANT CHANGE UP (ref 96–108)
CO2 SERPL-SCNC: 30 MMOL/L — SIGNIFICANT CHANGE UP (ref 22–31)
CO2 SERPL-SCNC: 30 MMOL/L — SIGNIFICANT CHANGE UP (ref 22–31)
CREAT SERPL-MCNC: 1.5 MG/DL — HIGH (ref 0.5–1.3)
CREAT SERPL-MCNC: 1.5 MG/DL — HIGH (ref 0.5–1.3)
D DIMER BLD IA.RAPID-MCNC: 1966 NG/ML DDU — HIGH
D DIMER BLD IA.RAPID-MCNC: 1966 NG/ML DDU — HIGH
EGFR: 43 ML/MIN/1.73M2 — LOW
EGFR: 43 ML/MIN/1.73M2 — LOW
GLUCOSE SERPL-MCNC: 98 MG/DL — SIGNIFICANT CHANGE UP (ref 70–99)
GLUCOSE SERPL-MCNC: 98 MG/DL — SIGNIFICANT CHANGE UP (ref 70–99)
HCT VFR BLD CALC: 29.5 % — LOW (ref 39–50)
HCT VFR BLD CALC: 29.5 % — LOW (ref 39–50)
HGB BLD-MCNC: 9 G/DL — LOW (ref 13–17)
HGB BLD-MCNC: 9 G/DL — LOW (ref 13–17)
MCHC RBC-ENTMCNC: 30.5 GM/DL — LOW (ref 32–36)
MCHC RBC-ENTMCNC: 30.5 GM/DL — LOW (ref 32–36)
MCHC RBC-ENTMCNC: 30.6 PG — SIGNIFICANT CHANGE UP (ref 27–34)
MCHC RBC-ENTMCNC: 30.6 PG — SIGNIFICANT CHANGE UP (ref 27–34)
MCV RBC AUTO: 100.3 FL — HIGH (ref 80–100)
MCV RBC AUTO: 100.3 FL — HIGH (ref 80–100)
NRBC # BLD: 0 /100 WBCS — SIGNIFICANT CHANGE UP (ref 0–0)
NRBC # BLD: 0 /100 WBCS — SIGNIFICANT CHANGE UP (ref 0–0)
PLATELET # BLD AUTO: 159 K/UL — SIGNIFICANT CHANGE UP (ref 150–400)
PLATELET # BLD AUTO: 159 K/UL — SIGNIFICANT CHANGE UP (ref 150–400)
POTASSIUM SERPL-MCNC: 4 MMOL/L — SIGNIFICANT CHANGE UP (ref 3.5–5.3)
POTASSIUM SERPL-MCNC: 4 MMOL/L — SIGNIFICANT CHANGE UP (ref 3.5–5.3)
POTASSIUM SERPL-SCNC: 4 MMOL/L — SIGNIFICANT CHANGE UP (ref 3.5–5.3)
POTASSIUM SERPL-SCNC: 4 MMOL/L — SIGNIFICANT CHANGE UP (ref 3.5–5.3)
PROT SERPL-MCNC: 6.6 G/DL — SIGNIFICANT CHANGE UP (ref 6–8.3)
PROT SERPL-MCNC: 6.6 G/DL — SIGNIFICANT CHANGE UP (ref 6–8.3)
RAPID RVP RESULT: DETECTED
RAPID RVP RESULT: DETECTED
RBC # BLD: 2.94 M/UL — LOW (ref 4.2–5.8)
RBC # BLD: 2.94 M/UL — LOW (ref 4.2–5.8)
RBC # FLD: 16.6 % — HIGH (ref 10.3–14.5)
RBC # FLD: 16.6 % — HIGH (ref 10.3–14.5)
SARS-COV-2 RNA SPEC QL NAA+PROBE: DETECTED
SARS-COV-2 RNA SPEC QL NAA+PROBE: DETECTED
SODIUM SERPL-SCNC: 135 MMOL/L — SIGNIFICANT CHANGE UP (ref 135–145)
SODIUM SERPL-SCNC: 135 MMOL/L — SIGNIFICANT CHANGE UP (ref 135–145)
WBC # BLD: 8.56 K/UL — SIGNIFICANT CHANGE UP (ref 3.8–10.5)
WBC # BLD: 8.56 K/UL — SIGNIFICANT CHANGE UP (ref 3.8–10.5)
WBC # FLD AUTO: 8.56 K/UL — SIGNIFICANT CHANGE UP (ref 3.8–10.5)
WBC # FLD AUTO: 8.56 K/UL — SIGNIFICANT CHANGE UP (ref 3.8–10.5)

## 2023-12-16 RX ADMIN — Medication 10 MILLIEQUIVALENT(S): at 12:37

## 2023-12-16 RX ADMIN — Medication 3 MILLILITER(S): at 05:11

## 2023-12-16 RX ADMIN — APIXABAN 2.5 MILLIGRAM(S): 2.5 TABLET, FILM COATED ORAL at 17:26

## 2023-12-16 RX ADMIN — TIOTROPIUM BROMIDE 2 PUFF(S): 18 CAPSULE ORAL; RESPIRATORY (INHALATION) at 12:37

## 2023-12-16 RX ADMIN — Medication 100 MILLIGRAM(S): at 17:26

## 2023-12-16 RX ADMIN — SENNA PLUS 2 TABLET(S): 8.6 TABLET ORAL at 21:12

## 2023-12-16 RX ADMIN — FINASTERIDE 5 MILLIGRAM(S): 5 TABLET, FILM COATED ORAL at 12:38

## 2023-12-16 RX ADMIN — APIXABAN 2.5 MILLIGRAM(S): 2.5 TABLET, FILM COATED ORAL at 05:12

## 2023-12-16 RX ADMIN — Medication 3 MILLILITER(S): at 17:25

## 2023-12-16 RX ADMIN — Medication 75 MICROGRAM(S): at 05:12

## 2023-12-16 RX ADMIN — MINOCYCLINE HYDROCHLORIDE 100 MILLIGRAM(S): 45 TABLET, EXTENDED RELEASE ORAL at 05:12

## 2023-12-16 RX ADMIN — ATORVASTATIN CALCIUM 40 MILLIGRAM(S): 80 TABLET, FILM COATED ORAL at 21:12

## 2023-12-16 RX ADMIN — Medication 40 MILLIGRAM(S): at 05:11

## 2023-12-16 RX ADMIN — Medication 100 MILLIGRAM(S): at 05:12

## 2023-12-16 RX ADMIN — MINOCYCLINE HYDROCHLORIDE 100 MILLIGRAM(S): 45 TABLET, EXTENDED RELEASE ORAL at 17:26

## 2023-12-16 RX ADMIN — Medication 3 MILLILITER(S): at 12:37

## 2023-12-16 RX ADMIN — TAMSULOSIN HYDROCHLORIDE 0.4 MILLIGRAM(S): 0.4 CAPSULE ORAL at 21:12

## 2023-12-16 NOTE — PROGRESS NOTE ADULT - SUBJECTIVE AND OBJECTIVE BOX
Follow-up Pulm Progress Note    No new respiratory events overnight.  Denies SOB/CP.   O2 sats 98% on 1LNC, placed on room air    Medications:  MEDICATIONS  (STANDING):  albuterol/ipratropium for Nebulization 3 milliLiter(s) Nebulizer every 6 hours  apixaban 2.5 milliGRAM(s) Oral two times a day  atorvastatin 40 milliGRAM(s) Oral at bedtime  finasteride 5 milliGRAM(s) Oral daily  furosemide   Injectable 40 milliGRAM(s) IV Push daily  levothyroxine 75 MICROGram(s) Oral daily  metoprolol succinate  milliGRAM(s) Oral two times a day  minocycline 100 milliGRAM(s) Oral every 12 hours  potassium chloride    Tablet ER 10 milliEquivalent(s) Oral daily  senna 2 Tablet(s) Oral at bedtime  tamsulosin 0.4 milliGRAM(s) Oral at bedtime  tiotropium 2.5 MICROgram(s) Inhaler 2 Puff(s) Inhalation daily    MEDICATIONS  (PRN):  sodium chloride 0.65% Nasal 1 Spray(s) Both Nostrils four times a day PRN Nasal Congestion  traMADol 25 milliGRAM(s) Oral every 6 hours PRN Moderate Pain (4 - 6)  traMADol 50 milliGRAM(s) Oral every 6 hours PRN Severe Pain (7 - 10)          Vital Signs Last 24 Hrs  T(C): 36.4 (16 Dec 2023 09:18), Max: 36.9 (15 Dec 2023 19:08)  T(F): 97.5 (16 Dec 2023 09:18), Max: 98.4 (15 Dec 2023 19:08)  HR: 86 (16 Dec 2023 09:18) (75 - 86)  BP: 129/62 (16 Dec 2023 09:18) (103/54 - 129/62)  BP(mean): --  RR: 20 (16 Dec 2023 09:18) (18 - 20)  SpO2: 98% (16 Dec 2023 09:18) (96% - 98%)    Parameters below as of 16 Dec 2023 09:18  Patient On (Oxygen Delivery Method): nasal cannula  O2 Flow (L/min): 1            12-15 @ 07:01  -  12-16 @ 07:00  --------------------------------------------------------  IN: 960 mL / OUT: 802 mL / NET: 158 mL          LABS:                        9.0    8.56  )-----------( 159      ( 16 Dec 2023 07:24 )             29.5     12-16    135  |  96  |  44<H>  ----------------------------<  98  4.0   |  30  |  1.50<H>    Ca    9.2      16 Dec 2023 07:22    TPro  6.6  /  Alb  2.9<L>  /  TBili  0.7  /  DBili  x   /  AST  40  /  ALT  41  /  AlkPhos  226<H>  12-16          CAPILLARY BLOOD GLUCOSE          Urinalysis Basic - ( 16 Dec 2023 07:22 )    Color: x / Appearance: x / SG: x / pH: x  Gluc: 98 mg/dL / Ketone: x  / Bili: x / Urobili: x   Blood: x / Protein: x / Nitrite: x   Leuk Esterase: x / RBC: x / WBC x   Sq Epi: x / Non Sq Epi: x / Bacteria: x      CULTURES:   Culture - Blood (collected 12-07-23 @ 11:27)  Source: .Blood Blood-Peripheral  Final Report (12-12-23 @ 18:00):    No growth at 5 days    Culture - Blood (collected 12-07-23 @ 10:45)  Source: .Blood Blood-Peripheral  Final Report (12-12-23 @ 18:00):    No growth at 5 days    Culture - Blood (collected 12-07-23 @ 10:30)  Source: .Blood Blood-Peripheral  Final Report (12-12-23 @ 18:00):    No growth at 5 days    Physical Examination:  PULM: Decreased BS; no wheeze   CVS: S1, S2 heard    RADIOLOGY REVIEWED  CXR: mild congestion

## 2023-12-16 NOTE — PROGRESS NOTE ADULT - SUBJECTIVE AND OBJECTIVE BOX
Subjective: Patient seen and examined. No new events except as noted.     REVIEW OF SYSTEMS:    CONSTITUTIONAL: + weakness, fevers or chills  EYES/ENT: No visual changes;  No vertigo or throat pain   NECK: No pain or stiffness  RESPIRATORY: No cough, wheezing, hemoptysis; No shortness of breath  CARDIOVASCULAR: No chest pain or palpitations  GASTROINTESTINAL: No abdominal or epigastric pain. No nausea, vomiting, or hematemesis; No diarrhea or constipation. No melena or hematochezia.  GENITOURINARY: No dysuria, frequency or hematuria  NEUROLOGICAL: No numbness or weakness  SKIN: No itching, burning, rashes, or lesions   All other review of systems is negative unless indicated above.    MEDICATIONS:  MEDICATIONS  (STANDING):  albuterol/ipratropium for Nebulization 3 milliLiter(s) Nebulizer every 6 hours  apixaban 2.5 milliGRAM(s) Oral two times a day  atorvastatin 40 milliGRAM(s) Oral at bedtime  finasteride 5 milliGRAM(s) Oral daily  furosemide   Injectable 40 milliGRAM(s) IV Push daily  levothyroxine 75 MICROGram(s) Oral daily  metoprolol succinate  milliGRAM(s) Oral two times a day  minocycline 100 milliGRAM(s) Oral every 12 hours  potassium chloride    Tablet ER 10 milliEquivalent(s) Oral daily  senna 2 Tablet(s) Oral at bedtime  tamsulosin 0.4 milliGRAM(s) Oral at bedtime  tiotropium 2.5 MICROgram(s) Inhaler 2 Puff(s) Inhalation daily      PHYSICAL EXAM:  T(C): 36.8 (12-16-23 @ 19:24), Max: 36.8 (12-16-23 @ 19:24)  HR: 77 (12-16-23 @ 19:24) (75 - 86)  BP: 108/64 (12-16-23 @ 19:24) (104/60 - 129/62)  RR: 18 (12-16-23 @ 19:24) (18 - 20)  SpO2: 92% (12-16-23 @ 19:24) (92% - 100%)  Wt(kg): --  I&O's Summary    15 Dec 2023 07:01  -  16 Dec 2023 07:00  --------------------------------------------------------  IN: 960 mL / OUT: 802 mL / NET: 158 mL    16 Dec 2023 07:01  -  16 Dec 2023 19:42  --------------------------------------------------------  IN: 840 mL / OUT: 951 mL / NET: -111 mL            Appearance: NAD  HEENT:   Dry  oral mucosa, PERRL, EOMI	  Lymphatic: No lymphadenopathy  Cardiovascular: Normal S1 S2, No JVD, + systolic murmur  Respiratory: Lungs clear to auscultation	  Psychiatry: A & O x 3, Mood & affect appropriate  Gastrointestinal:  Soft, Non-tender, + BS	  Skin: No rashes, No ecchymoses, No cyanosis	  Neurologic: Non-focal  Extremities: Normal range of motion, No clubbing, cyanosis + edema  Vascular: Peripheral pulses palpable 2+ bilaterally        LABS:    CARDIAC MARKERS:                                9.0    8.56  )-----------( 159      ( 16 Dec 2023 07:24 )             29.5     12-16    135  |  96  |  44<H>  ----------------------------<  98  4.0   |  30  |  1.50<H>    Ca    9.2      16 Dec 2023 07:22    TPro  6.6  /  Alb  2.9<L>  /  TBili  0.7  /  DBili  x   /  AST  40  /  ALT  41  /  AlkPhos  226<H>  12-16    proBNP:   Lipid Profile:   HgA1c:   TSH:             TELEMETRY: 	    ECG:  	  RADIOLOGY:   DIAGNOSTIC TESTING:  [ ] Echocardiogram:  [ ]  Catheterization:  [ ] Stress Test:    OTHER:

## 2023-12-16 NOTE — PROGRESS NOTE ADULT - ASSESSMENT
89M with history of HTN, HLD, COPD (on intermittent 2L NC), CAD (no stents), s/p bioprosthetic AVR, CVA in 10/2022, s/p R total hip replacement ~30 years ago, afib/aflutter, hypothyroid, CKD, bladder cancer, and cholangiocarcinoma (s/p treatment, stable) presenting from home with worsening R hip pain. Patient was found to have UTI in 9/2023 and was treated for 1 month. In 10/2023, patient was found to be bacteremic with septic joint on R hip, was treated with 6 week course of IV antibiotics without surgical debridement or operation given age and other comorbidities. Patient was discharged from [place] to [facility], was able to work with PT appropriately. He finished last dose of IV antibiotics and was discharged home on Tuesday. He was seen by Dr. Darby and was being planned for IV antibiotics but has not started yet. He started having significant pain on the R hip again, now unable to bear weight. No fever or chills. 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 Patient was found to have UTI in 9/2023 and was treated for 1 month. In 10/2023, patient was found to be bacteremic with septic joint on R hip, was treated with 6 week course of IV antibiotics without surgical debridement or operation given age and other comorbidities. Patient was discharged from [place] to [facility], was able to work with PT appropriately. He finished last dose of IV antibiotics and was discharged home on Tuesday. He was seen by Dr. Darby and was being planned for IV antibiotics but has not started yet. He started having significant pain on the R hip again, now unable to bear weight. No fever or chills. 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

## 2023-12-16 NOTE — PROGRESS NOTE ADULT - SUBJECTIVE AND OBJECTIVE BOX
DATE OF SERVICE: 12-16-23 @ 08:00    Patient is a 89y old  Male who presents with a chief complaint of fever (14 Dec 2023 08:58)      SUBJECTIVE / OVERNIGHT EVENTS:  No chest pain. No shortness of breath. No complaints. No events overnight.     MEDICATIONS  (STANDING):  albuterol/ipratropium for Nebulization 3 milliLiter(s) Nebulizer every 6 hours  apixaban 2.5 milliGRAM(s) Oral two times a day  atorvastatin 40 milliGRAM(s) Oral at bedtime  finasteride 5 milliGRAM(s) Oral daily  furosemide   Injectable 40 milliGRAM(s) IV Push daily  levothyroxine 75 MICROGram(s) Oral daily  metoprolol succinate  milliGRAM(s) Oral two times a day  minocycline 100 milliGRAM(s) Oral every 12 hours  potassium chloride    Tablet ER 10 milliEquivalent(s) Oral daily  senna 2 Tablet(s) Oral at bedtime  tamsulosin 0.4 milliGRAM(s) Oral at bedtime  tiotropium 2.5 MICROgram(s) Inhaler 2 Puff(s) Inhalation daily    MEDICATIONS  (PRN):  sodium chloride 0.65% Nasal 1 Spray(s) Both Nostrils four times a day PRN Nasal Congestion  traMADol 25 milliGRAM(s) Oral every 6 hours PRN Moderate Pain (4 - 6)  traMADol 50 milliGRAM(s) Oral every 6 hours PRN Severe Pain (7 - 10)      Vital Signs Last 24 Hrs  T(C): 36.7 (16 Dec 2023 04:14), Max: 36.9 (15 Dec 2023 19:08)  T(F): 98.1 (16 Dec 2023 04:14), Max: 98.4 (15 Dec 2023 19:08)  HR: 75 (16 Dec 2023 04:14) (71 - 78)  BP: 104/60 (16 Dec 2023 04:14) (98/63 - 110/60)  BP(mean): --  RR: 19 (16 Dec 2023 04:14) (18 - 19)  SpO2: 97% (16 Dec 2023 04:14) (94% - 98%)    Parameters below as of 16 Dec 2023 04:14  Patient On (Oxygen Delivery Method): nasal cannula  O2 Flow (L/min): 1    CAPILLARY BLOOD GLUCOSE        I&O's Summary    15 Dec 2023 07:01  -  16 Dec 2023 07:00  --------------------------------------------------------  IN: 960 mL / OUT: 802 mL / NET: 158 mL        PHYSICAL EXAM:  GENERAL: NAD, well-developed  HEAD:  Atraumatic, Normocephalic  EYES: EOMI, PERRLA, conjunctiva and sclera clear  NECK: Supple, No JVD  CHEST/LUNG: Clear to auscultation bilaterally; No wheeze  HEART: Regular rate and rhythm; No murmurs, rubs, or gallops  ABDOMEN: Soft, Nontender, Nondistended; Bowel sounds present  EXTREMITIES:  2+ Peripheral Pulses, No clubbing, cyanosis, aravind edema  PSYCH: AAOx3  NEUROLOGY: non-focal  SKIN: No rashes or lesions    LABS:                        8.9    8.62  )-----------( 176      ( 15 Dec 2023 07:06 )             28.5     12-15    138  |  98  |  44<H>  ----------------------------<  100<H>  4.4   |  30  |  1.45<H>    Ca    9.0      15 Dec 2023 07:06            Urinalysis Basic - ( 15 Dec 2023 07:06 )    Color: x / Appearance: x / SG: x / pH: x  Gluc: 100 mg/dL / Ketone: x  / Bili: x / Urobili: x   Blood: x / Protein: x / Nitrite: x   Leuk Esterase: x / RBC: x / WBC x   Sq Epi: x / Non Sq Epi: x / Bacteria: x        RADIOLOGY & ADDITIONAL TESTS:    Imaging Personally Reviewed:    Consultant(s) Notes Reviewed:      Care Discussed with Consultants/Other Providers:

## 2023-12-16 NOTE — PROGRESS NOTE ADULT - PROBLEM SELECTOR PLAN 2
RVP COVID + 12/15  -CXR with mild congestion  -No cough, Denies SOB  -Minimal O2 requirements, placed on room air this AM. Favor monitoring off steroids given infected hip prothesis  -Can consider Remdesivir if aligns with GOC  -Trend inflammatory markers  -AC with Eliquis for afib. RVP COVID + 12/15  -CXR with mild congestion  -No cough, Denies SOB  -Minimal O2 requirements, placed on room air this AM. Favor monitoring off steroids given infected hip prothesis  -Can consider Remdesivir if aligns with GOC  -Consider CT chest non-contrast if aligns with GOC   -Trend inflammatory markers  -AC with Eliquis for afib.

## 2023-12-16 NOTE — PROGRESS NOTE ADULT - PROBLEM SELECTOR PLAN 2
severe prosthetic aortic valve stenosis   appears compensated at present time   given advanced age and decreased mobility, would favor conservative management. Discussed with his son at bedside and he agrees   cont with Lasix as ordered  Change to Lasix 40 PO daily upon discharge

## 2023-12-16 NOTE — PROGRESS NOTE ADULT - ASSESSMENT
89M with history of HTN, HLD, COPD (on intermittent 2L NC), CAD (no stents), s/p bioprosthetic AVR, CVA in 10/2022, s/p R total hip replacement ~30 years ago, afib/aflutter, hypothyroid, CKD, bladder cancer, and cholangiocarcinoma (s/p treatment, stable) presenting from home with worsening R hip pain. Accompanied by granddaughter at bedside. Patient was found to have UTI in 9/2023 and was treated for 1 month. In 10/2023, patient was found to be bacteremic with septic joint on R hip, was treated with 6 week course of IV antibiotics without surgical debridement or operation given age and other comorbidities. Patient was discharged from [place] to [facility], was able to work with PT appropriately. He finished last dose of IV antibiotics and was discharged home on Tuesday. He was seen by Dr. Redding in clinic yesterday and was being planned for IV antibiotics but has not started yet. He started having significant pain on the R hip again since discharge, now unable to bear weight. No fever or chills. No falls or trauma    covid 19 positive  - supportive care  - keep O2 saturation above 92%  - will consider remdesivir    hypoxia  - seen by pulm   -Recently discharged from rehab with o2 PRN  -Mild congestion on CXR. Keep O>I as tolerated.   -O2 lowered to 1LNC  -Check o2 sats on RA and document. Keep sats >88% with o2 PRN. Pt will likely require o2 qHS and PRN.   -?Underlying COPD  -Continue Duoneb q6h while inpatient, change to PRN on discharge. Please assure pt has rx for nebs and nebulizer machine on discharge.    right prosthetic  hip infection  - Blood cultures times 2 NGTD  -  to go home on po suppression with minocycline  - ID consult appreciated  - CT of right hip done  - no plans for surgery or drainage    cough  - c/w duoneb  - pulm called    aravind lower ext edema with dyspnea  - CXR  - doppler  negative  - iv lasix 40 qd  - change to po on discharge    JANKI  - follow up labs today  - if cre high will stop lasix    severe prosthetic aortic valve stenosis  - cardio consult following  - conservative management    anemia  - iron studies c/w iron def  - FOBT  - iv iron  -  s/p transfusion 1 unit prbc    afib , AVR  - c/w metoprolol  - c/w eliquis 2.5 bid    CAD  - c/w plavix    COPD  - c/w inhalers    BPH  - flomax and finasteride    HLD  - c/w lipitor    dvt px  - on eliquis    d/c planning  - home  - d/w son and case management              89M with history of HTN, HLD, COPD (on intermittent 2L NC), CAD (no stents), s/p bioprosthetic AVR, CVA in 10/2022, s/p R total hip replacement ~30 years ago, afib/aflutter, hypothyroid, CKD, bladder cancer, and cholangiocarcinoma (s/p treatment, stable) presenting from home with worsening R hip pain. Accompanied by granddaughter at bedside. Patient was found to have UTI in 9/2023 and was treated for 1 month. In 10/2023, patient was found to be bacteremic with septic joint on R hip, was treated with 6 week course of IV antibiotics without surgical debridement or operation given age and other comorbidities. Patient was discharged from [place] to [facility], was able to work with PT appropriately. He finished last dose of IV antibiotics and was discharged home on Tuesday. He was seen by Dr. Redding in clinic yesterday and was being planned for IV antibiotics but has not started yet. He started having significant pain on the R hip again since discharge, now unable to bear weight. No fever or chills. No falls or trauma    covid 19 positive  - supportive care  - keep O2 saturation above 92%  - will consider remdesivir    hypoxia  - seen by pulm   -Recently discharged from rehab with o2 PRN  -Mild congestion on CXR. Keep O>I as tolerated.   -O2 lowered to 1LNC  -Check o2 sats on RA and document. Keep sats >88% with o2 PRN. Pt will likely require o2 qHS and PRN.   -?Underlying COPD  -Continue Duoneb q6h while inpatient, change to PRN on discharge. Please assure pt has rx for nebs and nebulizer machine on discharge.    right prosthetic  hip infection  - Blood cultures times 2 NGTD  -  to go home on po suppression with minocycline  - ID consult appreciated  - CT of right hip done  - no plans for surgery or drainage    cough  - c/w duoneb  - pulm called    aravind lower ext edema with dyspnea  - CXR  - doppler  negative  - iv lasix 40 qd  - change to po on discharge    JANKI  - follow up labs today  - if cre high will stop lasix    severe prosthetic aortic valve stenosis  - cardio consult following  - conservative management    anemia  - iron studies c/w iron def  - FOBT  - iv iron  -  s/p transfusion 1 unit prbc    afib , AVR  - c/w metoprolol  - c/w eliquis 2.5 bid    CAD  - c/w plavix    COPD  - c/w inhalers    BPH  - flomax and finasteride    HLD  - c/w lipitor    dvt px  - on eliquis    d/c planning  - home  - d/w son and case management

## 2023-12-17 LAB
ANION GAP SERPL CALC-SCNC: 12 MMOL/L — SIGNIFICANT CHANGE UP (ref 5–17)
ANION GAP SERPL CALC-SCNC: 12 MMOL/L — SIGNIFICANT CHANGE UP (ref 5–17)
BUN SERPL-MCNC: 46 MG/DL — HIGH (ref 7–23)
BUN SERPL-MCNC: 46 MG/DL — HIGH (ref 7–23)
CALCIUM SERPL-MCNC: 9.1 MG/DL — SIGNIFICANT CHANGE UP (ref 8.4–10.5)
CALCIUM SERPL-MCNC: 9.1 MG/DL — SIGNIFICANT CHANGE UP (ref 8.4–10.5)
CHLORIDE SERPL-SCNC: 97 MMOL/L — SIGNIFICANT CHANGE UP (ref 96–108)
CHLORIDE SERPL-SCNC: 97 MMOL/L — SIGNIFICANT CHANGE UP (ref 96–108)
CO2 SERPL-SCNC: 27 MMOL/L — SIGNIFICANT CHANGE UP (ref 22–31)
CO2 SERPL-SCNC: 27 MMOL/L — SIGNIFICANT CHANGE UP (ref 22–31)
CREAT SERPL-MCNC: 1.5 MG/DL — HIGH (ref 0.5–1.3)
CREAT SERPL-MCNC: 1.5 MG/DL — HIGH (ref 0.5–1.3)
EGFR: 43 ML/MIN/1.73M2 — LOW
EGFR: 43 ML/MIN/1.73M2 — LOW
GLUCOSE SERPL-MCNC: 103 MG/DL — HIGH (ref 70–99)
GLUCOSE SERPL-MCNC: 103 MG/DL — HIGH (ref 70–99)
HCT VFR BLD CALC: 29.2 % — LOW (ref 39–50)
HCT VFR BLD CALC: 29.2 % — LOW (ref 39–50)
HGB BLD-MCNC: 8.9 G/DL — LOW (ref 13–17)
HGB BLD-MCNC: 8.9 G/DL — LOW (ref 13–17)
MCHC RBC-ENTMCNC: 30.5 GM/DL — LOW (ref 32–36)
MCHC RBC-ENTMCNC: 30.5 GM/DL — LOW (ref 32–36)
MCHC RBC-ENTMCNC: 30.9 PG — SIGNIFICANT CHANGE UP (ref 27–34)
MCHC RBC-ENTMCNC: 30.9 PG — SIGNIFICANT CHANGE UP (ref 27–34)
MCV RBC AUTO: 101.4 FL — HIGH (ref 80–100)
MCV RBC AUTO: 101.4 FL — HIGH (ref 80–100)
NRBC # BLD: 0 /100 WBCS — SIGNIFICANT CHANGE UP (ref 0–0)
NRBC # BLD: 0 /100 WBCS — SIGNIFICANT CHANGE UP (ref 0–0)
PLATELET # BLD AUTO: 147 K/UL — LOW (ref 150–400)
PLATELET # BLD AUTO: 147 K/UL — LOW (ref 150–400)
POTASSIUM SERPL-MCNC: 4.4 MMOL/L — SIGNIFICANT CHANGE UP (ref 3.5–5.3)
POTASSIUM SERPL-MCNC: 4.4 MMOL/L — SIGNIFICANT CHANGE UP (ref 3.5–5.3)
POTASSIUM SERPL-SCNC: 4.4 MMOL/L — SIGNIFICANT CHANGE UP (ref 3.5–5.3)
POTASSIUM SERPL-SCNC: 4.4 MMOL/L — SIGNIFICANT CHANGE UP (ref 3.5–5.3)
RBC # BLD: 2.88 M/UL — LOW (ref 4.2–5.8)
RBC # BLD: 2.88 M/UL — LOW (ref 4.2–5.8)
RBC # FLD: 16.6 % — HIGH (ref 10.3–14.5)
RBC # FLD: 16.6 % — HIGH (ref 10.3–14.5)
SODIUM SERPL-SCNC: 136 MMOL/L — SIGNIFICANT CHANGE UP (ref 135–145)
SODIUM SERPL-SCNC: 136 MMOL/L — SIGNIFICANT CHANGE UP (ref 135–145)
WBC # BLD: 9.47 K/UL — SIGNIFICANT CHANGE UP (ref 3.8–10.5)
WBC # BLD: 9.47 K/UL — SIGNIFICANT CHANGE UP (ref 3.8–10.5)
WBC # FLD AUTO: 9.47 K/UL — SIGNIFICANT CHANGE UP (ref 3.8–10.5)
WBC # FLD AUTO: 9.47 K/UL — SIGNIFICANT CHANGE UP (ref 3.8–10.5)

## 2023-12-17 RX ADMIN — Medication 3 MILLILITER(S): at 13:07

## 2023-12-17 RX ADMIN — TAMSULOSIN HYDROCHLORIDE 0.4 MILLIGRAM(S): 0.4 CAPSULE ORAL at 21:56

## 2023-12-17 RX ADMIN — APIXABAN 2.5 MILLIGRAM(S): 2.5 TABLET, FILM COATED ORAL at 17:32

## 2023-12-17 RX ADMIN — APIXABAN 2.5 MILLIGRAM(S): 2.5 TABLET, FILM COATED ORAL at 05:50

## 2023-12-17 RX ADMIN — Medication 100 MILLIGRAM(S): at 05:50

## 2023-12-17 RX ADMIN — Medication 40 MILLIGRAM(S): at 05:50

## 2023-12-17 RX ADMIN — Medication 10 MILLIEQUIVALENT(S): at 13:08

## 2023-12-17 RX ADMIN — Medication 100 MILLIGRAM(S): at 17:32

## 2023-12-17 RX ADMIN — Medication 3 MILLILITER(S): at 00:14

## 2023-12-17 RX ADMIN — SENNA PLUS 2 TABLET(S): 8.6 TABLET ORAL at 21:57

## 2023-12-17 RX ADMIN — FINASTERIDE 5 MILLIGRAM(S): 5 TABLET, FILM COATED ORAL at 13:07

## 2023-12-17 RX ADMIN — Medication 75 MICROGRAM(S): at 05:50

## 2023-12-17 RX ADMIN — ATORVASTATIN CALCIUM 40 MILLIGRAM(S): 80 TABLET, FILM COATED ORAL at 21:57

## 2023-12-17 RX ADMIN — TIOTROPIUM BROMIDE 2 PUFF(S): 18 CAPSULE ORAL; RESPIRATORY (INHALATION) at 13:07

## 2023-12-17 RX ADMIN — Medication 3 MILLILITER(S): at 23:26

## 2023-12-17 RX ADMIN — MINOCYCLINE HYDROCHLORIDE 100 MILLIGRAM(S): 45 TABLET, EXTENDED RELEASE ORAL at 05:50

## 2023-12-17 RX ADMIN — MINOCYCLINE HYDROCHLORIDE 100 MILLIGRAM(S): 45 TABLET, EXTENDED RELEASE ORAL at 17:31

## 2023-12-17 RX ADMIN — Medication 3 MILLILITER(S): at 17:31

## 2023-12-17 RX ADMIN — Medication 3 MILLILITER(S): at 05:50

## 2023-12-17 NOTE — PROGRESS NOTE ADULT - ASSESSMENT
89M with history of HTN, HLD, COPD (on intermittent 2L NC), CAD (no stents), s/p bioprosthetic AVR, CVA in 10/2022, s/p R total hip replacement ~30 years ago, afib/aflutter, hypothyroid, CKD, bladder cancer, and cholangiocarcinoma (s/p treatment, stable) presenting from home with worsening R hip pain. Accompanied by granddaughter at bedside. Patient was found to have UTI in 9/2023 and was treated for 1 month. In 10/2023, patient was found to be bacteremic with septic joint on R hip, was treated with 6 week course of IV antibiotics without surgical debridement or operation given age and other comorbidities. Patient was discharged from [place] to [email or web address] was able to work with PT appropriately. He finished last dose of IV antibiotics and was discharged home on Tuesday. He was seen by Dr. Redding in clinic yesterday and was being planned for IV antibiotics but has not started yet. He started having significant pain on the R hip again since discharge, now unable to bear weight. No fever or chills. No falls or trauma    covid 19 positive  - supportive care  - keep O2 saturation above 92%  - will consider remdesivir    hypoxia  - seen by pulm   -Recently discharged from rehab with o2 PRN  -Mild congestion on CXR. Keep O>I as tolerated.   -O2 lowered to 1LNC  -Check o2 sats on RA and document. Keep sats >88% with o2 PRN. Pt will likely require o2 qHS and PRN.   -?Underlying COPD  -Continue Duoneb q6h while inpatient, change to PRN on discharge. Please assure pt has rx for nebs and nebulizer machine on discharge.    right prosthetic  hip infection  - Blood cultures times 2 NGTD  -  to go home on po suppression with minocycline  - ID consult appreciated  - CT of right hip done  - no plans for surgery or drainage    cough  - c/w duoneb  - pulm called    aravind lower ext edema with dyspnea  - CXR  - doppler  negative  - iv lasix 40 qd  - change to po on discharge    JANKI  - follow up labs today  - if cre high will stop lasix    severe prosthetic aortic valve stenosis  - cardio consult following  - conservative management    anemia  - iron studies c/w iron def  - FOBT  - iv iron  -  s/p transfusion 1 unit prbc    afib , AVR  - c/w metoprolol  - c/w eliquis 2.5 bid    CAD  - c/w plavix    COPD  - c/w inhalers    BPH  - flomax and finasteride    HLD  - c/w lipitor    dvt px  - on eliquis    d/c planning  - home  - d/w son and case management              89M with history of HTN, HLD, COPD (on intermittent 2L NC), CAD (no stents), s/p bioprosthetic AVR, CVA in 10/2022, s/p R total hip replacement ~30 years ago, afib/aflutter, hypothyroid, CKD, bladder cancer, and cholangiocarcinoma (s/p treatment, stable) presenting from home with worsening R hip pain. Accompanied by granddaughter at bedside. Patient was found to have UTI in 9/2023 and was treated for 1 month. In 10/2023, patient was found to be bacteremic with septic joint on R hip, was treated with 6 week course of IV antibiotics without surgical debridement or operation given age and other comorbidities. Patient was discharged from [place] to [facility], was able to work with PT appropriately. He finished last dose of IV antibiotics and was discharged home on Tuesday. He was seen by Dr. Redding in clinic yesterday and was being planned for IV antibiotics but has not started yet. He started having significant pain on the R hip again since discharge, now unable to bear weight. No fever or chills. No falls or trauma    covid 19 positive  - supportive care  - keep O2 saturation above 92%  - will consider remdesivir    hypoxia  - seen by pulm   -Recently discharged from rehab with o2 PRN  -Mild congestion on CXR. Keep O>I as tolerated.   -O2 lowered to 1LNC  -Check o2 sats on RA and document. Keep sats >88% with o2 PRN. Pt will likely require o2 qHS and PRN.   -?Underlying COPD  -Continue Duoneb q6h while inpatient, change to PRN on discharge. Please assure pt has rx for nebs and nebulizer machine on discharge.    right prosthetic  hip infection  - Blood cultures times 2 NGTD  -  to go home on po suppression with minocycline  - ID consult appreciated  - CT of right hip done  - no plans for surgery or drainage    cough  - c/w duoneb  - pulm called    aravind lower ext edema with dyspnea  - CXR  - doppler  negative  - iv lasix 40 qd  - change to po on discharge    JANKI  - follow up labs today  - if cre high will stop lasix    severe prosthetic aortic valve stenosis  - cardio consult following  - conservative management    anemia  - iron studies c/w iron def  - FOBT  - iv iron  -  s/p transfusion 1 unit prbc    afib , AVR  - c/w metoprolol  - c/w eliquis 2.5 bid    CAD  - c/w plavix    COPD  - c/w inhalers    BPH  - flomax and finasteride    HLD  - c/w lipitor    dvt px  - on eliquis    d/c planning  - home  - d/w son and case management              89M with history of HTN, HLD, COPD (on intermittent 2L NC), CAD (no stents), s/p bioprosthetic AVR, CVA in 10/2022, s/p R total hip replacement ~30 years ago, afib/aflutter, hypothyroid, CKD, bladder cancer, and cholangiocarcinoma (s/p treatment, stable) presenting from home with worsening R hip pain. Accompanied by granddaughter at bedside. Patient was found to have UTI in 9/2023 and was treated for 1 month. In 10/2023, patient was found to be bacteremic with septic joint on R hip, was treated with 6 week course of IV antibiotics without surgical debridement or operation given age and other comorbidities. Patient was discharged from [place] to [facility], was able to work with PT appropriately. He finished last dose of IV antibiotics and was discharged home on Tuesday. He was seen by Dr. Redding in clinic yesterday and was being planned for IV antibiotics but has not started yet. He started having significant pain on the R hip again since discharge, now unable to bear weight. No fever or chills. No falls or trauma    covid 19 positive  - supportive care  - keep O2 saturation above 92%  - will consider remdesivir    hypoxia  - seen by pulm   -Recently discharged from rehab with o2 PRN  -Mild congestion on CXR. Keep O>I as tolerated.   -O2 lowered to 1LNC  -Check o2 sats on RA and document. Keep sats >88% with o2 PRN. Pt will likely require o2 qHS and PRN.   -?Underlying COPD  -Continue Duoneb q6h while inpatient, change to PRN on discharge. Please assure pt has rx for nebs and nebulizer machine on discharge.    right prosthetic  hip infection  - Blood cultures times 2 NGTD  -  to go home on po suppression with minocycline  - ID consult appreciated  - CT of right hip done  - no plans for surgery or drainage    cough  - c/w duoneb  - pulm called    aravind lower ext edema with dyspnea  - CXR  - doppler  negative  - stop iv lasix 40 qd  - change to po on discharge    JANKI  - monitor cre  - will stop lasix    severe prosthetic aortic valve stenosis  - cardio consult following  - conservative management    anemia  - iron studies c/w iron def  - FOBT  - iv iron  -  s/p transfusion 1 unit prbc    afib , AVR  - c/w metoprolol  - c/w eliquis 2.5 bid    CAD  - c/w plavix    COPD  - c/w inhalers    BPH  - flomax and finasteride    HLD  - c/w lipitor    dvt px  - on eliquis    d/c planning  - home  - d/w son and case management              89M with history of HTN, HLD, COPD (on intermittent 2L NC), CAD (no stents), s/p bioprosthetic AVR, CVA in 10/2022, s/p R total hip replacement ~30 years ago, afib/aflutter, hypothyroid, CKD, bladder cancer, and cholangiocarcinoma (s/p treatment, stable) presenting from home with worsening R hip pain. Accompanied by granddaughter at bedside. Patient was found to have UTI in 9/2023 and was treated for 1 month. In 10/2023, patient was found to be bacteremic with septic joint on R hip, was treated with 6 week course of IV antibiotics without surgical debridement or operation given age and other comorbidities. Patient was discharged from [place] to [facility], was able to work with PT appropriately. He finished last dose of IV antibiotics and was discharged home on Tuesday. He was seen by Dr. Redding in clinic yesterday and was being planned for IV antibiotics but has not started yet. He started having significant pain on the R hip again since discharge, now unable to bear weight. No fever or chills. No falls or trauma    covid 19 positive  - supportive care  - keep O2 saturation above 92%  - will consider remdesivir    hypoxia  - seen by pulm   -Recently discharged from rehab with o2 PRN  -Mild congestion on CXR. Keep O>I as tolerated.   -O2 lowered to 1LNC  -Check o2 sats on RA and document. Keep sats >88% with o2 PRN. Pt will likely require o2 qHS and PRN.   -?Underlying COPD  -Continue Duoneb q6h while inpatient, change to PRN on discharge. Please assure pt has rx for nebs and nebulizer machine on discharge.    right prosthetic  hip infection  - Blood cultures times 2 NGTD  -  to go home on po suppression with minocycline  - ID consult appreciated  - CT of right hip done  - no plans for surgery or drainage    cough  - c/w duoneb  - pulm called    aravind lower ext edema with dyspnea  - CXR  - doppler  negative  - stop iv lasix 40 qd  - change to po on discharge    JANKI  - monitor cre  - will stop lasix    severe prosthetic aortic valve stenosis  - cardio consult following  - conservative management    anemia  - iron studies c/w iron def  - FOBT  - iv iron  -  s/p transfusion 1 unit prbc    afib , AVR  - c/w metoprolol  - c/w eliquis 2.5 bid    CAD  - c/w plavix    COPD  - c/w inhalers    BPH  - flomax and finasteride    HLD  - c/w lipitor    dvt px  - on eliquis    d/c planning  - home  - d/w son and case management

## 2023-12-17 NOTE — PROGRESS NOTE ADULT - SUBJECTIVE AND OBJECTIVE BOX
Subjective: Patient seen and examined. No new events except as noted.     REVIEW OF SYSTEMS:    CONSTITUTIONAL: + weakness, fevers or chills  EYES/ENT: No visual changes;  No vertigo or throat pain   NECK: No pain or stiffness  RESPIRATORY: No cough, wheezing, hemoptysis; No shortness of breath  CARDIOVASCULAR: No chest pain or palpitations  GASTROINTESTINAL: No abdominal or epigastric pain. No nausea, vomiting, or hematemesis; No diarrhea or constipation. No melena or hematochezia.  GENITOURINARY: No dysuria, frequency or hematuria  NEUROLOGICAL: No numbness or weakness  SKIN: No itching, burning, rashes, or lesions   All other review of systems is negative unless indicated above.    MEDICATIONS:  MEDICATIONS  (STANDING):  albuterol/ipratropium for Nebulization 3 milliLiter(s) Nebulizer every 6 hours  apixaban 2.5 milliGRAM(s) Oral two times a day  atorvastatin 40 milliGRAM(s) Oral at bedtime  finasteride 5 milliGRAM(s) Oral daily  furosemide   Injectable 40 milliGRAM(s) IV Push daily  levothyroxine 75 MICROGram(s) Oral daily  metoprolol succinate  milliGRAM(s) Oral two times a day  minocycline 100 milliGRAM(s) Oral every 12 hours  potassium chloride    Tablet ER 10 milliEquivalent(s) Oral daily  senna 2 Tablet(s) Oral at bedtime  tamsulosin 0.4 milliGRAM(s) Oral at bedtime  tiotropium 2.5 MICROgram(s) Inhaler 2 Puff(s) Inhalation daily      PHYSICAL EXAM:  T(C): 36.6 (12-17-23 @ 09:45), Max: 36.8 (12-16-23 @ 19:24)  HR: 75 (12-17-23 @ 09:45) (75 - 77)  BP: 103/58 (12-17-23 @ 09:45) (100/63 - 108/64)  RR: 18 (12-17-23 @ 09:45) (18 - 19)  SpO2: 93% (12-17-23 @ 09:45) (92% - 100%)  Wt(kg): --  I&O's Summary    16 Dec 2023 07:01  -  17 Dec 2023 07:00  --------------------------------------------------------  IN: 840 mL / OUT: 951 mL / NET: -111 mL    17 Dec 2023 07:01  -  17 Dec 2023 11:07  --------------------------------------------------------  IN: 240 mL / OUT: 500 mL / NET: -260 mL          Appearance: NAD  HEENT:   Dry  oral mucosa, PERRL, EOMI	  Lymphatic: No lymphadenopathy  Cardiovascular: Normal S1 S2, No JVD, + systolic murmur  Respiratory: Lungs clear to auscultation	  Psychiatry: A & O x 3, Mood & affect appropriate  Gastrointestinal:  Soft, Non-tender, + BS	  Skin: No rashes, No ecchymoses, No cyanosis	  Neurologic: Non-focal  Extremities: Normal range of motion, No clubbing, cyanosis + edema  Vascular: Peripheral pulses palpable 2+ bilaterally  LABS:    CARDIAC MARKERS:                                8.9    9.47  )-----------( 147      ( 17 Dec 2023 06:39 )             29.2     12-17    136  |  97  |  46<H>  ----------------------------<  103<H>  4.4   |  27  |  1.50<H>    Ca    9.1      17 Dec 2023 06:39    TPro  6.6  /  Alb  2.9<L>  /  TBili  0.7  /  DBili  x   /  AST  40  /  ALT  41  /  AlkPhos  226<H>  12-16    proBNP:   Lipid Profile:   HgA1c:   TSH:             TELEMETRY: 	    ECG:  	  RADIOLOGY:   DIAGNOSTIC TESTING:  [ ] Echocardiogram:  [ ]  Catheterization:  [ ] Stress Test:    OTHER:

## 2023-12-17 NOTE — PROGRESS NOTE ADULT - SUBJECTIVE AND OBJECTIVE BOX
DATE OF SERVICE: 12-17-23 @ 15:32    Patient is a 89y old  Male who presents with a chief complaint of fever (14 Dec 2023 08:58)      SUBJECTIVE / OVERNIGHT EVENTS:  No chest pain. No shortness of breath. No complaints. No events overnight.     MEDICATIONS  (STANDING):  albuterol/ipratropium for Nebulization 3 milliLiter(s) Nebulizer every 6 hours  apixaban 2.5 milliGRAM(s) Oral two times a day  atorvastatin 40 milliGRAM(s) Oral at bedtime  finasteride 5 milliGRAM(s) Oral daily  furosemide   Injectable 40 milliGRAM(s) IV Push daily  levothyroxine 75 MICROGram(s) Oral daily  metoprolol succinate  milliGRAM(s) Oral two times a day  minocycline 100 milliGRAM(s) Oral every 12 hours  potassium chloride    Tablet ER 10 milliEquivalent(s) Oral daily  senna 2 Tablet(s) Oral at bedtime  tamsulosin 0.4 milliGRAM(s) Oral at bedtime  tiotropium 2.5 MICROgram(s) Inhaler 2 Puff(s) Inhalation daily    MEDICATIONS  (PRN):  sodium chloride 0.65% Nasal 1 Spray(s) Both Nostrils four times a day PRN Nasal Congestion  traMADol 25 milliGRAM(s) Oral every 6 hours PRN Moderate Pain (4 - 6)  traMADol 50 milliGRAM(s) Oral every 6 hours PRN Severe Pain (7 - 10)      Vital Signs Last 24 Hrs  T(C): 36.6 (17 Dec 2023 09:45), Max: 36.8 (16 Dec 2023 19:24)  T(F): 97.9 (17 Dec 2023 09:45), Max: 98.2 (16 Dec 2023 19:24)  HR: 75 (17 Dec 2023 09:45) (75 - 77)  BP: 103/58 (17 Dec 2023 09:45) (100/63 - 108/64)  BP(mean): --  RR: 18 (17 Dec 2023 09:45) (18 - 18)  SpO2: 93% (17 Dec 2023 09:45) (92% - 100%)    Parameters below as of 17 Dec 2023 09:45  Patient On (Oxygen Delivery Method): room air      CAPILLARY BLOOD GLUCOSE        I&O's Summary    16 Dec 2023 07:01  -  17 Dec 2023 07:00  --------------------------------------------------------  IN: 840 mL / OUT: 951 mL / NET: -111 mL    17 Dec 2023 07:01  -  17 Dec 2023 15:32  --------------------------------------------------------  IN: 480 mL / OUT: 500 mL / NET: -20 mL        PHYSICAL EXAM:  GENERAL: NAD, well-developed  HEAD:  Atraumatic, Normocephalic  EYES: EOMI, PERRLA, conjunctiva and sclera clear  NECK: Supple, No JVD  CHEST/LUNG: Clear to auscultation bilaterally; No wheeze  HEART: Regular rate and rhythm; No murmurs, rubs, or gallops  ABDOMEN: Soft, Nontender, Nondistended; Bowel sounds present  EXTREMITIES:  2+ Peripheral Pulses, No clubbing, cyanosis, aravind  edema improved  PSYCH: AAOx3  NEUROLOGY: non-focal  SKIN: No rashes or lesions    LABS:                        8.9    9.47  )-----------( 147      ( 17 Dec 2023 06:39 )             29.2     12-17    136  |  97  |  46<H>  ----------------------------<  103<H>  4.4   |  27  |  1.50<H>    Ca    9.1      17 Dec 2023 06:39    TPro  6.6  /  Alb  2.9<L>  /  TBili  0.7  /  DBili  x   /  AST  40  /  ALT  41  /  AlkPhos  226<H>  12-16          Urinalysis Basic - ( 17 Dec 2023 06:39 )    Color: x / Appearance: x / SG: x / pH: x  Gluc: 103 mg/dL / Ketone: x  / Bili: x / Urobili: x   Blood: x / Protein: x / Nitrite: x   Leuk Esterase: x / RBC: x / WBC x   Sq Epi: x / Non Sq Epi: x / Bacteria: x        RADIOLOGY & ADDITIONAL TESTS:    Imaging Personally Reviewed:    Consultant(s) Notes Reviewed:      Care Discussed with Consultants/Other Providers:

## 2023-12-17 NOTE — PROGRESS NOTE ADULT - ASSESSMENT
89M with history of HTN, HLD, COPD (on intermittent 2L NC), CAD (no stents), s/p bioprosthetic AVR, CVA in 10/2022, s/p R total hip replacement ~30 years ago, afib/aflutter, hypothyroid, CKD, bladder cancer, and cholangiocarcinoma (s/p treatment, stable) presenting from home with worsening R hip pain. Patient was found to have UTI in 9/2023 and was treated for 1 month. In 10/2023, patient was found to be bacteremic with septic joint on R hip, was treated with 6 week course of IV antibiotics without surgical debridement or operation given age and other comorbidities. Patient was discharged from [place] to [facility], was able to work with PT appropriately. He finished last dose of IV antibiotics and was discharged home on Tuesday. He was seen by Dr. Darby and was being planned for IV antibiotics but has not started yet. He started having significant pain on the R hip again, now unable to bear weight. No fever or chills. 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 Patient was found to have UTI in 9/2023 and was treated for 1 month. In 10/2023, patient was found to be bacteremic with septic joint on R hip, was treated with 6 week course of IV antibiotics without surgical debridement or operation given age and other comorbidities. Patient was discharged from [place] to [facility], was able to work with PT appropriately. He finished last dose of IV antibiotics and was discharged home on Tuesday. He was seen by Dr. Darby and was being planned for IV antibiotics but has not started yet. He started having significant pain on the R hip again, now unable to bear weight. No fever or chills. 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

## 2023-12-18 ENCOUNTER — TRANSCRIPTION ENCOUNTER (OUTPATIENT)
Age: 88
End: 2023-12-18

## 2023-12-18 VITALS
HEART RATE: 75 BPM | TEMPERATURE: 98 F | SYSTOLIC BLOOD PRESSURE: 110 MMHG | OXYGEN SATURATION: 94 % | DIASTOLIC BLOOD PRESSURE: 63 MMHG | RESPIRATION RATE: 18 BRPM

## 2023-12-18 PROCEDURE — 83735 ASSAY OF MAGNESIUM: CPT

## 2023-12-18 PROCEDURE — 86985 SPLIT BLOOD OR PRODUCTS: CPT

## 2023-12-18 PROCEDURE — 97116 GAIT TRAINING THERAPY: CPT

## 2023-12-18 PROCEDURE — 86923 COMPATIBILITY TEST ELECTRIC: CPT

## 2023-12-18 PROCEDURE — 83540 ASSAY OF IRON: CPT

## 2023-12-18 PROCEDURE — 86850 RBC ANTIBODY SCREEN: CPT

## 2023-12-18 PROCEDURE — 86140 C-REACTIVE PROTEIN: CPT

## 2023-12-18 PROCEDURE — 85025 COMPLETE CBC W/AUTO DIFF WBC: CPT

## 2023-12-18 PROCEDURE — 82947 ASSAY GLUCOSE BLOOD QUANT: CPT

## 2023-12-18 PROCEDURE — 85018 HEMOGLOBIN: CPT

## 2023-12-18 PROCEDURE — 72170 X-RAY EXAM OF PELVIS: CPT

## 2023-12-18 PROCEDURE — 96374 THER/PROPH/DIAG INJ IV PUSH: CPT

## 2023-12-18 PROCEDURE — 83605 ASSAY OF LACTIC ACID: CPT

## 2023-12-18 PROCEDURE — 85027 COMPLETE CBC AUTOMATED: CPT

## 2023-12-18 PROCEDURE — 85014 HEMATOCRIT: CPT

## 2023-12-18 PROCEDURE — 82803 BLOOD GASES ANY COMBINATION: CPT

## 2023-12-18 PROCEDURE — 86900 BLOOD TYPING SEROLOGIC ABO: CPT

## 2023-12-18 PROCEDURE — 85610 PROTHROMBIN TIME: CPT

## 2023-12-18 PROCEDURE — 84132 ASSAY OF SERUM POTASSIUM: CPT

## 2023-12-18 PROCEDURE — 85379 FIBRIN DEGRADATION QUANT: CPT

## 2023-12-18 PROCEDURE — 82272 OCCULT BLD FECES 1-3 TESTS: CPT

## 2023-12-18 PROCEDURE — 93306 TTE W/DOPPLER COMPLETE: CPT

## 2023-12-18 PROCEDURE — 0225U NFCT DS DNA&RNA 21 SARSCOV2: CPT

## 2023-12-18 PROCEDURE — 83550 IRON BINDING TEST: CPT

## 2023-12-18 PROCEDURE — 82746 ASSAY OF FOLIC ACID SERUM: CPT

## 2023-12-18 PROCEDURE — 86901 BLOOD TYPING SEROLOGIC RH(D): CPT

## 2023-12-18 PROCEDURE — 71045 X-RAY EXAM CHEST 1 VIEW: CPT

## 2023-12-18 PROCEDURE — 82330 ASSAY OF CALCIUM: CPT

## 2023-12-18 PROCEDURE — 82728 ASSAY OF FERRITIN: CPT

## 2023-12-18 PROCEDURE — 84100 ASSAY OF PHOSPHORUS: CPT

## 2023-12-18 PROCEDURE — 36415 COLL VENOUS BLD VENIPUNCTURE: CPT

## 2023-12-18 PROCEDURE — 73502 X-RAY EXAM HIP UNI 2-3 VIEWS: CPT

## 2023-12-18 PROCEDURE — P9011: CPT

## 2023-12-18 PROCEDURE — 84145 PROCALCITONIN (PCT): CPT

## 2023-12-18 PROCEDURE — 72193 CT PELVIS W/DYE: CPT

## 2023-12-18 PROCEDURE — 94640 AIRWAY INHALATION TREATMENT: CPT

## 2023-12-18 PROCEDURE — 82607 VITAMIN B-12: CPT

## 2023-12-18 PROCEDURE — 84295 ASSAY OF SERUM SODIUM: CPT

## 2023-12-18 PROCEDURE — 80053 COMPREHEN METABOLIC PANEL: CPT

## 2023-12-18 PROCEDURE — 96375 TX/PRO/DX INJ NEW DRUG ADDON: CPT

## 2023-12-18 PROCEDURE — 73552 X-RAY EXAM OF FEMUR 2/>: CPT

## 2023-12-18 PROCEDURE — 99285 EMERGENCY DEPT VISIT HI MDM: CPT

## 2023-12-18 PROCEDURE — 85652 RBC SED RATE AUTOMATED: CPT

## 2023-12-18 PROCEDURE — 36430 TRANSFUSION BLD/BLD COMPNT: CPT

## 2023-12-18 PROCEDURE — 85730 THROMBOPLASTIN TIME PARTIAL: CPT

## 2023-12-18 PROCEDURE — 80048 BASIC METABOLIC PNL TOTAL CA: CPT

## 2023-12-18 PROCEDURE — 84443 ASSAY THYROID STIM HORMONE: CPT

## 2023-12-18 PROCEDURE — 84439 ASSAY OF FREE THYROXINE: CPT

## 2023-12-18 PROCEDURE — 97161 PT EVAL LOW COMPLEX 20 MIN: CPT

## 2023-12-18 PROCEDURE — 97530 THERAPEUTIC ACTIVITIES: CPT

## 2023-12-18 PROCEDURE — 82435 ASSAY OF BLOOD CHLORIDE: CPT

## 2023-12-18 PROCEDURE — 93970 EXTREMITY STUDY: CPT

## 2023-12-18 PROCEDURE — 87040 BLOOD CULTURE FOR BACTERIA: CPT

## 2023-12-18 RX ORDER — FINASTERIDE 5 MG/1
1 TABLET, FILM COATED ORAL
Qty: 0 | Refills: 0 | DISCHARGE
Start: 2023-12-18

## 2023-12-18 RX ORDER — TIOTROPIUM BROMIDE 18 UG/1
2 CAPSULE ORAL; RESPIRATORY (INHALATION)
Qty: 1 | Refills: 0
Start: 2023-12-18 | End: 2024-01-16

## 2023-12-18 RX ORDER — MINOCYCLINE HYDROCHLORIDE 45 MG/1
1 TABLET, EXTENDED RELEASE ORAL
Qty: 60 | Refills: 0
Start: 2023-12-18 | End: 2024-01-16

## 2023-12-18 RX ORDER — FUROSEMIDE 40 MG
1 TABLET ORAL
Qty: 30 | Refills: 0
Start: 2023-12-18 | End: 2024-01-16

## 2023-12-18 RX ORDER — METOPROLOL TARTRATE 50 MG
1 TABLET ORAL
Qty: 0 | Refills: 0 | DISCHARGE

## 2023-12-18 RX ORDER — TRAMADOL HYDROCHLORIDE 50 MG/1
0.5 TABLET ORAL
Qty: 10 | Refills: 0
Start: 2023-12-18 | End: 2023-12-22

## 2023-12-18 RX ORDER — ALBUTEROL 90 UG/1
2 AEROSOL, METERED ORAL
Qty: 1 | Refills: 0
Start: 2023-12-18 | End: 2024-01-16

## 2023-12-18 RX ORDER — CLOPIDOGREL BISULFATE 75 MG/1
1 TABLET, FILM COATED ORAL
Refills: 0 | DISCHARGE

## 2023-12-18 RX ORDER — TIOTROPIUM BROMIDE 18 UG/1
1 CAPSULE ORAL; RESPIRATORY (INHALATION)
Refills: 0 | DISCHARGE

## 2023-12-18 RX ORDER — FUROSEMIDE 40 MG
1 TABLET ORAL
Refills: 0 | DISCHARGE

## 2023-12-18 RX ORDER — FINASTERIDE 5 MG/1
1 TABLET, FILM COATED ORAL
Refills: 0 | DISCHARGE

## 2023-12-18 RX ORDER — APIXABAN 2.5 MG/1
1 TABLET, FILM COATED ORAL
Qty: 60 | Refills: 0
Start: 2023-12-18 | End: 2024-01-16

## 2023-12-18 RX ORDER — NALOXONE HYDROCHLORIDE 4 MG/.1ML
4 SPRAY NASAL
Qty: 1 | Refills: 0
Start: 2023-12-18 | End: 2023-12-18

## 2023-12-18 RX ORDER — POTASSIUM CHLORIDE 20 MEQ
1 PACKET (EA) ORAL
Refills: 0 | DISCHARGE

## 2023-12-18 RX ORDER — POTASSIUM CHLORIDE 20 MEQ
1 PACKET (EA) ORAL
Qty: 30 | Refills: 0
Start: 2023-12-18 | End: 2024-01-16

## 2023-12-18 RX ORDER — APIXABAN 2.5 MG/1
1 TABLET, FILM COATED ORAL
Refills: 0 | DISCHARGE

## 2023-12-18 RX ORDER — FINASTERIDE 5 MG/1
1 TABLET, FILM COATED ORAL
Qty: 30 | Refills: 0
Start: 2023-12-18 | End: 2024-01-16

## 2023-12-18 RX ORDER — CHOLECALCIFEROL (VITAMIN D3) 125 MCG
1 CAPSULE ORAL
Refills: 0 | DISCHARGE

## 2023-12-18 RX ADMIN — Medication 3 MILLILITER(S): at 05:54

## 2023-12-18 RX ADMIN — Medication 10 MILLIEQUIVALENT(S): at 12:35

## 2023-12-18 RX ADMIN — Medication 100 MILLIGRAM(S): at 05:54

## 2023-12-18 RX ADMIN — Medication 100 MILLIGRAM(S): at 17:19

## 2023-12-18 RX ADMIN — TIOTROPIUM BROMIDE 2 PUFF(S): 18 CAPSULE ORAL; RESPIRATORY (INHALATION) at 12:34

## 2023-12-18 RX ADMIN — Medication 3 MILLILITER(S): at 12:59

## 2023-12-18 RX ADMIN — APIXABAN 2.5 MILLIGRAM(S): 2.5 TABLET, FILM COATED ORAL at 17:19

## 2023-12-18 RX ADMIN — MINOCYCLINE HYDROCHLORIDE 100 MILLIGRAM(S): 45 TABLET, EXTENDED RELEASE ORAL at 05:54

## 2023-12-18 RX ADMIN — Medication 3 MILLILITER(S): at 17:18

## 2023-12-18 RX ADMIN — Medication 75 MICROGRAM(S): at 05:54

## 2023-12-18 RX ADMIN — MINOCYCLINE HYDROCHLORIDE 100 MILLIGRAM(S): 45 TABLET, EXTENDED RELEASE ORAL at 17:19

## 2023-12-18 RX ADMIN — FINASTERIDE 5 MILLIGRAM(S): 5 TABLET, FILM COATED ORAL at 12:34

## 2023-12-18 RX ADMIN — APIXABAN 2.5 MILLIGRAM(S): 2.5 TABLET, FILM COATED ORAL at 05:54

## 2023-12-18 NOTE — DISCHARGE NOTE PROVIDER - PROVIDER TOKENS
PROVIDER:[TOKEN:[[id number]],FOLLOWUP:[2 weeks]],FREE:[LAST:[Scrotcha],FIRST:[Dr],PHONE:[(   )    -],FAX:[(   )    -],ADDRESS:[PMD]] PROVIDER:[TOKEN:[[id number]],FOLLOWUP:[2 weeks]],FREE:[LAST:[Scrotcha],FIRST:[Dr],PHONE:[(   )    -],FAX:[(   )    -],ADDRESS:[PMD]] PROVIDER:[TOKEN:[[id number]],FOLLOWUP:[2 weeks]],FREE:[LAST:[Scrotcha],FIRST:[Dr],PHONE:[(   )    -],FAX:[(   )    -],ADDRESS:[PMD]],PROVIDER:[TOKEN:[[id number]]] PROVIDER:[TOKEN:[[id number]],FOLLOWUP:[2 weeks]],FREE:[LAST:[Scrotcha],FIRST:[Dr],PHONE:[(   )    -],FAX:[(   )    -],ADDRESS:[PMD]],PROVIDER:[TOKEN:[[id number]]]

## 2023-12-18 NOTE — PROGRESS NOTE ADULT - PROBLEM SELECTOR PLAN 2
RVP COVID + 12/15  -CXR with mild congestion  -Previously with cough, now denies  -Can consider Remdesivir if aligns with GOC  -Favor monitoring off steroids given infected hip prothesis  -Normoxic at rest at this time  -Trend inflammatory markers  -AC with Eliquis for afib.

## 2023-12-18 NOTE — DISCHARGE NOTE NURSING/CASE MANAGEMENT/SOCIAL WORK - PATIENT PORTAL LINK FT
You can access the FollowMyHealth Patient Portal offered by [facility] by registering at the following website: [email or web address]. By joining [facility]’s FollowMyHealth portal, you will also be able to view your health information using other applications (apps) compatible with our system. You can access the FollowMyHealth Patient Portal offered by [facility] by registering at the following website: [email or web address]. By joining [employer]’s FollowMyHealth portal, you will also be able to view your health information using other applications (apps) compatible with our system.

## 2023-12-18 NOTE — PROGRESS NOTE ADULT - PROBLEM SELECTOR PROBLEM 3
Atrial fibrillation
Infected prosthetic hip
Atrial fibrillation
Infected prosthetic hip
Atrial fibrillation

## 2023-12-18 NOTE — DIETITIAN INITIAL EVALUATION ADULT - REASON FOR ADMISSION
Pain of right hip    Per chart, patient is a 89 y/o male with PMH including HTN, HLD, COPD (on intermittent 2L NC), CAD (no stents), s/p bioprosthetic AVR, h/o CVA (10/2022), s/p R total hip replacement ~30 years ago, AFib/AFlutter, hypothyroidism, CKD, h/o bladder cancer, h/o cholangiocarcinoma (s/p treatment, stable per MD). Patient presents to [facility] w/ worsening R hip pain. Identified w/ R hip infection per MD. Hospital course c/b COVID-19. Pain of right hip    Per chart, patient is a 89 y/o male with PMH including HTN, HLD, COPD (on intermittent 2L NC), CAD (no stents), s/p bioprosthetic AVR, h/o CVA (10/2022), s/p R total hip replacement ~30 years ago, AFib/AFlutter, hypothyroidism, CKD, h/o bladder cancer, h/o cholangiocarcinoma (s/p treatment, stable per MD). Patient presents to [facility] w/ worsening R hip pain. Identified w/ R hip infection per MD. Hospital course c/b COVID-19.

## 2023-12-18 NOTE — DIETITIAN INITIAL EVALUATION ADULT - REASON
Patient eating well PTA w/ no recent significant weight changes reported, eating well during admission, will monitor parameters

## 2023-12-18 NOTE — DISCHARGE NOTE PROVIDER - NSDCCPCAREPLAN_GEN_ALL_CORE_FT
PRINCIPAL DISCHARGE DIAGNOSIS  Diagnosis: Infected prosthetic hip  Assessment and Plan of Treatment: You were seen by infectious disease and orthopedics  Your blood cultures remained negative  You will be on lifelong immunosuppression with minocycline  Follow up with Dr Vickers outpatient.      SECONDARY DISCHARGE DIAGNOSES  Diagnosis: 2019 novel coronavirus disease (COVID-19)  Assessment and Plan of Treatment: Use your nebulizer as needed.   You tested positive for COVID 19.  You no longer require hospitalization.  Please restrict activities outside of your home except for getting medical care.   Wear a facemask when you are around other people. Cover your cough and sneezes.  Clean your hands often.  Avoid sharing personal household items.  Clean all frequently touched surfaces daily.      Diagnosis: Hypertension  Assessment and Plan of Treatment: Low salt diet  Activity as tolerated.  Take all medication as prescribed.  Follow up with your medical doctor for routine blood pressure monitoring at your next visit.  Notify your doctor if you have any of the following symptoms:   Dizziness, Lightheadedness, Blurry vision, Headache, Chest pain, Shortness of breath      Diagnosis: Atrial fibrillation  Assessment and Plan of Treatment: Continue Eliquis  Atrial fibrillation is the most common heart rhythm problem & has the risk of stroke & heart attack  It helps if you control your blood pressure, not drink more than 1-2 alcohol drinks per day, cut down on caffeine, getting treatment for over active thyroid gland, & getting exercise  Call your doctor if you feel your heart racing or beating unusually, chest tightness or pain, lightheaded, faint, shortness of breath especially with exercise  It is important to take your heart medication as prescribed      Diagnosis: Severe aortic stenosis  Assessment and Plan of Treatment: Conservative management  Continue Lasix as prescribed.

## 2023-12-18 NOTE — PROGRESS NOTE ADULT - ASSESSMENT
89M with history of HTN, HLD, COPD (on intermittent 2L NC), CAD (no stents), s/p bioprosthetic AVR, CVA in 10/2022, s/p R total hip replacement ~30 years ago, afib/aflutter, hypothyroid, CKD, bladder cancer, and cholangiocarcinoma (s/p treatment, stable) presenting from home with worsening R hip pain. Patient was found to have UTI in 9/2023 and was treated for 1 month. In 10/2023, patient was found to be bacteremic with septic joint on R hip, was treated with 6 week course of IV antibiotics without surgical debridement or operation given age and other comorbidities. Patient was discharged from [place] to [facility], was able to work with PT appropriately. He finished last dose of IV antibiotics and was discharged home on Tuesday. He was seen by Dr. Darby and was being planned for IV antibiotics but has not started yet. He started having significant pain on the R hip again, now unable to bear weight. No fever or chills. 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 Patient was found to have UTI in 9/2023 and was treated for 1 month. In 10/2023, patient was found to be bacteremic with septic joint on R hip, was treated with 6 week course of IV antibiotics without surgical debridement or operation given age and other comorbidities. Patient was discharged from [place] to [facility], was able to work with PT appropriately. He finished last dose of IV antibiotics and was discharged home on Tuesday. He was seen by Dr. Darby and was being planned for IV antibiotics but has not started yet. He started having significant pain on the R hip again, now unable to bear weight. No fever or chills. 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

## 2023-12-18 NOTE — PROGRESS NOTE ADULT - SUBJECTIVE AND OBJECTIVE BOX
Subjective: Patient seen and examined. No new events except as noted.     REVIEW OF SYSTEMS:    CONSTITUTIONAL: + weakness, fevers or chills  EYES/ENT: No visual changes;  No vertigo or throat pain   NECK: No pain or stiffness  RESPIRATORY: No cough, wheezing, hemoptysis; No shortness of breath  CARDIOVASCULAR: No chest pain or palpitations  GASTROINTESTINAL: No abdominal or epigastric pain. No nausea, vomiting, or hematemesis; No diarrhea or constipation. No melena or hematochezia.  GENITOURINARY: No dysuria, frequency or hematuria  NEUROLOGICAL: No numbness or weakness  SKIN: No itching, burning, rashes, or lesions   All other review of systems is negative unless indicated above.    MEDICATIONS:  MEDICATIONS  (STANDING):  albuterol/ipratropium for Nebulization 3 milliLiter(s) Nebulizer every 6 hours  apixaban 2.5 milliGRAM(s) Oral two times a day  atorvastatin 40 milliGRAM(s) Oral at bedtime  finasteride 5 milliGRAM(s) Oral daily  levothyroxine 75 MICROGram(s) Oral daily  metoprolol succinate  milliGRAM(s) Oral two times a day  minocycline 100 milliGRAM(s) Oral every 12 hours  potassium chloride    Tablet ER 10 milliEquivalent(s) Oral daily  senna 2 Tablet(s) Oral at bedtime  tamsulosin 0.4 milliGRAM(s) Oral at bedtime  tiotropium 2.5 MICROgram(s) Inhaler 2 Puff(s) Inhalation daily      PHYSICAL EXAM:  T(C): 36.4 (12-18-23 @ 18:06), Max: 37.1 (12-17-23 @ 20:29)  HR: 75 (12-18-23 @ 18:06) (65 - 79)  BP: 110/63 (12-18-23 @ 18:06) (102/61 - 110/63)  RR: 18 (12-18-23 @ 18:06) (18 - 18)  SpO2: 94% (12-18-23 @ 18:06) (93% - 96%)  Wt(kg): --  I&O's Summary    17 Dec 2023 07:01  -  18 Dec 2023 07:00  --------------------------------------------------------  IN: 600 mL / OUT: 850 mL / NET: -250 mL    18 Dec 2023 07:01  -  18 Dec 2023 18:44  --------------------------------------------------------  IN: 400 mL / OUT: 400 mL / NET: 0 mL          Appearance: NAD  HEENT:   Dry  oral mucosa, PERRL, EOMI	  Lymphatic: No lymphadenopathy  Cardiovascular: Normal S1 S2, No JVD, + systolic murmur  Respiratory: Lungs clear to auscultation	  Psychiatry: A & O x 3, Mood & affect appropriate  Gastrointestinal:  Soft, Non-tender, + BS	  Skin: No rashes, No ecchymoses, No cyanosis	  Neurologic: Non-focal  Extremities: Normal range of motion, No clubbing, cyanosis + edema  Vascular: Peripheral pulses palpable 2+ bilaterally      LABS:    CARDIAC MARKERS:                                8.9    9.47  )-----------( 147      ( 17 Dec 2023 06:39 )             29.2     12-17    136  |  97  |  46<H>  ----------------------------<  103<H>  4.4   |  27  |  1.50<H>    Ca    9.1      17 Dec 2023 06:39          TELEMETRY: 	    ECG:  	  RADIOLOGY:   DIAGNOSTIC TESTING:  [ ] Echocardiogram:  [ ]  Catheterization:  [ ] Stress Test:    OTHER:

## 2023-12-18 NOTE — DISCHARGE NOTE NURSING/CASE MANAGEMENT/SOCIAL WORK - NSDCPEFALRISK_GEN_ALL_CORE
For information on Fall & Injury Prevention, visit: [email or web address] OR  [email or web address] OR  https://www.cdc.gov/steadi/patient.html For information on Fall & Injury Prevention, visit: [email or web address] OR  [email or web address] OR  https://www.cdc.gov/steadi/patient.html

## 2023-12-18 NOTE — DIETITIAN INITIAL EVALUATION ADULT - OTHER INFO
Maritza Harmon, DO  03/10/20     NKFA per patient, confirmed by chart. Patient reports thinking his UBW is around 160-165lbs and denies any recent fluctuations PTA. Recent weight history per [facility] as follows: 155lbs (12/8/2023), 165lbs (10/18/2023), 157lbs (9/29/2023), 154lbs (8/2/2023). Overall weight trend noted within a ~11lbs gap with intermittent fluctuations. Will monitor weight trend.    Synthroid noted.

## 2023-12-18 NOTE — DISCHARGE NOTE PROVIDER - NSDCFUADDAPPT_GEN_ALL_CORE_FT
APPTS ARE READY TO BE MADE: [x] YES    Best Family or Patient Contact (if needed):    Additional Information about above appointments (if needed):    1:   2:   3:     Other comments or requests:    Follow up with your PMD and Orthopedics as an outpatient.     APPTS ARE READY TO BE MADE: [x] YES    Best Family or Patient Contact (if needed):    Additional Information about above appointments (if needed):    1:   2:   3:     Other comments or requests:    Follow up with your PMD and Orthopedics as an outpatient.     APPTS ARE READY TO BE MADE: [x] YES    Best Family or Patient Contact (if needed):    Additional Information about above appointments (if needed):    1:   2:   3:     Other comments or requests:   Patient was provided with follow up request details and was advised to call to schedule follow up within specified time frame. No scheduling assistance is needed at this time.

## 2023-12-18 NOTE — DIETITIAN INITIAL EVALUATION ADULT - PERTINENT LABORATORY DATA
12-17    136  |  97  |  46<H>  ----------------------------<  103<H>  4.4   |  27  |  1.50<H>    Ca    9.1      17 Dec 2023 06:39

## 2023-12-18 NOTE — PROGRESS NOTE ADULT - SUBJECTIVE AND OBJECTIVE BOX
DATE OF SERVICE: 12-18-23 @ 11:32    Patient is a 89y old  Male who presents with a chief complaint of fever (14 Dec 2023 08:58)      SUBJECTIVE / OVERNIGHT EVENTS: No chest pain. No shortness of breath. No complaints. No events overnight.     MEDICATIONS  (STANDING):  albuterol/ipratropium for Nebulization 3 milliLiter(s) Nebulizer every 6 hours  apixaban 2.5 milliGRAM(s) Oral two times a day  atorvastatin 40 milliGRAM(s) Oral at bedtime  finasteride 5 milliGRAM(s) Oral daily  levothyroxine 75 MICROGram(s) Oral daily  metoprolol succinate  milliGRAM(s) Oral two times a day  minocycline 100 milliGRAM(s) Oral every 12 hours  potassium chloride    Tablet ER 10 milliEquivalent(s) Oral daily  senna 2 Tablet(s) Oral at bedtime  tamsulosin 0.4 milliGRAM(s) Oral at bedtime  tiotropium 2.5 MICROgram(s) Inhaler 2 Puff(s) Inhalation daily    MEDICATIONS  (PRN):  sodium chloride 0.65% Nasal 1 Spray(s) Both Nostrils four times a day PRN Nasal Congestion  traMADol 25 milliGRAM(s) Oral every 6 hours PRN Moderate Pain (4 - 6)  traMADol 50 milliGRAM(s) Oral every 6 hours PRN Severe Pain (7 - 10)      Vital Signs Last 24 Hrs  T(C): 36.3 (18 Dec 2023 11:09), Max: 37.1 (17 Dec 2023 20:29)  T(F): 97.4 (18 Dec 2023 11:09), Max: 98.7 (17 Dec 2023 20:29)  HR: 65 (18 Dec 2023 11:09) (65 - 79)  BP: 102/61 (18 Dec 2023 11:09) (102/61 - 106/62)  BP(mean): --  RR: 18 (18 Dec 2023 11:09) (18 - 18)  SpO2: 96% (18 Dec 2023 11:09) (93% - 96%)    Parameters below as of 18 Dec 2023 11:09  Patient On (Oxygen Delivery Method): room air      CAPILLARY BLOOD GLUCOSE        I&O's Summary    17 Dec 2023 07:01  -  18 Dec 2023 07:00  --------------------------------------------------------  IN: 600 mL / OUT: 850 mL / NET: -250 mL    18 Dec 2023 07:01  -  18 Dec 2023 11:32  --------------------------------------------------------  IN: 400 mL / OUT: 400 mL / NET: 0 mL        PHYSICAL EXAM:  GENERAL: NAD, well-developed  HEAD:  Atraumatic, Normocephalic  EYES: EOMI, PERRLA, conjunctiva and sclera clear  NECK: Supple, No JVD  CHEST/LUNG: Clear to auscultation bilaterally; No wheeze  HEART: Regular rate and rhythm; No murmurs, rubs, or gallops  ABDOMEN: Soft, Nontender, Nondistended; Bowel sounds present  EXTREMITIES:  2+ Peripheral Pulses, No clubbing, cyanosis, or edema  PSYCH: AAOx3  NEUROLOGY: non-focal  SKIN: No rashes or lesions    LABS:                        8.9    9.47  )-----------( 147      ( 17 Dec 2023 06:39 )             29.2     12-17    136  |  97  |  46<H>  ----------------------------<  103<H>  4.4   |  27  |  1.50<H>    Ca    9.1      17 Dec 2023 06:39            Urinalysis Basic - ( 17 Dec 2023 06:39 )    Color: x / Appearance: x / SG: x / pH: x  Gluc: 103 mg/dL / Ketone: x  / Bili: x / Urobili: x   Blood: x / Protein: x / Nitrite: x   Leuk Esterase: x / RBC: x / WBC x   Sq Epi: x / Non Sq Epi: x / Bacteria: x        RADIOLOGY & ADDITIONAL TESTS:    Imaging Personally Reviewed:    Consultant(s) Notes Reviewed:      Care Discussed with Consultants/Other Providers:

## 2023-12-18 NOTE — DIETITIAN INITIAL EVALUATION ADULT - ENERGY INTAKE
Patient reports eating well currently during admission, no changes while having COVID-19 reported (no changes in taste/smell perception or appetite/PO). Adequate (%)

## 2023-12-18 NOTE — PROGRESS NOTE ADULT - PROBLEM SELECTOR PLAN 3
On eliquis 2.5 bid
-Abx as per ID  -D/c planning with hospice per primary team.
On eliquis 2.5 bid
-Abx as per ID  -D/c planning with hospice per primary team.
On eliquis 2.5 bid

## 2023-12-18 NOTE — PROGRESS NOTE ADULT - ASSESSMENT
89 y/o M with PMH of HTN, HLD, ?COPD (was discharged from rehab with o2 2LNC qHS and PRN), CAD (no stents), s/p bioprosthetic AVR, CVA in 10/2022, s/p R total hip replacement ~30 years ago, afib/aflutter, hypothyroid, CKD, bladder cancer, and cholangiocarcinoma (s/p treatment, stable) presenting from home with worsening R hip pain. Patient was found to have UTI in 9/2023 and was treated for 1 month. In 10/2023, patient was found to be bacteremic with septic joint on R hip, was treated with 6 week course of IV antibiotics without surgical debridement or operation given age and other comorbidities. Concern for hip infection, no plan for surgery given comorbidities. Called to consult for mild cough and o2 requirements.  89 y/o M with PMH of HTN, HLD, ?COPD (was discharged from rehab with o2 2LNC qHS and PRN), CAD (no stents), s/p bioprosthetic AVR, CVA in 10/2022, s/p R total hip replacement ~30 years ago, afib/aflutter, hypothyroid, CKD, bladder cancer, and cholangiocarcinoma (s/p treatment, stable) presenting from home with worsening R hip pain. Patient was found to have UTI in 9/2023 and was treated for 1 month. In 10/2023, patient was found to be bacteremic with septic joint on R hip, was treated with 6 week course of IV antibiotics without surgical debridement or operation given age and other comorbidities. Concern for hip infection, no plan for surgery given comorbidities. Called to consult for mild cough and o2 requirements.

## 2023-12-18 NOTE — DISCHARGE NOTE NURSING/CASE MANAGEMENT/SOCIAL WORK - NSDCFUADDAPPT_GEN_ALL_CORE_FT
Follow up with your PMD and Orthopedics as an outpatient.     APPTS ARE READY TO BE MADE: [x] YES    Best Family or Patient Contact (if needed):    Additional Information about above appointments (if needed):    1:   2:   3:     Other comments or requests:   Patient was provided with follow up request details and was advised to call to schedule follow up within specified time frame. No scheduling assistance is needed at this time.

## 2023-12-18 NOTE — DISCHARGE NOTE PROVIDER - NPI NUMBER (FOR SYSADMIN USE ONLY) :
[[id number]],[UNKNOWN] [[id number]],[UNKNOWN] [[id number]],[UNKNOWN],[[id number]] [[id number]],[UNKNOWN],[[id number]]

## 2023-12-18 NOTE — PROGRESS NOTE ADULT - TIME BILLING
Advanced care planning was discussed with patient and family.  Advanced care planning forms were reviewed and discussed as appropriate.  Differential diagnosis and plan of care discussed with patient after the evaluation.   Pain assessed and judicious use of narcotics when appropriate was discussed.  Importance of Fall prevention discussed.  Counseling on Smoking and Alcohol cessation was offered when appropriate.  Counseling on Diet, exercise, and medication compliance was done.
family meeting
Advanced care planning was discussed with patient and family.  Advanced care planning forms were reviewed and discussed as appropriate.  Differential diagnosis and plan of care discussed with patient after the evaluation.   Pain assessed and judicious use of narcotics when appropriate was discussed.  Importance of Fall prevention discussed.  Counseling on Smoking and Alcohol cessation was offered when appropriate.  Counseling on Diet, exercise, and medication compliance was done.
Advanced care planning was discussed with patient and family.  Advanced care planning forms were reviewed and discussed as appropriate.  Differential diagnosis and plan of care discussed with patient after the evaluation.   Pain assessed and judicious use of narcotics when appropriate was discussed.  Importance of Fall prevention discussed.  Counseling on Smoking and Alcohol cessation was offered when appropriate.  Counseling on Diet, exercise, and medication compliance was done.
discussion with family in person and on phone and via email, communication w ID and primary team
Advanced care planning was discussed with patient and family.  Advanced care planning forms were reviewed and discussed as appropriate.  Differential diagnosis and plan of care discussed with patient after the evaluation.   Pain assessed and judicious use of narcotics when appropriate was discussed.  Importance of Fall prevention discussed.  Counseling on Smoking and Alcohol cessation was offered when appropriate.  Counseling on Diet, exercise, and medication compliance was done.
review of imaging, review w ID, pcp, ortho
Advanced care planning was discussed with patient and family.  Advanced care planning forms were reviewed and discussed as appropriate.  Differential diagnosis and plan of care discussed with patient after the evaluation.   Pain assessed and judicious use of narcotics when appropriate was discussed.  Importance of Fall prevention discussed.  Counseling on Smoking and Alcohol cessation was offered when appropriate.  Counseling on Diet, exercise, and medication compliance was done.

## 2023-12-18 NOTE — PROGRESS NOTE ADULT - PROBLEM SELECTOR PROBLEM 1
Infected prosthetic hip
Hypoxia
Hypoxia
Infected prosthetic hip

## 2023-12-18 NOTE — PROGRESS NOTE ADULT - ASSESSMENT
89M with history of HTN, HLD, COPD (on intermittent 2L NC), CAD (no stents), s/p bioprosthetic AVR, CVA in 10/2022, s/p R total hip replacement ~30 years ago, afib/aflutter, hypothyroid, CKD, bladder cancer, and cholangiocarcinoma (s/p treatment, stable) presenting from home with worsening R hip pain. Accompanied by granddaughter at bedside. Patient was found to have UTI in 9/2023 and was treated for 1 month. In 10/2023, patient was found to be bacteremic with septic joint on R hip, was treated with 6 week course of IV antibiotics without surgical debridement or operation given age and other comorbidities. Patient was discharged from [facility] to [facility], was able to work with PT appropriately. He finished last dose of IV antibiotics and was discharged home on Tuesday. He was seen by Dr. Redding in clinic yesterday and was being planned for IV antibiotics but has not started yet. He started having significant pain on the R hip again since discharge, now unable to bear weight. No fever or chills. No falls or trauma    JANKI  - hold lasix  - discharge home with lasixx 20 qd    covid 19 positive  - supportive care  - keep O2 saturation above 92%  - pt is asymptomatic    hypoxia  - seen by pulm   -Recently discharged from rehab with o2 PRN  -Mild congestion on CXR. Keep O>I as tolerated.   -O2 lowered to 1LNC  -Check o2 sats on RA and document. Keep sats >88% with o2 PRN. Pt will likely require o2 qHS and PRN.   -?Underlying COPD  -Continue Duoneb q6h while inpatient, change to PRN on discharge. Please assure pt has rx for nebs and nebulizer machine on discharge.    right prosthetic  hip infection  - Blood cultures times 2 NGTD  -  to go home on po suppression with minocycline  - ID consult appreciated  - CT of right hip done  - no plans for surgery or drainage    cough  - c/w duoneb  - pulm called    aravind lower ext edema with dyspnea  - CXR  - doppler  negative  - stop iv lasix 40 qd  - change to po on discharge      severe prosthetic aortic valve stenosis  - cardio consult following  - conservative management    anemia  - iron studies c/w iron def  - FOBT  - iv iron  -  s/p transfusion 1 unit prbc    afib , AVR  - c/w metoprolol  - c/w eliquis 2.5 bid    CAD  - c/w plavix    COPD  - c/w inhalers    BPH  - flomax and finasteride    HLD  - c/w lipitor    dvt px  - on eliquis    d/c planning  - home  - d/w son and case management              89M with history of HTN, HLD, COPD (on intermittent 2L NC), CAD (no stents), s/p bioprosthetic AVR, CVA in 10/2022, s/p R total hip replacement ~30 years ago, afib/aflutter, hypothyroid, CKD, bladder cancer, and cholangiocarcinoma (s/p treatment, stable) presenting from home with worsening R hip pain. Accompanied by granddaughter at bedside. Patient was found to have UTI in 9/2023 and was treated for 1 month. In 10/2023, patient was found to be bacteremic with septic joint on R hip, was treated with 6 week course of IV antibiotics without surgical debridement or operation given age and other comorbidities. Patient was discharged from [facility] to [facility], was able to work with PT appropriately. He finished last dose of IV antibiotics and was discharged home on Tuesday. He was seen by Dr. Redding in clinic yesterday and was being planned for IV antibiotics but has not started yet. He started having significant pain on the R hip again since discharge, now unable to bear weight. No fever or chills. No falls or trauma    JANKI  - hold lasix  - discharge home with lasixx 20 qd    covid 19 positive  - supportive care  - keep O2 saturation above 92%  - pt is asymptomatic    hypoxia  - seen by pulm   -Recently discharged from rehab with o2 PRN  -Mild congestion on CXR. Keep O>I as tolerated.   -O2 lowered to 1LNC  -Check o2 sats on RA and document. Keep sats >88% with o2 PRN. Pt will likely require o2 qHS and PRN.   -?Underlying COPD  -Continue Duoneb q6h while inpatient, change to PRN on discharge. Please assure pt has rx for nebs and nebulizer machine on discharge.    right prosthetic  hip infection  - Blood cultures times 2 NGTD  -  to go home on po suppression with minocycline  - ID consult appreciated  - CT of right hip done  - no plans for surgery or drainage    cough  - c/w duoneb  - pulm called    aravind lower ext edema with dyspnea  - CXR  - doppler  negative  - stop iv lasix 40 qd  - change to po on discharge      severe prosthetic aortic valve stenosis  - cardio consult following  - conservative management    anemia  - iron studies c/w iron def  - FOBT  - iv iron  -  s/p transfusion 1 unit prbc    afib , AVR  - c/w metoprolol  - c/w eliquis 2.5 bid    CAD  - c/w plavix    COPD  - c/w inhalers    BPH  - flomax and finasteride    HLD  - c/w lipitor    dvt px  - on eliquis    d/c planning  - home  - d/w son and case management

## 2023-12-18 NOTE — PROGRESS NOTE ADULT - PROVIDER SPECIALTY LIST ADULT
Internal Medicine
Orthopedics
Internal Medicine
Internal Medicine
Cardiology
Infectious Disease
Infectious Disease
Internal Medicine
Internal Medicine
Orthopedics
Orthopedics
Infectious Disease
Internal Medicine
Cardiology
Infectious Disease
Internal Medicine
Orthopedics
Cardiology
Pulmonology
Cardiology
Pulmonology
Cardiology

## 2023-12-18 NOTE — PROGRESS NOTE ADULT - PROBLEM SELECTOR PROBLEM 2
2019 novel coronavirus disease (COVID-19)
2019 novel coronavirus disease (COVID-19)
Prosthetic aortic valve stenosis

## 2023-12-18 NOTE — PROGRESS NOTE ADULT - PROBLEM SELECTOR PLAN 1
-Recently discharged from rehab with o2 qHS and PRN  -Mild congestion on CXR. Keep O>I as tolerated.   -Placed on room air this AM. D/w RN to monitor sats and document both at rest and with exertion. Keep sats >88% with O2 PRN   -May need O2 qHS and PRN  -? Underlying COPD  -Continue Duoneb q6h while inpatient, change to PRN on discharge. Please assure pt has rx for nebs and nebulizer machine on discharge.
IV abx   Ortho consulted.
completed IV abx   Ortho consulted.
-Recently discharged from rehab with o2 qHS and PRN  -Mild congestion on CXR. Keep O>I as tolerated.   -Normoxic at rest. Check o2 sats on RA with exertion. Keep sats >88% with O2 PRN.  -May need O2 qHS and PRN  -? Underlying COPD  -Continue Duoneb q6h while inpatient, change to PRN on discharge. Please assure pt has rx for nebs and nebulizer machine on discharge.  -D/c planning pe primary team
completed IV abx   Ortho consulted.

## 2023-12-18 NOTE — DIETITIAN INITIAL EVALUATION ADULT - PERTINENT MEDS FT
MEDICATIONS  (STANDING):  albuterol/ipratropium for Nebulization 3 milliLiter(s) Nebulizer every 6 hours  apixaban 2.5 milliGRAM(s) Oral two times a day  atorvastatin 40 milliGRAM(s) Oral at bedtime  finasteride 5 milliGRAM(s) Oral daily  levothyroxine 75 MICROGram(s) Oral daily  metoprolol succinate  milliGRAM(s) Oral two times a day  minocycline 100 milliGRAM(s) Oral every 12 hours  potassium chloride    Tablet ER 10 milliEquivalent(s) Oral daily  senna 2 Tablet(s) Oral at bedtime  tamsulosin 0.4 milliGRAM(s) Oral at bedtime  tiotropium 2.5 MICROgram(s) Inhaler 2 Puff(s) Inhalation daily    MEDICATIONS  (PRN):  sodium chloride 0.65% Nasal 1 Spray(s) Both Nostrils four times a day PRN Nasal Congestion  traMADol 25 milliGRAM(s) Oral every 6 hours PRN Moderate Pain (4 - 6)  traMADol 50 milliGRAM(s) Oral every 6 hours PRN Severe Pain (7 - 10)

## 2023-12-18 NOTE — PROGRESS NOTE ADULT - SUBJECTIVE AND OBJECTIVE BOX
Follow-up Pulm Progress Note    No new respiratory events overnight.  Denies SOB/CP/cough.  Sats low 90s on RA     Medications:  MEDICATIONS  (STANDING):  albuterol/ipratropium for Nebulization 3 milliLiter(s) Nebulizer every 6 hours  apixaban 2.5 milliGRAM(s) Oral two times a day  atorvastatin 40 milliGRAM(s) Oral at bedtime  finasteride 5 milliGRAM(s) Oral daily  levothyroxine 75 MICROGram(s) Oral daily  metoprolol succinate  milliGRAM(s) Oral two times a day  minocycline 100 milliGRAM(s) Oral every 12 hours  potassium chloride    Tablet ER 10 milliEquivalent(s) Oral daily  senna 2 Tablet(s) Oral at bedtime  tamsulosin 0.4 milliGRAM(s) Oral at bedtime  tiotropium 2.5 MICROgram(s) Inhaler 2 Puff(s) Inhalation daily    MEDICATIONS  (PRN):  sodium chloride 0.65% Nasal 1 Spray(s) Both Nostrils four times a day PRN Nasal Congestion  traMADol 50 milliGRAM(s) Oral every 6 hours PRN Severe Pain (7 - 10)  traMADol 25 milliGRAM(s) Oral every 6 hours PRN Moderate Pain (4 - 6)          Vital Signs Last 24 Hrs  T(C): 36.9 (18 Dec 2023 04:41), Max: 37.1 (17 Dec 2023 20:29)  T(F): 98.5 (18 Dec 2023 04:41), Max: 98.7 (17 Dec 2023 20:29)  HR: 77 (18 Dec 2023 04:41) (77 - 79)  BP: 106/62 (18 Dec 2023 04:41) (103/71 - 106/62)  BP(mean): --  RR: 18 (18 Dec 2023 04:41) (18 - 18)  SpO2: 93% (18 Dec 2023 04:41) (93% - 93%)    Parameters below as of 18 Dec 2023 04:41  Patient On (Oxygen Delivery Method): room air              12-17 @ 07:01  -  12-18 @ 07:00  --------------------------------------------------------  IN: 600 mL / OUT: 850 mL / NET: -250 mL          LABS:                        8.9    9.47  )-----------( 147      ( 17 Dec 2023 06:39 )             29.2     12-17    136  |  97  |  46<H>  ----------------------------<  103<H>  4.4   |  27  |  1.50<H>    Ca    9.1      17 Dec 2023 06:39            CAPILLARY BLOOD GLUCOSE          Urinalysis Basic - ( 17 Dec 2023 06:39 )    Color: x / Appearance: x / SG: x / pH: x  Gluc: 103 mg/dL / Ketone: x  / Bili: x / Urobili: x   Blood: x / Protein: x / Nitrite: x   Leuk Esterase: x / RBC: x / WBC x   Sq Epi: x / Non Sq Epi: x / Bacteria: x            Physical Examination:  PULM: Decreased BS; no wheeze   CVS: S1, S2 heard    RADIOLOGY REVIEWED  CXR: mild congestion

## 2023-12-18 NOTE — DISCHARGE NOTE PROVIDER - CARE PROVIDER_API CALL
Gene Vickers  Orthopaedic Surgery  [address], [address]  [place] [address]  Phone: [phone]  Fax: [phone]  Follow Up Time: 2 weeks    Dr FLIP Guzmán  Phone: (   )    -  Fax: (   )    -  Follow Up Time:    Gene Vickers  Orthopaedic Surgery  [address], [address]  [place] [address]  Phone: [phone]  Fax: [phone]  Follow Up Time: 2 weeks    Dr FLIP Guzmán  Phone: (   )    -  Fax: (   )    -  Follow Up Time:    Gene Vickers  Orthopaedic Surgery  [address], [address]  [place] [address]  Phone: [phone]  Fax: [phone]  Follow Up Time: 2 weeks    Dr FLIP Guzmán  Phone: (   )    -  Fax: (   )    -  Follow Up Time:     Moose Meehan  Cardiology  [address], [address]  [place] [address]  Phone: [phone]  Fax: [phone]  Follow Up Time:    Gene Vickers  Orthopaedic Surgery  [address], [address]  [place] [address]  Phone: [phone]  Fax: [phone]  Follow Up Time: 2 weeks    Dr FLIP Guzmán  Phone: (   )    -  Fax: (   )    -  Follow Up Time:     Moose Meehan  Cardiology  [address], [address]  [place] [address]  Phone: [phone]  Fax: [phone]  Follow Up Time:

## 2023-12-18 NOTE — DISCHARGE NOTE PROVIDER - CARE PROVIDERS DIRECT ADDRESSES
[email or web address],[email or web address] [email or web address] [email or web address],DirectAddress_Unknown ,[email or web address],[email or web address],DirectAddress_Unknown

## 2023-12-18 NOTE — DISCHARGE NOTE PROVIDER - NSDCMRMEDTOKEN_GEN_ALL_CORE_FT
atorvastatin 40 mg oral tablet: 1 tab(s) orally once a day (at bedtime)  cholecalciferol 125 mcg (5000 intl units) oral tablet: 1 tab(s) orally once a day  clopidogrel 75 mg oral tablet: 1 tab(s) orally once a day  Eliquis 5 mg oral tablet: 1 tab(s) orally 2 times a day  finasteride 5 mg oral tablet: 1 tab(s) orally once a day  folic acid 0.4 mg oral tablet: 1 tab(s) orally once a day  furosemide 20 mg oral tablet: 1 tab(s) orally once a day  levothyroxine 75 mcg (0.075 mg) oral tablet: 1 tab(s) orally once a day  metoprolol succinate 100 mg oral tablet, extended release: 1 tab(s) orally 2 times a day - hold if diastolic pressure less than 54  minocycline 100 mg oral tablet: 1 tab(s) orally 2 times a day  multivitamin + minerals: 1 tab(s) orally once a day  Ocean Complete nasal spray: nasal 4 times a day  potassium chloride 10 mEq oral tablet, extended release: 1 tab(s) orally once a day  senna (sennosides) 8.6 mg oral tablet: 1 to 2 tab(s) orally once a day  Spiriva 18 mcg inhalation capsule: 1 cap(s) inhaled once a day  tamsulosin 0.4 mg oral capsule: 1 cap(s) orally once a day  visi conor eye vitamin: 1 tab(s) orally once a day   atorvastatin 40 mg oral tablet: 1 tab(s) orally once a day (at bedtime)  finasteride 5 mg oral tablet: 1 tab(s) orally once a day  folic acid 0.4 mg oral tablet: 1 tab(s) orally once a day  furosemide 20 mg oral tablet: 1 tab(s) orally once a day  levothyroxine 75 mcg (0.075 mg) oral tablet: 1 tab(s) orally once a day  metoprolol succinate 100 mg oral tablet, extended release: 1 tab(s) orally 2 times a day - hold if systolic blood pressure less than 100  minocycline 100 mg oral tablet: 1 tab(s) orally 2 times a day  multivitamin + minerals: 1 tab(s) orally once a day  Ocean Complete nasal spray: nasal 4 times a day  potassium chloride 10 mEq oral tablet, extended release: 1 tab(s) orally once a day  senna (sennosides) 8.6 mg oral tablet: 1 to 2 tab(s) orally once a day  Spiriva 18 mcg inhalation capsule: 1 cap(s) inhaled once a day  tamsulosin 0.4 mg oral capsule: 1 cap(s) orally once a day  visi conor eye vitamin: 1 tab(s) orally once a day   apixaban 2.5 mg oral tablet: 1 tab(s) orally 2 times a day  atorvastatin 40 mg oral tablet: 1 tab(s) orally once a day (at bedtime)  finasteride 5 mg oral tablet: 1 tab(s) orally once a day  folic acid 0.4 mg oral tablet: 1 tab(s) orally once a day  furosemide 20 mg oral tablet: 1 tab(s) orally once a day  levothyroxine 75 mcg (0.075 mg) oral tablet: 1 tab(s) orally once a day  metoprolol succinate 100 mg oral tablet, extended release: 1 tab(s) orally 2 times a day - hold if systolic blood pressure less than 100  minocycline 100 mg oral tablet: 1 tab(s) orally 2 times a day  multivitamin + minerals: 1 tab(s) orally once a day  Narcan 4 mg/0.1 mL nasal spray: 4 milligram(s) intranasally once  Ocean Complete nasal spray: nasal 4 times a day  potassium chloride 10 mEq oral tablet, extended release: 1 tab(s) orally once a day  senna (sennosides) 8.6 mg oral tablet: 1 to 2 tab(s) orally once a day  Spiriva 18 mcg inhalation capsule: 1 cap(s) inhaled once a day  tamsulosin 0.4 mg oral capsule: 1 cap(s) orally once a day  traMADol 50 mg oral tablet: 0.5 tab(s) orally every 6 hours as needed for Moderate Pain (4 - 6) MDD: 2 tabs  visi conor eye vitamin: 1 tab(s) orally once a day   apixaban 2.5 mg oral tablet: 1 tab(s) orally 2 times a day  atorvastatin 40 mg oral tablet: 1 tab(s) orally once a day (at bedtime)  finasteride 5 mg oral tablet: 1 tab(s) orally once a day  folic acid 0.4 mg oral tablet: 1 tab(s) orally once a day  furosemide 20 mg oral tablet: 1 tab(s) orally once a day  levothyroxine 75 mcg (0.075 mg) oral tablet: 1 tab(s) orally once a day  metoprolol succinate 100 mg oral tablet, extended release: 1 tab(s) orally 2 times a day - hold if systolic blood pressure less than 100  minocycline 100 mg oral tablet: 1 tab(s) orally 2 times a day  multivitamin + minerals: 1 tab(s) orally once a day  Narcan 4 mg/0.1 mL nasal spray: 4 milligram(s) intranasally once  Ocean Complete nasal spray: nasal 4 times a day  potassium chloride 10 mEq oral tablet, extended release: 1 tab(s) orally once a day  senna (sennosides) 8.6 mg oral tablet: 1 to 2 tab(s) orally once a day  tamsulosin 0.4 mg oral capsule: 1 cap(s) orally once a day  tiotropium 2.5 mcg/inh inhalation aerosol: 2 puff(s) inhaled once a day  traMADol 50 mg oral tablet: 0.5 tab(s) orally every 6 hours as needed for Moderate Pain (4 - 6) MDD: 2 tabs  visi conor eye vitamin: 1 tab(s) orally once a day   Albuterol (Eqv-Proventil HFA) 90 mcg/inh inhalation aerosol: 2 puff(s) inhaled 2 times a day  apixaban 2.5 mg oral tablet: 1 tab(s) orally 2 times a day  atorvastatin 40 mg oral tablet: 1 tab(s) orally once a day (at bedtime)  finasteride 5 mg oral tablet: 1 tab(s) orally once a day  folic acid 0.4 mg oral tablet: 1 tab(s) orally once a day  furosemide 20 mg oral tablet: 1 tab(s) orally once a day  levothyroxine 75 mcg (0.075 mg) oral tablet: 1 tab(s) orally once a day  metoprolol succinate 100 mg oral tablet, extended release: 1 tab(s) orally 2 times a day - hold if systolic blood pressure less than 100  minocycline 100 mg oral tablet: 1 tab(s) orally 2 times a day  multivitamin + minerals: 1 tab(s) orally once a day  Narcan 4 mg/0.1 mL nasal spray: 4 milligram(s) intranasally once  Ocean Complete nasal spray: nasal 4 times a day  potassium chloride 10 mEq oral tablet, extended release: 1 tab(s) orally once a day  senna (sennosides) 8.6 mg oral tablet: 1 to 2 tab(s) orally once a day  tamsulosin 0.4 mg oral capsule: 1 cap(s) orally once a day  tiotropium 2.5 mcg/inh inhalation aerosol: 2 puff(s) inhaled once a day  traMADol 50 mg oral tablet: 0.5 tab(s) orally every 6 hours as needed for Moderate Pain (4 - 6) MDD: 2 tabs  visi conor eye vitamin: 1 tab(s) orally once a day

## 2023-12-18 NOTE — PROGRESS NOTE ADULT - PROBLEM SELECTOR PLAN 2
severe prosthetic aortic valve stenosis   appears compensated at present time   given advanced age and decreased mobility, would favor conservative management. Discussed with his son at bedside and he agrees

## 2023-12-18 NOTE — DIETITIAN INITIAL EVALUATION ADULT - ORAL INTAKE PTA/DIET HISTORY
Patient reports he has been eating well recently w/ no active issues. Denied chewing/swallowing impairment or nausea/vomiting.

## 2023-12-18 NOTE — DIETITIAN INITIAL EVALUATION ADULT - ADD RECOMMEND
1. continue current diet as tolerated of: regular diet  2. encourage PO intake, protein source with each meal as tolerated  3. if medically feasible, consider addition of MVI and vitamin C to help aid in wound healing  4. RD to add Mighty Shake BID to help provide extra calories and protein  5. monitor PO intake, weight trend, electrolytes, blood glucose levels, labs, BMs, wound healing

## 2023-12-19 ENCOUNTER — TRANSCRIPTION ENCOUNTER (OUTPATIENT)
Age: 88
End: 2023-12-19

## 2023-12-20 ENCOUNTER — NON-APPOINTMENT (OUTPATIENT)
Age: 88
End: 2023-12-20

## 2024-05-23 NOTE — DISCHARGE NOTE PROVIDER - EXTENDED VTE OTHER REASON FREE TEXT
"Virtual Visit Details    Type of service:  Video Visit   Video Start Time: {video visit start/end time for provider to select:274561}  Video End Time:{video visit start/end time for provider to select:328716}    Originating Location (pt. Location): {video visit patient location:926269::\"Home\"}  {PROVIDER LOCATION On-site should be selected for visits conducted from your clinic location or adjoining [facility] hospital, academic office, or other nearby [facility] building. Off-site should be selected for all other provider locations, including home:006356}  Distant Location (provider location):  {virtual location provider:365624}  Platform used for Video Visit: {Virtual Visit Platforms:187953::\"Staples\"}  " Pt currently on Apixaban for afib

## 2025-01-09 NOTE — H&P ADULT - GENERAL
SATURNINO APPROVED    Medication: CAPECITABINE PO  Amount: $ 10,000  Foundation Name: [facility] Phone:    Foundation Effective Date: 1/9/2025  Foundation Expiration Date: 1/9/2026  Additional Information: N/A  Patient Notified: Yes        details…

## 2025-04-07 NOTE — ED ADULT TRIAGE NOTE - ADDITIONAL SAFETY/BANDS...
H&P reviewed. The patient was examined and there are no changes to the H&P.  
Additional Safety/Bands: